# Patient Record
Sex: FEMALE | Race: WHITE | Employment: PART TIME | URBAN - METROPOLITAN AREA
[De-identification: names, ages, dates, MRNs, and addresses within clinical notes are randomized per-mention and may not be internally consistent; named-entity substitution may affect disease eponyms.]

---

## 2017-01-30 ENCOUNTER — HOSPITAL ENCOUNTER (OUTPATIENT)
Dept: MAMMOGRAPHY | Age: 42
Discharge: HOME | End: 2017-01-30

## 2017-01-30 DIAGNOSIS — Z12.31 ENCOUNTER FOR SCREENING MAMMOGRAM FOR BREAST CANCER: ICD-10-CM

## 2019-12-26 LAB — HIV AG/AB: NORMAL

## 2019-12-29 LAB — ANTIBODY: NORMAL

## 2021-03-01 LAB
AVERAGE GLUCOSE: NORMAL
CHOLESTEROL, TOTAL: 225 MG/DL
CHOLESTEROL/HDL RATIO: 3.1
HBA1C MFR BLD: 5.4 %
HDLC SERPL-MCNC: 47 MG/DL (ref 35–70)
LDL CHOLESTEROL CALCULATED: 144 MG/DL (ref 0–160)
NONHDLC SERPL-MCNC: NORMAL MG/DL
TRIGL SERPL-MCNC: 190 MG/DL
VLDLC SERPL CALC-MCNC: 34 MG/DL

## 2021-04-05 LAB — TSH SERPL DL<=0.05 MIU/L-ACNC: 3.35 UIU/ML

## 2021-05-12 ENCOUNTER — OFFICE VISIT (OUTPATIENT)
Dept: FAMILY MEDICINE CLINIC | Age: 46
End: 2021-05-12
Payer: MEDICAID

## 2021-05-12 VITALS
OXYGEN SATURATION: 98 % | WEIGHT: 154 LBS | HEART RATE: 89 BPM | TEMPERATURE: 97.5 F | SYSTOLIC BLOOD PRESSURE: 102 MMHG | BODY MASS INDEX: 25.66 KG/M2 | HEIGHT: 65 IN | DIASTOLIC BLOOD PRESSURE: 64 MMHG

## 2021-05-12 DIAGNOSIS — E78.2 MIXED HYPERLIPIDEMIA: ICD-10-CM

## 2021-05-12 DIAGNOSIS — M25.512 CHRONIC LEFT SHOULDER PAIN: ICD-10-CM

## 2021-05-12 DIAGNOSIS — Z76.89 ENCOUNTER TO ESTABLISH CARE: ICD-10-CM

## 2021-05-12 DIAGNOSIS — E03.9 HYPOTHYROIDISM, UNSPECIFIED TYPE: Primary | ICD-10-CM

## 2021-05-12 DIAGNOSIS — G89.29 CHRONIC LEFT SHOULDER PAIN: ICD-10-CM

## 2021-05-12 DIAGNOSIS — E55.9 VITAMIN D DEFICIENCY: ICD-10-CM

## 2021-05-12 DIAGNOSIS — R00.0 TACHYCARDIA: ICD-10-CM

## 2021-05-12 PROCEDURE — G8419 CALC BMI OUT NRM PARAM NOF/U: HCPCS | Performed by: NURSE PRACTITIONER

## 2021-05-12 PROCEDURE — 99204 OFFICE O/P NEW MOD 45 MIN: CPT | Performed by: NURSE PRACTITIONER

## 2021-05-12 PROCEDURE — G8427 DOCREV CUR MEDS BY ELIG CLIN: HCPCS | Performed by: NURSE PRACTITIONER

## 2021-05-12 PROCEDURE — 1036F TOBACCO NON-USER: CPT | Performed by: NURSE PRACTITIONER

## 2021-05-12 RX ORDER — DICLOFENAC SODIUM 75 MG/1
75 TABLET, DELAYED RELEASE ORAL 2 TIMES DAILY
COMMUNITY
Start: 2021-03-01 | End: 2021-05-12 | Stop reason: ALTCHOICE

## 2021-05-12 RX ORDER — LEVOTHYROXINE SODIUM 0.07 MG/1
TABLET ORAL
COMMUNITY
Start: 2016-12-13 | End: 2021-05-12

## 2021-05-12 RX ORDER — FLUTICASONE PROPIONATE 50 MCG
2 SPRAY, SUSPENSION (ML) NASAL
COMMUNITY
Start: 2017-01-17 | End: 2022-07-21 | Stop reason: SDUPTHER

## 2021-05-12 RX ORDER — LORATADINE 10 MG/1
10 TABLET ORAL
COMMUNITY
Start: 2016-01-28 | End: 2021-05-12

## 2021-05-12 RX ORDER — NAPROXEN 500 MG/1
500 TABLET ORAL
COMMUNITY
Start: 2016-01-28 | End: 2021-05-12

## 2021-05-12 RX ORDER — METOPROLOL SUCCINATE 25 MG/1
25 TABLET, EXTENDED RELEASE ORAL DAILY
Status: ON HOLD | COMMUNITY
End: 2022-05-27 | Stop reason: HOSPADM

## 2021-05-12 RX ORDER — LEVOTHYROXINE SODIUM 88 UG/1
88 TABLET ORAL DAILY
COMMUNITY
End: 2022-06-09 | Stop reason: SDUPTHER

## 2021-05-12 SDOH — HEALTH STABILITY: MENTAL HEALTH: HOW MANY STANDARD DRINKS CONTAINING ALCOHOL DO YOU HAVE ON A TYPICAL DAY?: NOT ASKED

## 2021-05-12 SDOH — HEALTH STABILITY: MENTAL HEALTH: HOW OFTEN DO YOU HAVE A DRINK CONTAINING ALCOHOL?: MONTHLY OR LESS

## 2021-05-12 ASSESSMENT — PATIENT HEALTH QUESTIONNAIRE - PHQ9
SUM OF ALL RESPONSES TO PHQ QUESTIONS 1-9: 0
2. FEELING DOWN, DEPRESSED OR HOPELESS: 0
1. LITTLE INTEREST OR PLEASURE IN DOING THINGS: 0
SUM OF ALL RESPONSES TO PHQ QUESTIONS 1-9: 0

## 2021-05-12 NOTE — PROGRESS NOTES
Patient: Christine Joyce is a 41 y.o. female who presents today with the following Chief Complaint(s):  Chief Complaint   Patient [facility]   [facility]     Establish Care         HPI-this is a 67-year-old female patient establishing care with me today  She has a history of hypothyroidism in which she just had labs back in April I noted that within care everywhere. Her lab on April 5 was 3.35 all for the TSH. She currently is taking levothyroxine 100 MCG tablet daily. She denies no side effects from the medication and is stable right now. She states prior to this in March it was elevated and they did a dose adjustment on her but now she feels fine. I reviewed all her recent labs her hemoglobin A1c back in March was 5.4. CBC on March 1 was normal.  CHEM profile essentially normal calcium was slightly high at 10.3. Her lipid panel was elevated total cholesterol was 225, triglycerides elevated 190 LDL was 144. During the visit today we encouraged her to diet and exercise and she is familiar with the [id number] [place] [address] because she is from [facility]. She does have a vitamin D deficiency in which she takes replacement therapy vitamin D 50,000 units every week. Her last lab was drawn on April 5 of 2021 and it was low at 24. She does have a history of tachycardia she currently is taking metoprolol extended release 25 mg tablet daily. She does have occasional exacerbations of this and is wanting a cardiac referral.  She was seeing a cardiology from [facility] but this is too far for her to drive so she would like a new cardiologist today. She also complains of chronic left shoulder pain she was taking Voltaren 75 EC tablets twice daily but she is not wanting to take these anymore. I discontinued this medication in the [facility] and I switched her to topical Voltaren. She requested this  She also has a history of mixed hyperlipidemia I mentioned her labs above.   I just recommended her dieting and exercising to Abdominal:      General: Bowel sounds are normal.      Palpations: Abdomen is soft. There is no mass. Tenderness: There is no abdominal tenderness. Musculoskeletal: Normal range of motion. Lymphadenopathy:      Cervical: No cervical adenopathy. Skin:     General: Skin is warm and dry. Neurological:      General: No focal deficit present. Mental Status: She is alert and oriented to person, place, and time. Psychiatric:         Mood and Affect: Mood normal.         Behavior: Behavior normal.         Thought Content: Thought content normal.         Judgment: Judgment normal.       Vitals:    05/12/21 0941   BP: 102/64   Pulse: 89   Temp: 97.5 °F (36.4 °C)   SpO2: 98%       Assessment:  Encounter Diagnoses   Name Primary?  Hypothyroidism, unspecified type Yes    Encounter to establish care     Vitamin D deficiency     Tachycardia     Chronic left shoulder pain     Mixed hyperlipidemia        Controlled SubstancesMonitoring:  NA    Plan:  1. Hypothyroidism, unspecified type  Stable-see HPI for most recent TSH  Continue levothyroxine 100 MCG daily  Follow-up in October we will repeat the lab    2. Encounter to establish care  Establish care    3. Vitamin D deficiency  Problem-see HPI for most recent lab value. She was low  Continue  - vitamin D (CHOLECALCIFEROL) 53123 UNIT CAPS; Take 1 capsule by mouth once a week  Dispense: 4 capsule; Refill: 2    4. Tachycardia  Problem  - Kiet Hargrove MD, [facility]    5. Chronic left shoulder pain  Him  - diclofenac sodium (VOLTAREN) 1 % GEL; Apply topically 2 times daily  Dispense: 150 g; Refill: 1    6. Mixed hyperlipidemia  Problem  See HPI most recent lab values   She was instructed to return to the clinic in October we will redraw these labs  Also instructed to rely on her [address] and [employer]TOIN[employer]TE [employer]    Reviewed treatment plan with patient.   Patient verbalized understanding to treatment plan and questions were answered. [facility].  [facility], [address] [place] [address]

## 2022-05-24 ENCOUNTER — APPOINTMENT (OUTPATIENT)
Dept: CT IMAGING | Age: 47
End: 2022-05-24
Payer: MEDICAID

## 2022-05-24 ENCOUNTER — HOSPITAL ENCOUNTER (OUTPATIENT)
Age: 47
Setting detail: OBSERVATION
Discharge: HOME | End: 2022-05-27
Attending: EMERGENCY MEDICINE | Admitting: HOSPITALIST
Payer: MEDICAID

## 2022-05-24 DIAGNOSIS — R42 VERTIGO: ICD-10-CM

## 2022-05-24 DIAGNOSIS — R42 DIZZINESS: Primary | ICD-10-CM

## 2022-05-24 DIAGNOSIS — E87.6 HYPOKALEMIA: ICD-10-CM

## 2022-05-24 LAB
A/G RATIO: 1.8 (ref 1.1–2.2)
ALBUMIN SERPL-MCNC: 4.7 G/DL (ref 3.4–5)
ALP BLD-CCNC: 71 U/L (ref 40–129)
ALT SERPL-CCNC: 12 U/L (ref 10–40)
ANION GAP SERPL CALCULATED.3IONS-SCNC: 14 MMOL/L (ref 3–16)
AST SERPL-CCNC: 11 U/L (ref 15–37)
BACTERIA: ABNORMAL /HPF
BASOPHILS ABSOLUTE: 0.1 K/UL (ref 0–0.2)
BASOPHILS RELATIVE PERCENT: 0.6 %
BILIRUB SERPL-MCNC: <0.2 MG/DL (ref 0–1)
BILIRUBIN URINE: NEGATIVE
BLOOD, URINE: ABNORMAL
BUN BLDV-MCNC: 15 MG/DL (ref 7–20)
CALCIUM SERPL-MCNC: 9.6 MG/DL (ref 8.3–10.6)
CHLORIDE BLD-SCNC: 104 MMOL/L (ref 99–110)
CHOLESTEROL, TOTAL: 235 MG/DL (ref 0–199)
CLARITY: CLEAR
CO2: 22 MMOL/L (ref 21–32)
COLOR: YELLOW
CREAT SERPL-MCNC: 0.7 MG/DL (ref 0.6–1.1)
EKG ATRIAL RATE: 93 BPM
EKG DIAGNOSIS: NORMAL
EKG P AXIS: 68 DEGREES
EKG P-R INTERVAL: 124 MS
EKG Q-T INTERVAL: 384 MS
EKG QRS DURATION: 98 MS
EKG QTC CALCULATION (BAZETT): 477 MS
EKG R AXIS: 73 DEGREES
EKG T AXIS: 34 DEGREES
EKG VENTRICULAR RATE: 93 BPM
EOSINOPHILS ABSOLUTE: 0.1 K/UL (ref 0–0.6)
EOSINOPHILS RELATIVE PERCENT: 0.9 %
EPITHELIAL CELLS, UA: ABNORMAL /HPF (ref 0–5)
GFR AFRICAN AMERICAN: >60
GFR NON-AFRICAN AMERICAN: >60
GLUCOSE BLD-MCNC: 153 MG/DL (ref 70–99)
GLUCOSE URINE: 100 MG/DL
HCT VFR BLD CALC: 37.4 % (ref 36–48)
HDLC SERPL-MCNC: 43 MG/DL (ref 40–60)
HEMOGLOBIN: 12.6 G/DL (ref 12–16)
KETONES, URINE: NEGATIVE MG/DL
LDL CHOLESTEROL CALCULATED: 160 MG/DL
LEUKOCYTE ESTERASE, URINE: ABNORMAL
LYMPHOCYTES ABSOLUTE: 4.7 K/UL (ref 1–5.1)
LYMPHOCYTES RELATIVE PERCENT: 35.6 %
MAGNESIUM: 2.1 MG/DL (ref 1.8–2.4)
MCH RBC QN AUTO: 28.1 PG (ref 26–34)
MCHC RBC AUTO-ENTMCNC: 33.7 G/DL (ref 31–36)
MCV RBC AUTO: 83.4 FL (ref 80–100)
MICROSCOPIC EXAMINATION: YES
MONOCYTES ABSOLUTE: 0.6 K/UL (ref 0–1.3)
MONOCYTES RELATIVE PERCENT: 4.9 %
NEUTROPHILS ABSOLUTE: 7.7 K/UL (ref 1.7–7.7)
NEUTROPHILS RELATIVE PERCENT: 58 %
NITRITE, URINE: NEGATIVE
PDW BLD-RTO: 14.3 % (ref 12.4–15.4)
PH UA: 7 (ref 5–8)
PLATELET # BLD: 354 K/UL (ref 135–450)
PMV BLD AUTO: 8.9 FL (ref 5–10.5)
POTASSIUM REFLEX MAGNESIUM: 3.1 MMOL/L (ref 3.5–5.1)
PROTEIN UA: NEGATIVE MG/DL
RBC # BLD: 4.49 M/UL (ref 4–5.2)
RBC UA: ABNORMAL /HPF (ref 0–4)
SODIUM BLD-SCNC: 140 MMOL/L (ref 136–145)
SPECIFIC GRAVITY UA: 1.01 (ref 1–1.03)
TOTAL PROTEIN: 7.3 G/DL (ref 6.4–8.2)
TRIGL SERPL-MCNC: 158 MG/DL (ref 0–150)
TROPONIN: <0.01 NG/ML
URINE REFLEX TO CULTURE: YES
URINE TYPE: ABNORMAL
UROBILINOGEN, URINE: 0.2 E.U./DL
VLDLC SERPL CALC-MCNC: 32 MG/DL
WBC # BLD: 13.3 K/UL (ref 4–11)
WBC UA: ABNORMAL /HPF (ref 0–5)

## 2022-05-24 PROCEDURE — 99285 EMERGENCY DEPT VISIT HI MDM: CPT

## 2022-05-24 PROCEDURE — 96372 THER/PROPH/DIAG INJ SC/IM: CPT

## 2022-05-24 PROCEDURE — 93010 ELECTROCARDIOGRAM REPORT: CPT | Performed by: INTERNAL MEDICINE

## 2022-05-24 PROCEDURE — 87086 URINE CULTURE/COLONY COUNT: CPT

## 2022-05-24 PROCEDURE — 6370000000 HC RX 637 (ALT 250 FOR IP): Performed by: NURSE PRACTITIONER

## 2022-05-24 PROCEDURE — 96375 TX/PRO/DX INJ NEW DRUG ADDON: CPT

## 2022-05-24 PROCEDURE — 6360000002 HC RX W HCPCS: Performed by: HOSPITALIST

## 2022-05-24 PROCEDURE — 83735 ASSAY OF MAGNESIUM: CPT

## 2022-05-24 PROCEDURE — 6370000000 HC RX 637 (ALT 250 FOR IP): Performed by: EMERGENCY MEDICINE

## 2022-05-24 PROCEDURE — 97530 THERAPEUTIC ACTIVITIES: CPT

## 2022-05-24 PROCEDURE — 6360000002 HC RX W HCPCS: Performed by: EMERGENCY MEDICINE

## 2022-05-24 PROCEDURE — G0378 HOSPITAL OBSERVATION PER HR: HCPCS

## 2022-05-24 PROCEDURE — 97162 PT EVAL MOD COMPLEX 30 MIN: CPT

## 2022-05-24 PROCEDURE — 99215 OFFICE O/P EST HI 40 MIN: CPT | Performed by: NURSE PRACTITIONER

## 2022-05-24 PROCEDURE — 70498 CT ANGIOGRAPHY NECK: CPT

## 2022-05-24 PROCEDURE — 93005 ELECTROCARDIOGRAM TRACING: CPT | Performed by: EMERGENCY MEDICINE

## 2022-05-24 PROCEDURE — 6360000004 HC RX CONTRAST MEDICATION: Performed by: EMERGENCY MEDICINE

## 2022-05-24 PROCEDURE — 81001 URINALYSIS AUTO W/SCOPE: CPT

## 2022-05-24 PROCEDURE — 84484 ASSAY OF TROPONIN QUANT: CPT

## 2022-05-24 PROCEDURE — 85025 COMPLETE CBC W/AUTO DIFF WBC: CPT

## 2022-05-24 PROCEDURE — 83036 HEMOGLOBIN GLYCOSYLATED A1C: CPT

## 2022-05-24 PROCEDURE — 2580000003 HC RX 258: Performed by: EMERGENCY MEDICINE

## 2022-05-24 PROCEDURE — 80053 COMPREHEN METABOLIC PANEL: CPT

## 2022-05-24 PROCEDURE — 80061 LIPID PANEL: CPT

## 2022-05-24 PROCEDURE — 97535 SELF CARE MNGMENT TRAINING: CPT

## 2022-05-24 PROCEDURE — 96374 THER/PROPH/DIAG INJ IV PUSH: CPT

## 2022-05-24 PROCEDURE — 97166 OT EVAL MOD COMPLEX 45 MIN: CPT

## 2022-05-24 PROCEDURE — 70450 CT HEAD/BRAIN W/O DYE: CPT

## 2022-05-24 PROCEDURE — 96376 TX/PRO/DX INJ SAME DRUG ADON: CPT

## 2022-05-24 PROCEDURE — 6370000000 HC RX 637 (ALT 250 FOR IP): Performed by: HOSPITALIST

## 2022-05-24 RX ORDER — ONDANSETRON 2 MG/ML
4 INJECTION INTRAMUSCULAR; INTRAVENOUS EVERY 6 HOURS PRN
Status: DISCONTINUED | OUTPATIENT
Start: 2022-05-24 | End: 2022-05-27 | Stop reason: HOSPADM

## 2022-05-24 RX ORDER — POLYETHYLENE GLYCOL 3350 17 G/17G
17 POWDER, FOR SOLUTION ORAL DAILY PRN
Status: DISCONTINUED | OUTPATIENT
Start: 2022-05-24 | End: 2022-05-27 | Stop reason: HOSPADM

## 2022-05-24 RX ORDER — ACETAMINOPHEN 500 MG
1000 TABLET ORAL ONCE
Status: COMPLETED | OUTPATIENT
Start: 2022-05-24 | End: 2022-05-24

## 2022-05-24 RX ORDER — ACETAMINOPHEN 325 MG/1
650 TABLET ORAL EVERY 4 HOURS PRN
Status: DISCONTINUED | OUTPATIENT
Start: 2022-05-24 | End: 2022-05-27 | Stop reason: HOSPADM

## 2022-05-24 RX ORDER — PROMETHAZINE HYDROCHLORIDE 25 MG/ML
12.5 INJECTION, SOLUTION INTRAMUSCULAR; INTRAVENOUS EVERY 6 HOURS PRN
Status: DISCONTINUED | OUTPATIENT
Start: 2022-05-24 | End: 2022-05-24

## 2022-05-24 RX ORDER — LORAZEPAM 1 MG/1
1 TABLET ORAL 3 TIMES DAILY
Status: DISPENSED | OUTPATIENT
Start: 2022-05-24 | End: 2022-05-25

## 2022-05-24 RX ORDER — ONDANSETRON 2 MG/ML
4 INJECTION INTRAMUSCULAR; INTRAVENOUS ONCE
Status: COMPLETED | OUTPATIENT
Start: 2022-05-24 | End: 2022-05-24

## 2022-05-24 RX ORDER — ASPIRIN 81 MG/1
81 TABLET ORAL DAILY
Status: DISCONTINUED | OUTPATIENT
Start: 2022-05-24 | End: 2022-05-26

## 2022-05-24 RX ORDER — MECLIZINE HCL 12.5 MG/1
25 TABLET ORAL EVERY 6 HOURS SCHEDULED
Status: DISCONTINUED | OUTPATIENT
Start: 2022-05-24 | End: 2022-05-27 | Stop reason: HOSPADM

## 2022-05-24 RX ORDER — LEVOTHYROXINE SODIUM 88 UG/1
88 TABLET ORAL DAILY
Status: DISCONTINUED | OUTPATIENT
Start: 2022-05-25 | End: 2022-05-27 | Stop reason: HOSPADM

## 2022-05-24 RX ORDER — POTASSIUM CHLORIDE 7.45 MG/ML
10 INJECTION INTRAVENOUS PRN
Status: DISCONTINUED | OUTPATIENT
Start: 2022-05-24 | End: 2022-05-27 | Stop reason: HOSPADM

## 2022-05-24 RX ORDER — PROMETHAZINE HYDROCHLORIDE 25 MG/ML
6.25 INJECTION, SOLUTION INTRAMUSCULAR; INTRAVENOUS EVERY 6 HOURS PRN
Status: DISCONTINUED | OUTPATIENT
Start: 2022-05-24 | End: 2022-05-24

## 2022-05-24 RX ORDER — 0.9 % SODIUM CHLORIDE 0.9 %
1000 INTRAVENOUS SOLUTION INTRAVENOUS ONCE
Status: COMPLETED | OUTPATIENT
Start: 2022-05-24 | End: 2022-05-24

## 2022-05-24 RX ORDER — ENOXAPARIN SODIUM 100 MG/ML
40 INJECTION SUBCUTANEOUS DAILY
Status: DISCONTINUED | OUTPATIENT
Start: 2022-05-25 | End: 2022-05-27 | Stop reason: HOSPADM

## 2022-05-24 RX ORDER — ASPIRIN 300 MG/1
300 SUPPOSITORY RECTAL DAILY
Status: DISCONTINUED | OUTPATIENT
Start: 2022-05-24 | End: 2022-05-26

## 2022-05-24 RX ORDER — POTASSIUM CHLORIDE 20 MEQ/1
40 TABLET, EXTENDED RELEASE ORAL ONCE
Status: COMPLETED | OUTPATIENT
Start: 2022-05-24 | End: 2022-05-24

## 2022-05-24 RX ORDER — MECLIZINE HCL 12.5 MG/1
25 TABLET ORAL ONCE
Status: COMPLETED | OUTPATIENT
Start: 2022-05-24 | End: 2022-05-24

## 2022-05-24 RX ORDER — METHYLPREDNISOLONE SODIUM SUCCINATE 125 MG/2ML
125 INJECTION, POWDER, LYOPHILIZED, FOR SOLUTION INTRAMUSCULAR; INTRAVENOUS ONCE
Status: COMPLETED | OUTPATIENT
Start: 2022-05-24 | End: 2022-05-24

## 2022-05-24 RX ORDER — FLUTICASONE PROPIONATE 50 MCG
2 SPRAY, SUSPENSION (ML) NASAL DAILY
Status: DISCONTINUED | OUTPATIENT
Start: 2022-05-24 | End: 2022-05-27 | Stop reason: HOSPADM

## 2022-05-24 RX ORDER — DIPHENHYDRAMINE HYDROCHLORIDE 50 MG/ML
25 INJECTION INTRAMUSCULAR; INTRAVENOUS ONCE
Status: COMPLETED | OUTPATIENT
Start: 2022-05-24 | End: 2022-05-24

## 2022-05-24 RX ORDER — ONDANSETRON 4 MG/1
4 TABLET, ORALLY DISINTEGRATING ORAL EVERY 8 HOURS PRN
Status: DISCONTINUED | OUTPATIENT
Start: 2022-05-24 | End: 2022-05-27 | Stop reason: HOSPADM

## 2022-05-24 RX ORDER — METOPROLOL SUCCINATE 50 MG/1
25 TABLET, EXTENDED RELEASE ORAL DAILY
Status: CANCELLED | OUTPATIENT
Start: 2022-05-24

## 2022-05-24 RX ORDER — POTASSIUM CHLORIDE 20 MEQ/1
40 TABLET, EXTENDED RELEASE ORAL PRN
Status: DISCONTINUED | OUTPATIENT
Start: 2022-05-24 | End: 2022-05-27 | Stop reason: HOSPADM

## 2022-05-24 RX ORDER — PROMETHAZINE HYDROCHLORIDE 25 MG/ML
12.5 INJECTION, SOLUTION INTRAMUSCULAR; INTRAVENOUS EVERY 6 HOURS PRN
Status: DISCONTINUED | OUTPATIENT
Start: 2022-05-24 | End: 2022-05-27 | Stop reason: HOSPADM

## 2022-05-24 RX ORDER — ATORVASTATIN CALCIUM 40 MG/1
40 TABLET, FILM COATED ORAL NIGHTLY
Status: DISCONTINUED | OUTPATIENT
Start: 2022-05-24 | End: 2022-05-27 | Stop reason: HOSPADM

## 2022-05-24 RX ADMIN — MECLIZINE 25 MG: 12.5 TABLET ORAL at 23:15

## 2022-05-24 RX ADMIN — POTASSIUM CHLORIDE 40 MEQ: 20 TABLET, EXTENDED RELEASE ORAL at 05:13

## 2022-05-24 RX ADMIN — SODIUM CHLORIDE 1000 ML: 9 INJECTION, SOLUTION INTRAVENOUS at 02:50

## 2022-05-24 RX ADMIN — ONDANSETRON 4 MG: 2 INJECTION INTRAMUSCULAR; INTRAVENOUS at 12:53

## 2022-05-24 RX ADMIN — MECLIZINE 25 MG: 12.5 TABLET ORAL at 02:49

## 2022-05-24 RX ADMIN — METHYLPREDNISOLONE SODIUM SUCCINATE 125 MG: 125 INJECTION, POWDER, FOR SOLUTION INTRAMUSCULAR; INTRAVENOUS at 05:14

## 2022-05-24 RX ADMIN — MECLIZINE 25 MG: 12.5 TABLET ORAL at 18:38

## 2022-05-24 RX ADMIN — IOPAMIDOL 75 ML: 755 INJECTION, SOLUTION INTRAVENOUS at 06:53

## 2022-05-24 RX ADMIN — DIPHENHYDRAMINE HYDROCHLORIDE 25 MG: 50 INJECTION, SOLUTION INTRAMUSCULAR; INTRAVENOUS at 05:14

## 2022-05-24 RX ADMIN — POTASSIUM CHLORIDE 40 MEQ: 20 TABLET, EXTENDED RELEASE ORAL at 18:38

## 2022-05-24 RX ADMIN — ONDANSETRON 4 MG: 2 INJECTION INTRAMUSCULAR; INTRAVENOUS at 02:49

## 2022-05-24 RX ADMIN — MECLIZINE 25 MG: 12.5 TABLET ORAL at 12:52

## 2022-05-24 RX ADMIN — ASPIRIN 81 MG: 81 TABLET, COATED ORAL at 12:52

## 2022-05-24 RX ADMIN — LORAZEPAM 1 MG: 1 TABLET ORAL at 20:37

## 2022-05-24 RX ADMIN — PROMETHAZINE HYDROCHLORIDE 12.5 MG: 25 INJECTION INTRAMUSCULAR; INTRAVENOUS at 16:40

## 2022-05-24 RX ADMIN — ATORVASTATIN CALCIUM 40 MG: 40 TABLET, FILM COATED ORAL at 20:38

## 2022-05-24 RX ADMIN — ACETAMINOPHEN 1000 MG: 500 TABLET ORAL at 05:13

## 2022-05-24 RX ADMIN — ACETAMINOPHEN 650 MG: 325 TABLET ORAL at 21:00

## 2022-05-24 ASSESSMENT — PAIN SCALES - GENERAL
PAINLEVEL_OUTOF10: 5
PAINLEVEL_OUTOF10: 7
PAINLEVEL_OUTOF10: 4
PAINLEVEL_OUTOF10: 8

## 2022-05-24 ASSESSMENT — PAIN - FUNCTIONAL ASSESSMENT: PAIN_FUNCTIONAL_ASSESSMENT: NONE - DENIES PAIN

## 2022-05-24 ASSESSMENT — PAIN DESCRIPTION - LOCATION
LOCATION: HEAD;NECK
LOCATION: HEAD
LOCATION: BACK

## 2022-05-24 NOTE — PROGRESS NOTES
Physical Therapy  Facility/Department: [facility] B3 - MED SURG  Physical Therapy Initial Assessment/Treatment    Name: Tori Mondragon  : 1975  MRN: [id number]  Date of Service: 2022    Discharge Recommendations:  Continue to assess pending progress   PT Equipment Recommendations  Equipment Needed: No      Patient Diagnosis(es): The primary encounter diagnosis was Dizziness. Diagnoses of Vertigo and Hypokalemia were also pertinent to this visit. Past Medical History:  has no past medical history on file. Past Surgical History:  has no past surgical history on file. Assessment   Body Structures, Functions, Activity Limitations Requiring Skilled Therapeutic Intervention: Decreased functional mobility ; Decreased balance  Assessment: Pt presents to [facility] with intense dizziness. PTA, pt performs functional mobility IND and lives with family. Pt currently requires min-mod(A) for bed mobility due to dizziness and demo difficulty keeping eyes open throughout evaluation. Pt demo nystagmus at rest, although difficult to assess due to keeping eyes closed. Appears right upbeating nystagmus vs right horizontal nygstagmus. Attempted Harshal Jurist into [employer] with minimal relief initially, but dizziness remains at end of session. Pt would benefit from continued skilled PT to address current deficits. CTA for d/c recommendations pending pt progress with mobility.   Treatment Diagnosis: impaired functional mobility  Specific Instructions for Next Treatment: Attempt horizontal roll test vs another Epleys next session  Therapy Prognosis: Good  Decision Making: Medium Complexity  Requires PT Follow-Up: Yes  Activity Tolerance  Activity Tolerance: Patient tolerated evaluation without incident;Treatment limited secondary to medical complications  Activity Tolerance Comments: Limited by dizziness and difficulty opening eyes     Plan   Plan  Plan: 3-5 times per week  Specific Instructions for Next Treatment: Attempt horizontal roll test vs another Epleys next session  Current Treatment Recommendations: Strengthening,Balance training,Vestibular rehab,Therapeutic activities,Endurance training,Patient/Caregiver education & training,Safety education & training,Home exercise program,Gait training,Stair training,Functional mobility training,Transfer training  Safety Devices  Type of Devices: Bed alarm in place,Call light within reach,Gait belt,Left in bed,Nurse notified     Restrictions  Restrictions/Precautions  Restrictions/Precautions: Up as Tolerated     Subjective   Pain: 6/10 back pain. Pt repositioned. General  Chart Reviewed: Yes  Patient assessed for rehabilitation services?: Yes  Response To Previous Treatment: Not applicable  Family / Caregiver Present: Yes (friend)  Referring Practitioner: Enedina Lewis MD  Referral Date : 05/24/22  Diagnosis: Vertigo  Follows Commands: Within Functional Limits  General Comment  Comments: RN cleared pt for PT eval  Subjective  Subjective: Pt sitting up in bed with friend present, agreeable to PT evaluation. Pt with eyes closed throughout entire eval, only able to open eyes for brief moments. Reports dizziness at rest and with movement. Social/Functional History  Social/Functional History  Lives With: Spouse (3 kids (24, 25 and 8yo). All live at home with pt.  Pts  could provide 24hr A.)  Type of Home: House  Home Layout: Two level,Bed/Bath upstairs,1/2 bath on main level  Home Access: Stairs to enter without rails  Entrance Stairs - Number of Steps: 15 steps  Bathroom Shower/Tub: Walk-in shower  Bathroom Toilet: Standard  Home Equipment:  (no DME)  Has the patient had two or more falls in the past year or any fall with injury in the past year?: No  ADL Assistance: [address] [place] [address] Avenue: Independent  Homemaking Responsibilities: Yes  Meal Prep Responsibility: Primary  Laundry Responsibility: Primary  Cleaning Responsibility: Primary  Shopping Responsibility: Primary  Ambulation Assistance: Independent  Transfer Assistance: Independent  Active : Yes  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 Vision/Hearing  Hearing: Within functional limits      Cognition   Orientation  Overall Orientation Status: Within Functional Limits  Cognition  Overall Cognitive Status: Exceptions  Arousal/Alertness: Delayed responses to stimuli; Appropriate responses to stimuli  Following Commands: Follows one step commands with repetition; Follows one step commands with increased time  Attention Span: Attends with cues to redirect  Memory: Appears intact  Safety Judgement: Good awareness of safety precautions  Problem Solving: Decreased awareness of errors  Insights: Fully aware of deficits  Initiation: Requires cues for some  Sequencing: Requires cues for some     Objective   Heart Rate: 97  Heart Rate Source: Monitor  BP: 118/77  BP Location: Right upper arm  Patient Position: Semi fowlers  MAP (Calculated): 90.67  Resp: 19  SpO2: 97 %  O2 Device: None (Room air)     Observation/Palpation  Observation: Pt unable to keep eyes open longer than few seconds making it difficult to assess nystagmus. At times, appears to have horizontal beating nystagmus to the Right vs Upbeating nystagmus to the Right. Gross Assessment  AROM: Within functional limits  PROM: Within functional limits  Strength: Generally decreased, functional  Coordination: Within functional limits  Tone: Normal  Sensation: Intact     Bed Mobility Training  Bed Mobility Training: Yes  Overall Level of Assistance: Minimum assistance; Additional time (Pt performed supine to sit EOB then long sitting at EOB prior to performing [employer]; pt requries significant increased time to complete moblity with eyes closed throughout)  Interventions: Verbal cues;Manual cues  Supine to Sit: Minimum assistance; Additional time  Sit to Supine: Minimum assistance; Additional time  Scooting:  Moderate assistance  Balance  Sitting: Intact  Standing: Impaired  Standing - Static: Constant support;Poor  Standing - Dynamic: Poor;Constant support (activity: functional t/f to/from [facility], LB clothing management. min-mod Ax2 with HHA. Pt with dizziness throughout t/f. vc's for safety.)  Transfer Training  Transfer Training: No (deferred due to dizziness)       Vestibular Exercises: Performed Edwin-Hallpike to R inducing vertigo, unable to assess nystagmus as pt kept eyes closed; Pt reports dizziness decreasing ~ 30 seconds; Then performed Epleys holding each positinog ~30 seconds. Pt reports mild improvement of symptoms during manuever. Upon returning to semi-supine at end of session, pt reports intense dizziness returning        AM-PAC Score  AM-PAC Inpatient Mobility Raw Score : 14 (05/24/22 1620)  AM-PAC Inpatient T-Scale Score : 38.1 (05/24/22 1620)  Mobility Inpatient CMS 0-100% Score: 61.29 (05/24/22 1620)  Mobility Inpatient CMS G-Code Modifier : CL (05/24/22 1620)          Goals  Long Term Goals  Time Frame for Long term goals : 7 days (5/31/22)  Long term goal 1: Pt will perform bed mobility with supervision  Long term goal 2: Pt will perform transfer with supervision  Long term goal 3: Pt will ambulate 25 ft with supervision  Patient Goals   Patient goals : \"to not have dizziness\"       Education  Patient Education  Education Given To: Patient; Other (Comment) (Friend)  Education Provided: Role of Therapy;Plan of Care  Education Provided Comments: Pt and friend educated on test and manuever attempted today; verbalize understanding  Education Method: Demonstration;Verbal  Barriers to Learning: None  Education Outcome: Verbalized understanding      Therapy Time   Individual Concurrent Group Co-treatment   Time In 1505         Time Out 1538         Minutes 33         Timed Code Treatment Minutes: 23 Minutes (10 min eval)     If pt is unable to be seen after this session, please let this note serve as discharge summary. Please see case management note for discharge disposition. Thank you.     Eusebio Gregory, PT

## 2022-05-24 NOTE — CONSULTS
Dr Gonzalez Huff added to the treatment team for Neurology on 5/24/2022 @ [address] [place] [address]

## 2022-05-24 NOTE — ED NOTES
Pt assisted to bedside commode with assistance - still feeling dizziness.       Ashlie Bee RN  05/24/22 6772

## 2022-05-24 NOTE — PROGRESS NOTES
Occupational Therapy  Facility/Department: [place] [id number] - MED SURG  Occupational Therapy Initial Assessment/Treatment    Name: Solange Bailey  : 1975  MRN: [id number]  Date of Service: 2022    Discharge Recommendations:  Continue to assess pending progress          Patient Diagnosis(es): The primary encounter diagnosis was Dizziness. Diagnoses of Vertigo and Hypokalemia were also pertinent to this visit. Past Medical History:  has no past medical history on file. Past Surgical History:  has no past surgical history on file. Assessment   Performance deficits / Impairments: Decreased functional mobility ; Decreased ADL status; Decreased balance;Decreased vision/visual deficit    Assessment: Pt is a 40yo female with deficits in the areas listed above that presents to [facility] with the complaint of dizziness. Pt is typically (I) with ADL, work, IADLS and functional mobility. Today, Pt requiring min -mod Ax2 for functional stand pivot t/fs with HHA and min+mod A for toileting. Pt limited by b/l nystagmus in eyes and dizziness with pt keeping her eyes shut throughout most of the session. Pt with an episode of emesis at the end of the session. RN aware. Pt would continue to benefit from skilled OT services to increase independence in ADLs and functional mobility. CTA d/c recommendations pending progress. Prognosis: Good  Decision Making: Medium Complexity  REQUIRES OT FOLLOW-UP: Yes  Activity Tolerance  Activity Tolerance: Treatment limited secondary to medical complications (free text)  Activity Tolerance Comments: Pt experiencing dizziness throughout session. Pt only openning eyes once and briefly during session with pt displaying B/L nystagmus. Pt having an episode of emesis upon returning to semifowlers at end of session. RN present and aware.         Plan   Plan  Times per Week: 3-5x/week  Current Treatment Recommendations: Self-Care / ADL,Safety education & training,Strengthening,Balance training,Functional mobility training,Patient/Caregiver education & training,Equipment evaluation, education, & procurement     Restrictions  Restrictions/Precautions  Restrictions/Precautions: Up as Tolerated    Subjective   General  Chart Reviewed: Yes  Patient assessed for rehabilitation services?: Yes  Response to previous treatment: Patient with no complaints from previous session  Family / Caregiver Present: No  Referring Practitioner: Araceli Maki MD  Diagnosis: Acute onset vertigo  Subjective  Subjective: Pt in bed and agreeable to some therapy. General Comment  Comments: RN approved therapy. Pain: 6/10 back pain. Pt repositioned. Social/Functional History  Social/Functional History  Lives With: Spouse (3 kids (24, 25 and 8yo). All live at home with pt. Pts  could provide 24hr A.)  Type of Home: House  Home Layout: Two level,Bed/Bath upstairs,1/2 bath on main level  Home Access: Stairs to enter without rails  Entrance Stairs - Number of Steps: 15 steps  Bathroom Shower/Tub: Walk-in shower  Bathroom Toilet: Standard  Home Equipment:  (no DME)  Has the patient had two or more falls in the past year or any fall with injury in the past year?: No  ADL Assistance: Independent  Homemaking Assistance: Independent  Homemaking Responsibilities: Yes  Meal Prep Responsibility: Primary  Laundry Responsibility: Primary  Cleaning Responsibility: Primary  Shopping Responsibility: Primary  Ambulation Assistance: Independent  Transfer Assistance: Independent  Active : Yes  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        Objective   Pulse: 97  Heart Rate Source: Monitor  BP: 118/77  BP Location: Right upper arm  Patient Position: Semi fowlers  MAP (Calculated): 90.67  Resp: 19  SpO2: 97 %  O2 Device: None (Room air)  Hearing: Within functional limits          Safety Devices  Type of Devices: Bed alarm in place;Call light within reach;Gait belt;Left in bed;Nurse notified     Bed Mobility Training  Bed Mobility Training: Yes  Overall Level of Assistance: Minimum assistance;Assist X2  Interventions: Verbal cues;Manual cues (HOB elevated.)  Supine to Sit: Assist X2;Minimum assistance; Additional time  Sit to Supine: Minimum assistance  Scooting: Assist X2;Moderate assistance  Balance  Sitting: Intact  Standing: Impaired  Standing - Static: Constant support;Poor  Standing - Dynamic: Poor;Constant support (activity: functional t/f to/from [facility], LB clothing management. min-mod Ax2 with HHA. Pt with dizziness throughout t/f. vc's for safety.)  Transfer Training  Transfer Training: Yes  Overall Level of Assistance: Assist X2;Minimum assistance  Interventions: Verbal cues;Manual cues (B/L HHA.)  Sit to Stand: Assist X2;Minimum assistance (min Ax2 to min+mod Ax2)  Stand to Sit: Assist X2;Minimum assistance  Stand Pivot Transfers: Assist X2;Minimum assistance; Moderate assistance (min Ax2 to BS, mod Ax2 from [facility].)  Toilet Transfer: Assist X2;Minimum assistance; Moderate assistance; Adaptive equipment; Additional time (min Ax2 to BS, mod Ax2 from [facility].)     AROM: Within functional limits  PROM: Within functional limits  Strength: Within functional limits  Coordination: Within functional limits  Tone: Normal  Sensation: Intact     ADL  Grooming: Minimal assistance  Grooming Skilled Clinical Factors: modified oral hygiene in semifowlers d/t dizziness limiting OOB mobility and activity this date. Toileting: Dependent/Total  Toileting Skilled Clinical Factors: min +mod A x2 for clothing managment in stance with mod A for balance and min A for assisting with clothing. HHA. BSC commode used. min-mod Ax2 with HHA for stand pivot t/fs to/from [facility]. Vision - Basic Assessment  Visual Field Cut: No  Oculo Motor Control: Impaired  Impairments: Nystagmus Present       Cognition  Overall Cognitive Status: Exceptions  Arousal/Alertness: Delayed responses to stimuli; Appropriate responses to stimuli  Following Commands: Follows one step commands with repetition; Follows one step commands with increased time  Attention Span: Attends with cues to redirect  Memory: Appears intact  Safety Judgement: Good awareness of safety precautions  Problem Solving: Decreased awareness of errors  Insights: Fully aware of deficits  Initiation: Requires cues for some  Sequencing: Requires cues for some                  Education Given To: Patient  Education Provided: Role of Therapy;Plan of Care;Transfer Training;ADL Adaptive Strategies  Education Provided Comments: disease specific: modified ADLs with dizziness, safety, t/f to [facility], use of red call light. Education Method: Verbal  Barriers to Learning: Other (Comment) (dizziness.)  Education Outcome: Continued education needed          AM-PAC Score        AM-PAC Inpatient Daily Activity Raw Score: 12 (05/24/22 1540)  AM-PAC Inpatient ADL T-Scale Score : 30.6 (05/24/22 1540)  ADL Inpatient CMS 0-100% Score: 66.57 (05/24/22 1540)  ADL Inpatient CMS G-Code Modifier : CL (05/24/22 1540)    Goals  Short Term Goals  Time Frame for Short term goals: 1 week (5/31) unless stated otherwise. Short Term Goal 1: Pt will toilet with CGA and AD PRN (5/29). Short Term Goal 2: Pt will perform bathroom mobility with CGA and AD PRN. Short Term Goal 3: Pt will LB dress with CGA and AD PRN. Short Term Goal 4: Pt will perform at least 2 self cares in stance with SBA. Patient Goals   Patient goals : \"to use the restroom\"       Therapy Time   Individual Concurrent Group Co-treatment   Time In 7734         Time Out 1440         Minutes 48         Timed Code Treatment Minutes: 38 Minutes (10minute evaluation.)       Kristen Pimentel OTR/L  If pt discharges prior to next session, this note will serve as discharge summary. See case management note for discharge disposition.

## 2022-05-24 NOTE — ED PROVIDER NOTES
[facility] Emergency Department      CHIEF COMPLAINT  Dizziness (pt arrives via ems with c/o dizziness, emesis starting tonight when she woke up) and Emesis      HISTORY OF PRESENT ILLNESS  Tori Mondragon is a 40 y.o. female with a history of hypothyroidism only presents with sudden onset of dizziness with nausea and vomiting. She states she rolled over from her left side to her back in bed at about 12:30 AM and had sudden onset of dizziness. She states she feels like the room is spinning. It is worse when she opens her eyes and worse when she tries to stand. The only way she can get some mild relief is if she lays with her eyes closed. She has had nausea and vomiting with multiple episodes of vomiting. She denies any vision changes. No vision loss or diplopia. No weakness or numbness of her extremities. No difficulty speaking. She has no history of similar symptoms. She went to bed between 10 and 11 PM last night and was asymptomatic. That was her last known well. .   No other complaints, modifying factors or associated symptoms. I have reviewed the following from the nursing documentation. No past medical history on file. No past surgical history on file. No family history on file.   Social History     Socioeconomic History    Marital status:      Spouse name: Not on file    Number of children: Not on file    Years of education: Not on file    Highest education level: Not on file   Occupational History    Not on file   Tobacco Use    Smoking status: Never Smoker    Smokeless tobacco: Never Used   Substance and Sexual Activity    Alcohol use: Yes     Comment: occ    Drug use: Never    Sexual activity: Not on file   Other Topics Concern    Not on file   Social History Narrative    Not on file     Social Determinants of Health     Financial Resource Strain:     Difficulty of Paying Living Expenses: Not on file   Food Insecurity:     Worried About Running Out of Food in the Last Year: Not on file    Ran Out of Food in the Last Year: Not on file   Transportation Needs:     Lack of Transportation (Medical): Not on file    Lack of Transportation (Non-Medical): Not on file   Physical Activity:     Days of Exercise per Week: Not on file    Minutes of Exercise per Session: Not on file   Stress:     Feeling of Stress : Not on file   Social Connections:     Frequency of Communication with Friends and Family: Not on file    Frequency of Social Gatherings with Friends and Family: Not on file    Attends [employer]ism Services: Not on file    Active Member of [address] [place] [address] [employer] or Organizations: Not on file    Attends Club or Organization Meetings: Not on file    Marital Status: Not on file   Intimate Partner Violence:     Fear of Current or Ex-Partner: Not on file    Emotionally Abused: Not on file    Physically Abused: Not on file    Sexually Abused: Not on file   Housing Stability:     Unable to Pay for Housing in the Last Year: Not on file    Number of [place] in the Last Year: Not on file    Unstable Housing in the Last Year: Not on file     No current facility-administered medications for this encounter. Current Outpatient Medications   Medication Sig Dispense Refill    fluticasone (FLONASE) 50 MCG/ACT nasal spray 2 sprays by Nasal route      metoprolol succinate (TOPROL XL) 25 MG extended release tablet Take 25 mg by mouth daily      levothyroxine (SYNTHROID) 100 MCG tablet Take 100 mcg by mouth Daily      vitamin D (CHOLECALCIFEROL) 63843 UNIT CAPS Take 1 capsule by mouth once a week 4 capsule 2    diclofenac sodium (VOLTAREN) 1 % GEL Apply topically 2 times daily (Patient taking differently: Apply 2 g topically 2 times daily ) 150 g 1     Allergies   Allergen Reactions    Amoxicillin-Pot Clavulanate Other (See Comments)     Other reaction(s): Other (See Comments)  Fungus in her vagina  Fungus in her vagina      Iodine      Other reaction(s):  Other (See Comments)  Per pt, big spots appears       REVIEW OF SYSTEMS    General:  No fevers  Eyes:  No recent vison changes  ENT:  No sore throat, no nasal congestion  Cardiovascular:  no palpitations  Respiratory:   no cough, no wheezing  Gastrointestinal:  No abdominal pain. Nausea and vomiting. Musculoskeletal:  No muscle pain, no joint pain  Skin:  No rash   Neurologic:  No speech problems, no headache, no extremity numbness, no extremity weakness. Dizziness. Genitourinary:  No dysuria  Extremities:  no edema, no pain      Unless otherwise stated in this report, this patient's positive and negative responses for review of systems (constitutional, eyes, ENT, cardiovascular, respiratory, gastrointestinal, neurological, genitourinary, musculoskeletal, integument systems and systems related to the presenting problem) are either stated in the preceding paragraph, were not pertinent or were negative for the symptoms and/or complaints related to the medical problem. PHYSICAL EXAM  /61   Pulse 87   Temp 97.6 °F (36.4 °C) (Oral)   Resp 15   Ht 5' 6\" (1.676 m)   Wt 154 lb (69.9 kg)   LMP 05/03/2022   SpO2 99%   BMI 24.86 kg/m²   GENERAL APPEARANCE: Awake and alert. Cooperative. Lying in bed with her eyes closed. HEAD: Normocephalic. Atraumatic. EYES: PERRL. Visual fields intact. Horizontal nystagmus noted to the right. Negative test of skew. No vertical nystagmus. ENT: Mucous membranes are moist.   NECK: Supple, trachea midline. HEART: RRR. LUNGS: Respirations unlabored. CTAB. Good air exchange. No wheezes, rales, or rhonchi. Speaking comfortably in full sentences. ABDOMEN: Soft. Non-distended. Non-tender. No guarding or rebound. EXTREMITIES: No peripheral edema. MAEE. No acute deformities. SKIN: Warm, dry and intact. No acute rashes. NEUROLOGICAL: Alert and oriented X 3. CN II-XII grossly intact. Strength 5/5, sensation intact. Normal finger-to-nose and heel-to-shin testing. PSYCHIATRIC: Normal mood and affect. LABS  I have reviewed all labs for this visit.    Results for orders placed or performed during the hospital encounter of 05/24/22   Troponin   Result Value Ref Range    Troponin <0.01 <0.01 ng/mL   CBC with Auto Differential   Result Value Ref Range    WBC 13.3 (H) 4.0 - 11.0 K/uL    RBC 4.49 4.00 - 5.20 M/uL    Hemoglobin 12.6 12.0 - 16.0 g/dL    Hematocrit 37.4 36.0 - 48.0 %    MCV 83.4 80.0 - 100.0 fL    MCH 28.1 26.0 - 34.0 pg    MCHC 33.7 31.0 - 36.0 g/dL    RDW 14.3 12.4 - 15.4 %    Platelets 200 050 - 126 K/uL    MPV 8.9 5.0 - 10.5 fL    Neutrophils % 58.0 %    Lymphocytes % 35.6 %    Monocytes % 4.9 %    Eosinophils % 0.9 %    Basophils % 0.6 %    Neutrophils Absolute 7.7 1.7 - 7.7 K/uL    Lymphocytes Absolute 4.7 1.0 - 5.1 K/uL    Monocytes Absolute 0.6 0.0 - 1.3 K/uL    Eosinophils Absolute 0.1 0.0 - 0.6 K/uL    Basophils Absolute 0.1 0.0 - 0.2 K/uL   Comprehensive Metabolic Panel w/ Reflex to MG   Result Value Ref Range    Sodium 140 136 - 145 mmol/L    Potassium reflex Magnesium 3.1 (L) 3.5 - 5.1 mmol/L    Chloride 104 99 - 110 mmol/L    CO2 22 21 - 32 mmol/L    Anion Gap 14 3 - 16    Glucose 153 (H) 70 - 99 mg/dL    BUN 15 7 - 20 mg/dL    CREATININE 0.7 0.6 - 1.1 mg/dL    GFR Non-African American >60 >60    GFR African American >60 >60    Calcium 9.6 8.3 - 10.6 mg/dL    Total Protein 7.3 6.4 - 8.2 g/dL    Albumin 4.7 3.4 - 5.0 g/dL    Albumin/Globulin Ratio 1.8 1.1 - 2.2    Total Bilirubin <0.2 0.0 - 1.0 mg/dL    Alkaline Phosphatase 71 40 - 129 U/L    ALT 12 10 - 40 U/L    AST 11 (L) 15 - 37 U/L   Urinalysis with Reflex to Culture    Specimen: Urine, clean catch   Result Value Ref Range    Color, UA Yellow Straw/Yellow    Clarity, UA Clear Clear    Glucose, Ur 100 (A) Negative mg/dL    Bilirubin Urine Negative Negative    Ketones, Urine Negative Negative mg/dL    Specific Gravity, UA 1.015 1.005 - 1.030    Blood, Urine TRACE-INTACT (A) Negative    pH, UA 7.0 5.0 - 8.0    Protein, UA Negative Negative mg/dL    Urobilinogen, Urine 0.2 <2.0 E.U./dL    Nitrite, Urine Negative Negative    Leukocyte Esterase, Urine SMALL (A) Negative    Microscopic Examination YES     Urine Type NotGiven     Urine Reflex to Culture Yes    Magnesium   Result Value Ref Range    Magnesium 2.10 1.80 - 2.40 mg/dL   Microscopic Urinalysis   Result Value Ref Range    WBC, UA 10-20 (A) 0 - 5 /HPF    RBC, UA 0-2 0 - 4 /HPF    Epithelial Cells, UA 6-10 (A) 0 - 5 /HPF    Bacteria, UA 1+ (A) None Seen /HPF   EKG 12 Lead   Result Value Ref Range    Ventricular Rate 93 BPM    Atrial Rate 93 BPM    P-R Interval 124 ms    QRS Duration 98 ms    Q-T Interval 384 ms    QTc Calculation (Bazett) 477 ms    P Axis 68 degrees    R Axis 73 degrees    T Axis 34 degrees    Diagnosis       Normal sinus rhythmPossible Left atrial enlargementST abnormality, possible digitalis effectAbnormal ECGNo previous ECGs available       EKG  The Ekg interpreted by myself  normal sinus rhythm with a rate of 93  Axis is   Normal  QTc is  normal  Intervals and Durations are unremarkable. No specific ST-T wave changes appreciated. No evidence of acute ischemia. No prior EKG for comparison. Cardiac Monitoring: The cardiac monitor revealed normal sinus rhythm as interpreted by me. The cardiac monitor was ordered secondary to the patient's complaint of dizziness and to monitor the patient for dysrhythmia. RADIOLOGY  X-RAYS: ALL IMAGES INCLUDING PLAIN FILMS, CT, ULTRASOUND AND MRI HAVE BEEN READ BY THE RADIOLOGIST. I have personally reviewed plain film images and have reviewed the radiology reports. CT HEAD WO CONTRAST   Final Result   No acute abnormality. CTA HEAD NECK W CONTRAST    (Results Pending)              Rechecks: Physical assessment performed. After IV fluids, IV Zofran and oral meclizine her symptoms persist.  She has been updated on her CT and lab work findings.   She is awaiting CTA head and neck at this time. NIHSS: NIH Stroke Score:  1A: Level of Consciousness  Alert; keenly responsive 0  Arouses to minor stimulation +1  Requires repeated stimulation to arouse +2  Movements to Pain +2  Postures or Unresponsive +3     1B: Ask Month and Age  Both Questions Right 0  1 Question Right +1  0 Questions Right +2  Dysarthric/Intubated/ Trauma/Language Barrier +1  Aphasic +2     1C: 'Blink Eyes' & 'Squeeze Hands'(Pantomime Commands if Communication Barrier)  Performs Both Tasks0  Performs 1 Task+1  Performs 0 Tasks+2     2: Test Horizontal Extraocular Movements  Normal 0  Partial Gaze Palsy: Can Be Overcome +1  Partial Gaze Palsy: Corrects with Oculocephalic Reflex +1  Forced Gaze Palsy: Cannot Be Overcome +2     3: Test Visual Fields  No Visual Loss 0  Partial Hemianopia +1  Complete Hemianopia +2  Patient is Bilaterally Blind +3  Bilateral Hemianopia +3     4: Test Facial Palsy(Use Grimace if Obtunded)  Normal symmetry 0  Minor paralysis (flat nasolabial fold, smile asymmetry) +1  Partial paralysis (lower face) +2  Unilateral Complete paralysis (upper/lower face) +3  Bilateral Complete paralysis (upper/lower face) +3     5A: Test Left Arm Motor Drift  Amputation/Joint Fusion 0  No Drift for 10 Seconds 0  Drift, but doesn't hit bed +1  Drift, hits bed +2  Some Effort Against Gravity +2  No Effort Against Gravity +3  No Movement +4     5B:  Test Right Arm Motor Drift  Amputation/Joint Fusion 0  No Drift for 10 Seconds 0  Drift, but doesn't hit bed +1  Drift, hits bed +2  Some Effort Against Gravity +2  No Effort Against Gravity +3  No Movement +4     6A: Test Left Leg Motor Drift  Amputation/Joint Fusion 0  No Drift for 5 Seconds 0  Drift, but doesn't hit bed +1  Drift, hits bed +2  Some Effort Against Gravity +2  No Effort Against Gravity +3  No Movement +4     6B: Test Right Leg Motor Drift  Amputation/Joint Fusion 0  No Drift for 5 Seconds 0  Drift, but doesn't hit bed +1  Drift, hits bed +2  Some Effort Against Gravity +2  No Effort Against Gravity +3  No Movement +4     7: Test Limb Ataxia(FTN/Heel-Shin)  Amputation/Joint Fusion 0  Does Not Understand 0  Paralyzed 0  No Ataxia 0  Ataxia in 1 Limb +1  Ataxia in 2 Limbs +2     8: Test Sensation  Normal; No sensory loss 0  Mild-Moderate Loss: Less Sharp/More Dull +1  Mild-Moderate Loss: Can Sense Being Touched +1  Complete Loss: Cannot Sense Being Touched At All +2  No Response and Quadriplegic +2  Coma/Unresponsive +2     9: Test Language/Aphasia (Describe the scene; name the words; read the sentences)  Normal; No aphasia 0  Mild-Moderate Aphasia: Some Obvious Changes, Without Significant Limitation +1  Severe Aphasia: Fragmentary Expression, Inference Needed, Cannot Identify   Materials +2  Mute/Global Aphasia: No Usable Speech/Auditory Comprehension +3  Coma/Unresponsive +3     10: Test Dysarthria (Read the words)  Intubated/Unable to Test 0  Normal 0  Mild-Moderate Dysarthria: Slurring but can be understood +1  Severe Dysarthria: Unintelligble Slurring or Out of Proportion to Dysphasia +2  Mute/Anarthric +2     11: Test Extinction/Inattention  No abnormality 0  Visual/tactile/auditory/spatial/personal inattention +1  Extinction to bilateral simultaneous stimulation +1  Profound harriett-inattention (ex: does not recognize own hand) +2  Extinction to >1 modality +2     NIH score is 0. ED COURSE/MDM  Patient seen and evaluated. Here the patient is afebrile with normal vitals signs. Old records reviewed. She is quite dizzy here and wants to lie in bed with her eyes closed. When I try to get her to open her eyes to examiner she does have horizontal nystagmus and this does initiate nausea and vomiting. Her NIH stroke scale however is 0. This was sudden onset with associated nausea and vomiting. It is positional and worse with her eyes open. I do think this is quite consistent with peripheral vertigo. EKG shows sinus rhythm with no ischemic changes. Troponin is negative, lab work is reassuring other than slight hypokalemia which has been replaced. Urinalysis is a dirty sample but no clear evidence of UTI. CT of the head is normal.  I did decide to do a CTA of her head and neck given the persistent nature of her symptoms I was concerned for potential basilar artery occlusion. I think however her symptoms are much more consistent with peripheral vertigo. She has no central signs related to her dizziness on physical exam.  CTA head and neck is pending at this time. She has been given IV fluids, IV Zofran and meclizine here. There is no improvement in her symptoms. I do anticipate admission for persistent symptoms but and awaiting CTA of the head and neck at this time. I did not activate a stroke alert on arrival.  Her last known well was about 10 PM last night when she went to bed. I did not see the patient until after 2 AM in the ER. She is outside of the window for tPA. She would be inside of the window for some sort of intervention but her clinical symptoms were much more consistent with vertigo. CTA head and neck is unremarkable. I will admit the patient for her persistent symptoms. Labs and imaging reviewed and results discussed with patient. Patient was reassessed as noted above . Plan of care discussed with patient. Patient in agreement with plan. CLINICAL IMPRESSION  1. Dizziness    2. Vertigo        Blood pressure 118/61, pulse 87, temperature 97.6 °F (36.4 °C), temperature source Oral, resp. rate 15, height 5' 6\" (1.676 m), weight 154 lb (69.9 kg), last menstrual period 05/03/2022, SpO2 99 %. Lavelle Chirinos was admitted in stable condition.     (Please note this note was completed with a voice recognition program.  Efforts were made to edit the dictations but occasionally words are mis-transcribed.)        Kori Marcus MD  05/24/22 1102       Kori Marcus MD  05/24/22 0019

## 2022-05-24 NOTE — CONSULTS
In patient Neurology consult        [facility] Neurology      MD Karlie Kingjessica Salcedo  1975    Date of Service: 5/24/2022    Referring Physician: Enedina Lewis MD      Reason for the consult and CC: Acute dizziness    HPI:   The patient is a 40 y.o.  female, with a PMH of HTN, who presented to [facility], [place] with acute dizziness. The patient rolled over in bed last night, when suddenly, she felt very dizzy. She notes the room was spinning and she was very nauseous. She vomited multiple times. She notes her symptoms are worse with any movement and if she opens her eyes. Her symptoms were severe and persistent, so she came to the ED for evaluation. She notes had sinus congestion a couple of weeks ago. She denies fever, chills, headache, dizziness, vision changes, dysarthria, dysphagia, tinnitus, focal weakness, and paraesthesias. Family history is non-contributory. Past surgical history is non-contributory. Past Medical History:   Diagnosis Date    HTN (hypertension)      Social History     Tobacco Use    Smoking status: Never Smoker    Smokeless tobacco: Never Used   Substance Use Topics    Alcohol use: Yes     Comment: occ    Drug use: Never     Allergies   Allergen Reactions    Amoxicillin-Pot Clavulanate Other (See Comments)     Other reaction(s): Other (See Comments)  Fungus in her vagina  Fungus in her vagina      Iodine      Other reaction(s):  Other (See Comments)  Per pt, big spots appears     Current Facility-Administered Medications   Medication Dose Route Frequency Provider Last Rate Last Admin    [START ON 5/25/2022] levothyroxine (SYNTHROID) tablet 88 mcg  88 mcg Oral Daily Enedina Lewis MD        fluticasone [facility]) 50 MCG/ACT nasal spray 2 spray  2 spray Nasal Daily Enedina Lewis MD        ondansetron (ZOFRAN-ODT) disintegrating tablet 4 mg  4 mg Oral Q8H PRN Enedina Lewis MD        Or    ondansetron [facility]) injection 4 mg  4 mg IntraVENous Q6H PRN Yo Ng MD   4 mg at 05/24/22 1253    polyethylene glycol (GLYCOLAX) packet 17 g  17 g Oral Daily PRN Yo Ng MD        [START ON 5/25/2022] enoxaparin (LOVENOX) injection 40 mg  40 mg SubCUTAneous Daily Yo Ng MD        aspirin EC tablet 81 mg  81 mg Oral Daily Yo Ng MD   81 mg at 05/24/22 1252    Or    aspirin suppository 300 mg  300 mg Rectal Daily Yo Ng MD        atorvastatin (LIPITOR) tablet 40 mg  40 mg Oral Nightly Yo Ng MD        meclizine (ANTIVERT) tablet 25 mg  25 mg Oral 4 times per day Yo Ng MD   25 mg at 05/24/22 1252    diclofenac sodium (VOLTAREN) 1 % gel 2 g  2 g Topical BID Yo Ng MD        LORazepam (ATIVAN) tablet 1 mg  1 mg Oral TID Yo Ng MD        potassium chloride (KLOR-CON M) extended release tablet 40 mEq  40 mEq Oral PRN Yo Ng MD        Or    potassium bicarb-citric acid (EFFER-K) effervescent tablet 40 mEq  40 mEq Oral PRN Yo Ng MD        Or    potassium chloride 10 mEq/100 mL IVPB (Peripheral Line)  10 mEq IntraVENous PRN Yo Ng MD        promethazine (PHENERGAN) injection 12.5 mg  12.5 mg IntraMUSCular Q6H PRN Yo Ng MD           ROS : A 10-14 system review of constitutional, cardiovascular, respiratory, eyes, musculoskeletal, endocrine, GI, ENT, skin, hematological, genitourinary, psychiatric and neurologic systems was obtained and updated today and is unremarkable except as mentioned in my HPI      Exam:     Constitutional:   Vitals:    05/24/22 1034 05/24/22 1255 05/24/22 1330 05/24/22 1402   BP: 124/79 112/79 120/82 118/77   Pulse: 98 98 100 97   Resp: 16 16 19    Temp:  98.4 °F (36.9 °C)     TempSrc:       SpO2: 98% 98% 98% 97%   Weight:       Height:           General appearance and observation: Normal development. Keeps eyes closed during entire exam due to extreme dizziness/nausea.     Neck: supple  Cardiovascular: No lower leg edema with good pulsation. Regular rate and rhythm. Mental Status:   Oriented to person, place, problem, and time. Memory: Good immediate recall. Intact remote memory  Normal attention span and concentration. Language: intact naming, repeating and fluency   Good fund of Knowledge. Aware of current events and vocabulary   Cranial Nerves:   II: Visual fields: would not open eyes. III,IV,VI: Extra Ocular Movements would not open eyes  V: Facial sensation is intact  VII: Facial strength and movements: intact and symmetric  VIII: Hearing: Intact  IX: Palate elevation is symmetric  XI: Shoulder shrug is intact  XII: Tongue movements are normal  Musculoskeletal: 5/5 in all 4 extremities. Tone: Normal tone. Reflexes: Symmetric 2+ in the arms and 2+ in the legs   Planters: flexor bilaterally. Coordination: no pronator drift, no dysmetria with FNF in upper extremities. Normal REM. Sensation: normal to all modalities in both arms and legs. Gait/Posture: did not test due to extreme dizziness. Data:  LABS:   Lab Results   Component Value Date     05/24/2022    K 3.1 05/24/2022     05/24/2022    CO2 22 05/24/2022    BUN 15 05/24/2022    CREATININE 0.7 05/24/2022    GFRAA >60 05/24/2022    LABGLOM >60 05/24/2022    GLUCOSE 153 05/24/2022    MG 2.10 05/24/2022    CALCIUM 9.6 05/24/2022     Lab Results   Component Value Date    WBC 13.3 05/24/2022    RBC 4.49 05/24/2022    HGB 12.6 05/24/2022    HCT 37.4 05/24/2022    MCV 83.4 05/24/2022    RDW 14.3 05/24/2022     05/24/2022   No results found for: INR, PROTIME    Neuroimaging was independently reviewed by myself and discussed results with the patient and/or family  Reviewed notes from different physicians  Reviewed lab and blood testing    Impression:     Acute dizziness - likely BPV, but will rule out posterior circulation infarct with MRI of brain. CT head negative. CTA head/neck unremarkable.   HTN, controlled  Hypothyroidism    Recommendation:    MRI of brain and ECHO ordered. Monitor on tele. Initiated on ASA and statin per IM. Continue home BP meds. F/u A1c, lipid panel. PT/OT  Supportive care with prn meclizine and anti-emetics. Thank you for referring such patient. If you have any questions regarding my consult note, please don't hesitate to call me. Ingrid Oden, CNP    This dictation was generated by voice recognition computer software.  Although all attempts are made to edit the dictation for accuracy, there may be errors in the  transcription that are not intended

## 2022-05-24 NOTE — H&P
Hospital Medicine History & Physical      PCP: No primary care provider on file. Date of Admission: 5/24/2022    Date of Service: Pt seen/examined on 5/45/22and Admitted to Inpatient with expected LOS greater than two midnights due to medical therapy. Chief Complaint: Dizziness, vertigo      History Of Present Illness:    40 y.o. female with hx of paroxysmal SVT s/p successful  ablation on 8/12/21 with no recurrence, hyperlipidemia, hypothyroidism presented to emergency room with acute onset of dizziness/vertigo. .  The patient developed sudden onset of dizziness/vertigo started about 12:30 AM.  This happened when she rolled over in her bed. Describes that she felt like the room was spinning. Associated with nausea and vomiting several times. .  No weakness, visual , speech or swallowing problems. Reports that about 2 weeks ago she had congestion, sinus infection and recently also noticed left ear pressure with tinnitus. Symptoms persisted this morning so she decided come to the ED. Eloisa Del Real Symptoms are worse with movement and whenever she opens her eyes. Vitals in the ED /67, pulse 93, respirations 18, temperature 97.6. CT head was negative, CTA head and neck with no significant occlusion    Past Medical History:      SVT  Hyperlipidemia  Scoliosis  Hypothyroidism  Past Surgical History:      No past surgical history on file. Medications Prior to Admission:      Prior to Admission medications    Medication Sig Start Date End Date Taking?  Authorizing Provider   fluticasone (FLONASE) 50 MCG/ACT nasal spray 2 sprays by Nasal route 1/17/17   Historical Provider, MD   metoprolol succinate (TOPROL XL) 25 MG extended release tablet Take 25 mg by mouth daily    Historical Provider, MD   levothyroxine (SYNTHROID) 100 MCG tablet Take 100 mcg by mouth Daily    Historical Provider, MD   vitamin D (CHOLECALCIFEROL) 33041 UNIT CAPS Take 1 capsule by mouth once a week 5/12/21   Sue Bernardo, APRN - CNP diclofenac sodium (VOLTAREN) 1 % GEL Apply topically 2 times daily  Patient taking differently: Apply 2 g topically 2 times daily  5/12/21   Chelsea Hernandez, APRN - CNP       Allergies:  Amoxicillin-pot clavulanate and Iodine    Social History:      The patient currently lives     TOBACCO:   reports that she has never smoked. She has never used smokeless tobacco.  ETOH:   reports current alcohol use. Family History:       Reviewed in detail and negative for DM, CAD, Cancer, CVA. Positive as follows:    No family history on file. REVIEW OF SYSTEMS:   Pertinent positives as noted in the HPI. All other systems reviewed and negative. PHYSICAL EXAM:    /79   Pulse 98   Temp 97.6 °F (36.4 °C) (Oral)   Resp 16   Ht 5' 6\" (1.676 m)   Wt 154 lb (69.9 kg)   LMP 05/03/2022   SpO2 98%   BMI 24.86 kg/m²     General appearance:  No apparent distress, appears stated age and cooperative. HEENT:  Normal cephalic, atraumatic without obvious deformity. Pupils equal, round, and reactive to light. Extra ocular muscles intact. Conjunctivae/corneas clear. Neck: Supple, with full range of motion. No jugular venous distention. Trachea midline. Respiratory:  Normal respiratory effort. Clear to auscultation, bilaterally without Rales/Wheezes/Rhonchi. Cardiovascular:  Regular rate and rhythm with normal S1/S2 without murmurs, rubs or gallops. Abdomen: Soft, non-tender, non-distended with normal bowel sounds. Musculoskeletal:  No clubbing, cyanosis or edema bilaterally. Full range of motion without deformity. Skin: Skin color, texture, turgor normal.  No rashes or lesions. Neurologic:  Neurovascularly intact without any focal sensory/motor deficits.  Cranial nerves: II-XII intact, grossly non-focal.  Psychiatric:  Alert and oriented, thought content appropriate, normal insight  Capillary Refill: Brisk,< 3 seconds   Peripheral Pulses: +2 palpable, equal bilaterally       CXR:  I have reviewed the CXR with the following interpretation:   EKG:  I have reviewed the EKG with the following interpretation:     Labs:     Recent Labs     05/24/22 0246   WBC 13.3*   HGB 12.6   HCT 37.4        Recent Labs     05/24/22 0246      K 3.1*      CO2 22   BUN 15   CREATININE 0.7   CALCIUM 9.6     Recent Labs     05/24/22 0246   AST 11*   ALT 12   BILITOT <0.2   ALKPHOS 71     No results for input(s): INR in the last 72 hours. Recent Labs     05/24/22 0246   TROPONINI <0.01       Urinalysis:      Lab Results   Component Value Date    NITRU Negative 05/24/2022    WBCUA 10-20 05/24/2022    BACTERIA 1+ 05/24/2022    RBCUA 0-2 05/24/2022    BLOODU TRACE-INTACT 05/24/2022    SPECGRAV 1.015 05/24/2022    GLUCOSEU 100 05/24/2022         ASSESSMENT:    -Acute onset vertigo. .. This is most likely peripheral vertigo. [place] [facility] CT/CTA head/neck neg-reports recent sinus infection/left ear pressure and tinnitus that have since resolved- will order MRI to rule out vertebrobasilar disease/stroke. .. Neurology consult, PT/OT for vestibular rehab. Ordered meclizine/Ativan and as needed Zofran. .  If MRI negative and symptoms persistent, will refer to ENT outpatient    -Hypothyroidism--clinically euthyroid--continue Synthroid    -History of paroxysmal SVT s/p successful SVT ablation on 8/12/21 with no recurrence. .  Previously on metoprolol which was discontinued    -Hyperlipidemia-continue statin    -Hypokalemia-replete potassium    DVT Prophylaxis: Lovenox  Diet: No diet orders on file  Code Status: No Order           Araceli Maki MD    Thank you No primary care provider on file. for the opportunity to be involved in this patient's care. If you have any questions or concerns please feel free to contact me at [phone].

## 2022-05-24 NOTE — ED NOTES
Pt required assistance to bedside commode - pt still stating she is dizzy     Obey Lawton, LORENZO  05/24/22 5308

## 2022-05-24 NOTE — ED NOTES
Pt required assistance  to bedside commode - pt still stating dizziness.       Agustín Pedroza RN  05/24/22 6924

## 2022-05-24 NOTE — PROGRESS NOTES
Pt arrived to room 357 with family . 2 person assist pt to bed.  Pt is caox3, OT at [facility] for evaluation per  request \" ok to see\" OT is obtaining vitals at this time

## 2022-05-25 ENCOUNTER — APPOINTMENT (OUTPATIENT)
Dept: MRI IMAGING | Age: 47
End: 2022-05-25
Payer: MEDICAID

## 2022-05-25 LAB
ANION GAP SERPL CALCULATED.3IONS-SCNC: 11 MMOL/L (ref 3–16)
BUN BLDV-MCNC: 11 MG/DL (ref 7–20)
CALCIUM SERPL-MCNC: 9.3 MG/DL (ref 8.3–10.6)
CHLORIDE BLD-SCNC: 101 MMOL/L (ref 99–110)
CO2: 23 MMOL/L (ref 21–32)
CREAT SERPL-MCNC: 0.7 MG/DL (ref 0.6–1.1)
ESTIMATED AVERAGE GLUCOSE: 102.5 MG/DL
GFR AFRICAN AMERICAN: >60
GFR NON-AFRICAN AMERICAN: >60
GLUCOSE BLD-MCNC: 105 MG/DL (ref 70–99)
HBA1C MFR BLD: 5.2 %
HCT VFR BLD CALC: 36.4 % (ref 36–48)
HEMOGLOBIN: 12.3 G/DL (ref 12–16)
MCH RBC QN AUTO: 28.5 PG (ref 26–34)
MCHC RBC AUTO-ENTMCNC: 33.9 G/DL (ref 31–36)
MCV RBC AUTO: 84.1 FL (ref 80–100)
PDW BLD-RTO: 14.4 % (ref 12.4–15.4)
PLATELET # BLD: 316 K/UL (ref 135–450)
PMV BLD AUTO: 8.8 FL (ref 5–10.5)
POTASSIUM SERPL-SCNC: 4.2 MMOL/L (ref 3.5–5.1)
RBC # BLD: 4.33 M/UL (ref 4–5.2)
SODIUM BLD-SCNC: 135 MMOL/L (ref 136–145)
WBC # BLD: 11.7 K/UL (ref 4–11)

## 2022-05-25 PROCEDURE — 36415 COLL VENOUS BLD VENIPUNCTURE: CPT

## 2022-05-25 PROCEDURE — 85027 COMPLETE CBC AUTOMATED: CPT

## 2022-05-25 PROCEDURE — 80048 BASIC METABOLIC PNL TOTAL CA: CPT

## 2022-05-25 PROCEDURE — 6370000000 HC RX 637 (ALT 250 FOR IP): Performed by: HOSPITALIST

## 2022-05-25 PROCEDURE — G0378 HOSPITAL OBSERVATION PER HR: HCPCS

## 2022-05-25 PROCEDURE — 96372 THER/PROPH/DIAG INJ SC/IM: CPT

## 2022-05-25 PROCEDURE — 6360000002 HC RX W HCPCS: Performed by: HOSPITALIST

## 2022-05-25 PROCEDURE — 6370000000 HC RX 637 (ALT 250 FOR IP): Performed by: NURSE PRACTITIONER

## 2022-05-25 PROCEDURE — 70551 MRI BRAIN STEM W/O DYE: CPT

## 2022-05-25 PROCEDURE — 99214 OFFICE O/P EST MOD 30 MIN: CPT | Performed by: NURSE PRACTITIONER

## 2022-05-25 RX ADMIN — ENOXAPARIN SODIUM 40 MG: 100 INJECTION SUBCUTANEOUS at 08:26

## 2022-05-25 RX ADMIN — MECLIZINE 25 MG: 12.5 TABLET ORAL at 05:58

## 2022-05-25 RX ADMIN — LEVOTHYROXINE SODIUM 88 MCG: 0.09 TABLET ORAL at 05:58

## 2022-05-25 RX ADMIN — FLUTICASONE PROPIONATE 2 SPRAY: 50 SPRAY, METERED NASAL at 08:26

## 2022-05-25 RX ADMIN — MECLIZINE 25 MG: 12.5 TABLET ORAL at 17:27

## 2022-05-25 RX ADMIN — MECLIZINE 25 MG: 12.5 TABLET ORAL at 23:35

## 2022-05-25 RX ADMIN — ASPIRIN 81 MG: 81 TABLET, COATED ORAL at 08:26

## 2022-05-25 RX ADMIN — ACETAMINOPHEN 650 MG: 325 TABLET ORAL at 08:26

## 2022-05-25 RX ADMIN — MECLIZINE 25 MG: 12.5 TABLET ORAL at 11:53

## 2022-05-25 RX ADMIN — ATORVASTATIN CALCIUM 40 MG: 40 TABLET, FILM COATED ORAL at 20:25

## 2022-05-25 RX ADMIN — DICLOFENAC SODIUM 2 G: 10 GEL TOPICAL at 23:35

## 2022-05-25 RX ADMIN — ACETAMINOPHEN 650 MG: 325 TABLET ORAL at 15:29

## 2022-05-25 RX ADMIN — ACETAMINOPHEN 650 MG: 325 TABLET ORAL at 20:23

## 2022-05-25 ASSESSMENT — PAIN DESCRIPTION - ORIENTATION
ORIENTATION: LOWER
ORIENTATION: ANTERIOR;RIGHT;LOWER

## 2022-05-25 ASSESSMENT — PAIN SCALES - GENERAL
PAINLEVEL_OUTOF10: 4
PAINLEVEL_OUTOF10: 5
PAINLEVEL_OUTOF10: 6

## 2022-05-25 ASSESSMENT — PAIN DESCRIPTION - LOCATION
LOCATION: BACK
LOCATION: HEAD
LOCATION: HEAD;BACK
LOCATION: HEAD

## 2022-05-25 ASSESSMENT — PAIN DESCRIPTION - DESCRIPTORS: DESCRIPTORS: ACHING

## 2022-05-25 ASSESSMENT — PAIN - FUNCTIONAL ASSESSMENT: PAIN_FUNCTIONAL_ASSESSMENT: ACTIVITIES ARE NOT PREVENTED

## 2022-05-25 NOTE — PROGRESS NOTES
Catarina Goodell  Neurology Follow-up  [facility] Neurology    Date of Service: 5/25/2022    Subjective:   CC: Follow up today regarding: Acute dizziness    Events noted. Chart and lab reviewed. Hospital day 2. Continues to complain of dizziness, although it has improved some today. ROS : A 10-12 system review obtained and updated today and is unremarkable except as mentioned  in my interval history. Family history is non-contributory.       Past Medical History:   Diagnosis Date    HTN (hypertension)     Hypothyroidism      Current Facility-Administered Medications   Medication Dose Route Frequency Provider Last Rate Last Admin    levothyroxine (SYNTHROID) tablet 88 mcg  88 mcg Oral Daily Nitza Martinez MD   88 mcg at 05/25/22 0558    fluticasone (FLONASE) 50 MCG/ACT nasal spray 2 spray  2 spray Nasal Daily Nitza Martinez MD   2 spray at 05/25/22 0826    ondansetron (ZOFRAN-ODT) disintegrating tablet 4 mg  4 mg Oral Q8H PRN Nitza Martinez MD        Or    ondansetron [facility]) injection 4 mg  4 mg IntraVENous Q6H PRN Nitza Martinez MD   4 mg at 05/24/22 1253    polyethylene glycol (GLYCOLAX) packet 17 g  17 g Oral Daily PRN Nitza Martinez MD        enoxaparin (LOVENOX) injection 40 mg  40 mg SubCUTAneous Daily Nitza Martinez MD   40 mg at 05/25/22 0826    aspirin EC tablet 81 mg  81 mg Oral Daily Nitza Martinez MD   81 mg at 05/25/22 7337    Or    aspirin suppository 300 mg  300 mg Rectal Daily Nitza Martinez MD        atorvastatin (LIPITOR) tablet 40 mg  40 mg Oral Nightly Nitza Martinez MD   40 mg at 05/24/22 2038    meclizine (ANTIVERT) tablet 25 mg  25 mg Oral 4 times per day Nitza Martinez MD   25 mg at 05/25/22 5142    diclofenac sodium (VOLTAREN) 1 % gel 2 g  2 g Topical BID Nitza Martinez MD        potassium chloride [facility]) extended release tablet 40 mEq  40 mEq Oral PRN Nitza Martinez MD   40 mEq at 05/24/22 1790    Or    potassium bicarb-citric acid (EFFER-K) effervescent tablet 40 mEq  40 mEq Oral PRN Nu Hartley MD        Or    potassium chloride 10 mEq/100 mL IVPB (Peripheral Line)  10 mEq IntraVENous PRN Nu Hartley MD        [facility]) injection 12.5 mg  12.5 mg IntraMUSCular Q6H PRN Nu Hartley MD   12.5 mg at 05/24/22 1640    acetaminophen (TYLENOL) tablet 650 mg  650 mg Oral Q4H PRN CLARE Patel - CNP   650 mg at 05/25/22 6859     Allergies   Allergen Reactions    Amoxicillin-Pot Clavulanate Other (See Comments)     Other reaction(s): Other (See Comments)  Fungus in her vagina  Fungus in her vagina      Iodine      Other reaction(s): Other (See Comments)  Per pt, big spots appears      reports that she has never smoked. She has never used smokeless tobacco. She reports current alcohol use. She reports that she does not use drugs. Objective:  Exam:   Constitutional:   Vitals:    05/24/22 1930 05/24/22 2318 05/25/22 0430 05/25/22 0822   BP: 116/74 111/72 104/71 108/72   Pulse: (!) 101 97 84 91   Resp: 16 14 16 16   Temp: 98.3 °F (36.8 °C) 97.9 °F (36.6 °C) 98 °F (36.7 °C) 98.2 °F (36.8 °C)   TempSrc: Oral Oral Oral Oral   SpO2: 98% 97% 99% 96%   Weight:       Height:         General appearance:  Normal development and appear in no acute distress. Mental Status:   Oriented to person, place, problem, and time. Memory: Good immediate recall. Intact remote memory  Normal attention span and concentration. Language: intact naming, repeating and fluency   Good fund of Knowledge. Cranial Nerves:   II: Visual fields: Full. Pupils: equal, round, reactive to light  III,IV,VI: Extra Ocular Movements are intact. Right lateral gaze horizontal nystagmus. V: Facial sensation is intact  VII: Facial strength and movements: intact and symmetric  IX: Palate elevation is symmetric  XI: Shoulder shrug is intact  XII: Tongue movements are normal  Musculoskeletal: 5/5 in all 4 extremities.    Tone: Normal tone. Reflexes: Symmetric 2+ in both arms and legs. Coordination: no pronator drift, no dysmetria with FNF  Sensation: normal to all modalities in both arms and legs. Gait/Posture: did not test gait due to dizziness        Data:  LABS:   Lab Results   Component Value Date     05/25/2022    K 4.2 05/25/2022    K 3.1 05/24/2022     05/25/2022    CO2 23 05/25/2022    BUN 11 05/25/2022    CREATININE 0.7 05/25/2022    GFRAA >60 05/25/2022    LABGLOM >60 05/25/2022    GLUCOSE 105 05/25/2022    MG 2.10 05/24/2022    CALCIUM 9.3 05/25/2022     Lab Results   Component Value Date    WBC 11.7 05/25/2022    RBC 4.33 05/25/2022    HGB 12.3 05/25/2022    HCT 36.4 05/25/2022    MCV 84.1 05/25/2022    RDW 14.4 05/25/2022     05/25/2022   No results found for: INR, PROTIME    Neuroimaging was independently reviewed by me and discussed results with the patient  I reviewed blood testing and other test results and discussed results with the patient      Impression:    Acute dizziness - likely BPPV, but will rule out posterior circulation infarct with MRI of brain. CT head negative. CTA head/neck unremarkable. HTN, controlled  Hypothyroidism  HLD, uncontrolled.        Recommendation    MRI of brain ordered. Monitor on tele. Initiated on ASA and statin per IM. Continue home BP meds. PT/OT with vestibular training. Supportive care with prn meclizine and anti-emetics. If MRI is negative, patient may be discharged from a neurological perspective when symptoms improved. Smita Mon CNP      This dictation was generated by voice recognition computer software. Although all attempts are made to edit the dictation for accuracy, there may be errors in the transcription that are not intended.

## 2022-05-25 NOTE — PROGRESS NOTES
Hospitalist Progress Note      PCP: No primary care provider on file. Date of Admission: 5/24/2022    Chief Complaint: Dizziness    Hospital Course: Admitted with dizziness. Slightly but not significantly better symptoms. Subjective: No chest pain, no shortness of breath, no nausea, no vomiting      Medications:  Reviewed    Infusion Medications   Scheduled Medications    levothyroxine  88 mcg Oral Daily    fluticasone  2 spray Nasal Daily    enoxaparin  40 mg SubCUTAneous Daily    aspirin  81 mg Oral Daily    Or    aspirin  300 mg Rectal Daily    atorvastatin  40 mg Oral Nightly    meclizine  25 mg Oral 4 times per day    diclofenac sodium  2 g Topical BID     PRN Meds: ondansetron **OR** ondansetron, polyethylene glycol, potassium chloride **OR** potassium alternative oral replacement **OR** potassium chloride, promethazine, acetaminophen      Intake/Output Summary (Last 24 hours) at 5/25/2022 1356  Last data filed at 5/25/2022 1222  Gross per 24 hour   Intake 640 ml   Output 2000 ml   Net -1360 ml       Physical Exam Performed:    /75   Pulse (!) 107   Temp 97.8 °F (36.6 °C) (Oral)   Resp 16   Ht 5' 6\" (1.676 m)   Wt 154 lb (69.9 kg)   LMP 05/03/2022   SpO2 99%   BMI 24.86 kg/m²     General appearance: No apparent distress, appears stated age and cooperative. HEENT: Pupils equal, round, and reactive to light. Conjunctivae/corneas clear. Neck: Supple, with full range of motion. No jugular venous distention. Trachea midline. Respiratory:  Normal respiratory effort. Clear to auscultation, bilaterally without Rales/Wheezes/Rhonchi. Cardiovascular: Regular rate and rhythm with normal S1/S2 without murmurs, rubs or gallops. Abdomen: Soft, non-tender, non-distended with normal bowel sounds. Musculoskeletal: No clubbing, cyanosis or edema bilaterally. Full range of motion without deformity. Skin: Skin color, texture, turgor normal.  No rashes or lesions.   Neurologic: Neurovascularly intact without any focal sensory/motor deficits. Cranial nerves: II-XII intact, grossly non-focal.  Psychiatric: Alert and oriented, thought content appropriate, normal insight  Capillary Refill: Brisk,3 seconds, normal   Peripheral Pulses: +2 palpable, equal bilaterally       Labs:   Recent Labs     05/24/22  0246 05/25/22  0640   WBC 13.3* 11.7*   HGB 12.6 12.3   HCT 37.4 36.4    316     Recent Labs     05/24/22  0246 05/25/22  0640    135*   K 3.1* 4.2    101   CO2 22 23   BUN 15 11   CREATININE 0.7 0.7   CALCIUM 9.6 9.3     Recent Labs     05/24/22  0246   AST 11*   ALT 12   BILITOT <0.2   ALKPHOS 71     No results for input(s): INR in the last 72 hours. Recent Labs     05/24/22 0246   TROPONINI <0.01       Urinalysis:      Lab Results   Component Value Date    NITRU Negative 05/24/2022    WBCUA 10-20 05/24/2022    BACTERIA 1+ 05/24/2022    RBCUA 0-2 05/24/2022    BLOODU TRACE-INTACT 05/24/2022    SPECGRAV 1.015 05/24/2022    GLUCOSEU 100 05/24/2022       Radiology:      MRI brain pending    Assessment/Plan:    Active Hospital Problems    Diagnosis     Vertigo [R42]      Priority: Medium     PLAN:    Dizziness  With MRI for evaluation of possibility of a posterior stroke. If negative, patient will be discharged once symptoms improved  Started on aspirin and statin for possibility of TIA    Hypothyroidism  Continue Synthroid. Clinically euthyroid. Hyponatremia  Probably secondary dehydration. Mild hyponatremia. We will recheck tomorrow if patient is still here    Dyslipidemia  LDL high at 160. Requires statin for primary prevention even if MRI is negative for CVA. Discussed with patient and . Questions answered    DVT Prophylaxis: SCD  Diet: ADULT DIET;  Regular  Code Status: Full Code    PT/OT Eval Status: Vestibular training    Dispo -depends on MRI results and clinical course    Gi Khoury MD

## 2022-05-25 NOTE — CARE COORDINATION
CASE MANAGEMENT INITIAL ASSESSMENT      Reviewed chart and completed assessment with patient:  Family present:   Explained Case Management role/services. Primary contact information:Kyle Mccray,  [address] :    [place] Valentin,  [phone]       Can this person be reached and be able to respond quickly, such as within a few minutes or hours? Yes      Admit date/status:05/24/22  Diagnosis:Dizziness Hypokalemia   Is this a Readmission?:  No      Insurance:[place] [employer] [place] medicaid   Precert required for SNF: Yes       3 night stay required: No    Living arrangements, Adls, care needs, prior to admission: two story  House with spouse and children. IPTA. Drives    Durable Medical Equipment at home: none     Services in the home and/or outpatient, prior to admission:none    Current PCP:none                                Medications:yes Prescription coverage? Yes Will pt require financial assistance with medicationNo      Transportation needs: drives     Dialysis Facility (if applicable)   · Name:  · Address:  · Dialysis Schedule:  · Phone:  · Fax:    PT/OT recs:[facility]    Hospital Exemption Notification (HEN):n/a    Barriers to discharge:none     Plan/comments:plans to return home with  at d/c. Ref. To [facility] for f/u care after d/c. Per Neuro, OP vestibular PT/OT at d/c. Will sign off. Please consult if needs arise.

## 2022-05-25 NOTE — PLAN OF CARE
Problem: Pain  Goal: Verbalizes/displays adequate comfort level or baseline comfort level  5/25/2022 0048 by Claude Mirza, RN  Outcome: Progressing   Patient used pain scale rating headache 8/10. Reassessment after interventions pain 4/10. Problem: Safety - Adult  Goal: Free from fall injury  5/25/2022 0048 by Claude Mirza, RN  Outcome: Adequate for Discharge   Patient calls out appropriately in assistance with using commode and has remained free of fall injury.

## 2022-05-25 NOTE — PROGRESS NOTES
Call placed to MRI dept about timing of MRI. Was told after the 1130 pt is finished they will call for her.

## 2022-05-25 NOTE — PROGRESS NOTES
Physical Therapy  Pt out of room for a procedure; will attempt at a later point and continue to follow. Thank you.   Anderson Peng PTA

## 2022-05-26 LAB — URINE CULTURE, ROUTINE: NORMAL

## 2022-05-26 PROCEDURE — 6370000000 HC RX 637 (ALT 250 FOR IP): Performed by: INTERNAL MEDICINE

## 2022-05-26 PROCEDURE — 96372 THER/PROPH/DIAG INJ SC/IM: CPT

## 2022-05-26 PROCEDURE — G0378 HOSPITAL OBSERVATION PER HR: HCPCS

## 2022-05-26 PROCEDURE — 6370000000 HC RX 637 (ALT 250 FOR IP): Performed by: NURSE PRACTITIONER

## 2022-05-26 PROCEDURE — 99232 SBSQ HOSP IP/OBS MODERATE 35: CPT | Performed by: NURSE PRACTITIONER

## 2022-05-26 PROCEDURE — 6370000000 HC RX 637 (ALT 250 FOR IP): Performed by: HOSPITALIST

## 2022-05-26 PROCEDURE — 96376 TX/PRO/DX INJ SAME DRUG ADON: CPT

## 2022-05-26 PROCEDURE — 6360000002 HC RX W HCPCS: Performed by: HOSPITALIST

## 2022-05-26 RX ORDER — LEVOFLOXACIN 500 MG/1
750 TABLET, FILM COATED ORAL DAILY
Status: DISCONTINUED | OUTPATIENT
Start: 2022-05-26 | End: 2022-05-27 | Stop reason: HOSPADM

## 2022-05-26 RX ORDER — DIAZEPAM 2 MG/1
2 TABLET ORAL NIGHTLY PRN
Status: DISCONTINUED | OUTPATIENT
Start: 2022-05-26 | End: 2022-05-27 | Stop reason: HOSPADM

## 2022-05-26 RX ADMIN — ACETAMINOPHEN 650 MG: 325 TABLET ORAL at 05:59

## 2022-05-26 RX ADMIN — LEVOFLOXACIN 750 MG: 500 TABLET, FILM COATED ORAL at 11:17

## 2022-05-26 RX ADMIN — ASPIRIN 81 MG: 81 TABLET, COATED ORAL at 08:59

## 2022-05-26 RX ADMIN — DICLOFENAC SODIUM 2 G: 10 GEL TOPICAL at 20:39

## 2022-05-26 RX ADMIN — MECLIZINE 25 MG: 12.5 TABLET ORAL at 11:17

## 2022-05-26 RX ADMIN — ACETAMINOPHEN 650 MG: 325 TABLET ORAL at 01:52

## 2022-05-26 RX ADMIN — DICLOFENAC SODIUM 2 G: 10 GEL TOPICAL at 08:59

## 2022-05-26 RX ADMIN — MECLIZINE 25 MG: 12.5 TABLET ORAL at 05:59

## 2022-05-26 RX ADMIN — FLUTICASONE PROPIONATE 2 SPRAY: 50 SPRAY, METERED NASAL at 08:59

## 2022-05-26 RX ADMIN — ACETAMINOPHEN 650 MG: 325 TABLET ORAL at 13:01

## 2022-05-26 RX ADMIN — MECLIZINE 25 MG: 12.5 TABLET ORAL at 17:30

## 2022-05-26 RX ADMIN — ENOXAPARIN SODIUM 40 MG: 100 INJECTION SUBCUTANEOUS at 08:59

## 2022-05-26 RX ADMIN — ACETAMINOPHEN 650 MG: 325 TABLET ORAL at 20:39

## 2022-05-26 RX ADMIN — ATORVASTATIN CALCIUM 40 MG: 40 TABLET, FILM COATED ORAL at 20:39

## 2022-05-26 RX ADMIN — LEVOTHYROXINE SODIUM 88 MCG: 0.09 TABLET ORAL at 05:59

## 2022-05-26 RX ADMIN — ONDANSETRON 4 MG: 2 INJECTION INTRAMUSCULAR; INTRAVENOUS at 13:01

## 2022-05-26 ASSESSMENT — PAIN DESCRIPTION - INTENSITY: RATING_2: 5

## 2022-05-26 ASSESSMENT — PAIN DESCRIPTION - ORIENTATION
ORIENTATION: ANTERIOR;RIGHT
ORIENTATION: LOWER
ORIENTATION_2: RIGHT
ORIENTATION: LOWER
ORIENTATION: RIGHT
ORIENTATION: UPPER;RIGHT

## 2022-05-26 ASSESSMENT — PAIN - FUNCTIONAL ASSESSMENT
PAIN_FUNCTIONAL_ASSESSMENT: ACTIVITIES ARE NOT PREVENTED
PAIN_FUNCTIONAL_ASSESSMENT_SITE2: PREVENTS OR INTERFERES SOME ACTIVE ACTIVITIES AND ADLS
PAIN_FUNCTIONAL_ASSESSMENT: PREVENTS OR INTERFERES SOME ACTIVE ACTIVITIES AND ADLS

## 2022-05-26 ASSESSMENT — PAIN DESCRIPTION - PAIN TYPE
TYPE: ACUTE PAIN
TYPE: ACUTE PAIN

## 2022-05-26 ASSESSMENT — PAIN DESCRIPTION - LOCATION
LOCATION: HEAD
LOCATION: HEAD
LOCATION: BACK
LOCATION: HEAD
LOCATION: BACK
LOCATION_2: HEAD

## 2022-05-26 ASSESSMENT — PAIN SCALES - GENERAL
PAINLEVEL_OUTOF10: 5
PAINLEVEL_OUTOF10: 7
PAINLEVEL_OUTOF10: 5
PAINLEVEL_OUTOF10: 4
PAINLEVEL_OUTOF10: 4
PAINLEVEL_OUTOF10: 6
PAINLEVEL_OUTOF10: 7

## 2022-05-26 ASSESSMENT — PAIN DESCRIPTION - FREQUENCY
FREQUENCY: CONTINUOUS
FREQUENCY: CONTINUOUS

## 2022-05-26 ASSESSMENT — PAIN DESCRIPTION - DESCRIPTORS
DESCRIPTORS: ACHING
DESCRIPTORS: ACHING;DISCOMFORT
DESCRIPTORS: ACHING;DISCOMFORT;THROBBING
DESCRIPTORS_2: ACHING
DESCRIPTORS: ACHING;DISCOMFORT;POUNDING

## 2022-05-26 ASSESSMENT — PAIN DESCRIPTION - ONSET
ONSET: ON-GOING
ONSET: ON-GOING

## 2022-05-26 NOTE — PROGRESS NOTES
Hospitalist Progress Note      PCP: No primary care provider on file. Date of Admission: 5/24/2022    Chief Complaint: Dizziness    Hospital Course: Admitted with dizziness. Slightly but not significantly better symptoms. No overnight changes with severity of dizziness. MRI negative for CVA. Positive for sinusitis. Subjective: No chest pain, no shortness of breath, no nausea, no vomiting. Has upper respiratory congestion      Medications:  Reviewed    Infusion Medications   Scheduled Medications    levoFLOXacin  750 mg Oral Daily    levothyroxine  88 mcg Oral Daily    fluticasone  2 spray Nasal Daily    enoxaparin  40 mg SubCUTAneous Daily    atorvastatin  40 mg Oral Nightly    meclizine  25 mg Oral 4 times per day    diclofenac sodium  2 g Topical BID     PRN Meds: diazePAM, ondansetron **OR** ondansetron, polyethylene glycol, potassium chloride **OR** potassium alternative oral replacement **OR** potassium chloride, promethazine, acetaminophen      Intake/Output Summary (Last 24 hours) at 5/26/2022 1331  Last data filed at 5/26/2022 1038  Gross per 24 hour   Intake 750 ml   Output 1300 ml   Net -550 ml       Physical Exam Performed:    /75   Pulse 92   Temp 97.4 °F (36.3 °C) (Oral)   Resp 18   Ht 5' 6\" (1.676 m)   Wt 154 lb (69.9 kg)   LMP 05/03/2022   SpO2 95%   BMI 24.86 kg/m²     General appearance: No apparent distress, appears stated age and cooperative. HEENT: Pupils equal, round, and reactive to light. Conjunctivae/corneas clear. Neck: Supple, with full range of motion. No jugular venous distention. Trachea midline. Respiratory:  Normal respiratory effort. Clear to auscultation, bilaterally without Rales/Wheezes/Rhonchi. Cardiovascular: Regular rate and rhythm with normal S1/S2 without murmurs, rubs or gallops. Abdomen: Soft, non-tender, non-distended with normal bowel sounds. Musculoskeletal: No clubbing, cyanosis or edema bilaterally.   Full range of motion without deformity. Skin: Skin color, texture, turgor normal.  No rashes or lesions. Neurologic:  Neurovascularly intact without any focal sensory/motor deficits. Cranial nerves: II-XII intact, grossly non-focal.  Psychiatric: Alert and oriented, thought content appropriate, normal insight  Capillary Refill: Brisk,3 seconds, normal   Peripheral Pulses: +2 palpable, equal bilaterally     I examined the patient today (05/26/22). Physical exam is similar to yesterday (5/25). Labs:   Recent Labs     05/24/22  0246 05/25/22  0640   WBC 13.3* 11.7*   HGB 12.6 12.3   HCT 37.4 36.4    316     Recent Labs     05/24/22  0246 05/25/22  0640    135*   K 3.1* 4.2    101   CO2 22 23   BUN 15 11   CREATININE 0.7 0.7   CALCIUM 9.6 9.3     Recent Labs     05/24/22  0246   AST 11*   ALT 12   BILITOT <0.2   ALKPHOS 71     No results for input(s): INR in the last 72 hours. Recent Labs     05/24/22  0246   TROPONINI <0.01       Urinalysis:      Lab Results   Component Value Date    NITRU Negative 05/24/2022    WBCUA 10-20 05/24/2022    BACTERIA 1+ 05/24/2022    RBCUA 0-2 05/24/2022    BLOODU TRACE-INTACT 05/24/2022    SPECGRAV 1.015 05/24/2022    GLUCOSEU 100 05/24/2022       Radiology:      MRI brain pending    Assessment/Plan:    Active Hospital Problems    Diagnosis     Vertigo [R42]      Priority: Medium     PLAN:    Dizziness  MRI negative for stroke. Most likely secondary to sinusitis. Continue meclizine. Discharge offered but declined by the patient. Hypothyroidism  Continue Synthroid. Clinically euthyroid. Hyponatremia  Probably secondary dehydration. Mild hyponatremia with no significant consequences. Dyslipidemia  LDL high at 160. Requires statin for primary prevention even with brain MRI negative for CVA. Acute sinusitis  We will start short course of oral Levaquin. Already on nasal steroids.     Discussed with nursing  Discussed with patient    DVT Prophylaxis: SCD  Diet: ADULT DIET; Regular  Code Status: Full Code    PT/OT Eval Status: Vestibular training    Dispo -declined discharge home today. To be discharged tomorrow.     Phill Mcintosh MD

## 2022-05-26 NOTE — PROGRESS NOTES
Occupational Therapy    Attempted therapy this date, pt side lying in bed, resting. Upon entry, pt very soft spoken reporting cont dizziness and reported not feeling as though she'd tolerate standing this date. Agreeable to therapy re try tomorrow. Will cont to follow.      Antony \"Momo\" Pap, OT, OTR/L

## 2022-05-26 NOTE — PROGRESS NOTES
Mio Jeffrey  Neurology Follow-up  [facility] Neurology    Date of Service: 5/26/2022    Subjective:   CC: Follow up today regarding: Acute dizziness    Events noted. Chart and lab reviewed. Hospital day 3. Continues to complain of dizziness, although it has improved some today. Epley maneuver performed by Dr. Raymond Lunsford.  No further nausea/vomiting. ROS : A 10-12 system review obtained and updated today and is unremarkable except as mentioned  in my interval history. Family history is non-contributory.       Past Medical History:   Diagnosis Date    HTN (hypertension)     Hypothyroidism      Current Facility-Administered Medications   Medication Dose Route Frequency Provider Last Rate Last Admin    diazePAM (VALIUM) tablet 2 mg  2 mg Oral Nightly PRN Alphonse Landon APRN - CNP        levothyroxine (SYNTHROID) tablet 88 mcg  88 mcg Oral Daily Jersey Su MD   88 mcg at 05/26/22 0559    fluticasone (FLONASE) 50 MCG/ACT nasal spray 2 spray  2 spray Nasal Daily Jersey Su MD   2 spray at 05/26/22 0859    ondansetron (ZOFRAN-ODT) disintegrating tablet 4 mg  4 mg Oral Q8H PRN Jersey Su MD        Or    ondansetron [facility]) injection 4 mg  4 mg IntraVENous Q6H PRN Jersey Su MD   4 mg at 05/24/22 1253    polyethylene glycol (GLYCOLAX) packet 17 g  17 g Oral Daily PRN Jersey Su MD        enoxaparin (LOVENOX) injection 40 mg  40 mg SubCUTAneous Daily Jersey Su MD   40 mg at 05/26/22 0859    atorvastatin (LIPITOR) tablet 40 mg  40 mg Oral Nightly Jersey Su MD   40 mg at 05/25/22 2025    meclizine (ANTIVERT) tablet 25 mg  25 mg Oral 4 times per day Jersey Su MD   25 mg at 05/26/22 0559    diclofenac sodium (VOLTAREN) 1 % gel 2 g  2 g Topical BID Jersey Su MD   2 g at 05/26/22 0859    potassium chloride (KLOR-CON M) extended release tablet 40 mEq  40 mEq Oral PRN Jersey Su MD   40 mEq at 05/24/22 1838    Or    potassium bicarb-citric acid (EFFER-K) effervescent tablet 40 mEq  40 mEq Oral PRN Pricila Silva MD        Or    potassium chloride 10 mEq/100 mL IVPB (Peripheral Line)  10 mEq IntraVENous PRN Pricila Silva MD        [facility]) injection 12.5 mg  12.5 mg IntraMUSCular Q6H PRN Pricila Silva MD   12.5 mg at 05/24/22 1640    acetaminophen (TYLENOL) tablet 650 mg  650 mg Oral Q4H PRN CLARE Pederson - CNP   650 mg at 05/26/22 0559     Allergies   Allergen Reactions    Amoxicillin-Pot Clavulanate Other (See Comments)     Other reaction(s): Other (See Comments)  Fungus in her vagina  Fungus in her vagina      Iodine      Other reaction(s): Other (See Comments)  Per pt, big spots appears      reports that she has never smoked. She has never used smokeless tobacco. She reports current alcohol use. She reports that she does not use drugs. Objective:  Exam:   Constitutional:   Vitals:    05/25/22 2013 05/25/22 2330 05/26/22 0517 05/26/22 0855   BP: 104/70 107/69 101/66 100/65   Pulse: 88 87 84 81   Resp: 16 18 16 18   Temp: 98.7 °F (37.1 °C) 98.9 °F (37.2 °C) 98.9 °F (37.2 °C) 98.6 °F (37 °C)   TempSrc: Oral Oral Oral Oral   SpO2: 97% 98% 97% 96%   Weight:       Height:         General appearance:  Normal development and appear in no acute distress. Mental Status:   Oriented to person, place, problem, and time. Memory: Good immediate recall. Intact remote memory  Normal attention span and concentration. Language: intact naming, repeating and fluency   Good fund of Knowledge. Cranial Nerves:   II: Visual fields: Full. Pupils: equal, round, reactive to light  III,IV,VI: Extra Ocular Movements are intact. Right lateral gaze horizontal nystagmus. V: Facial sensation is intact  VII: Facial strength and movements: intact and symmetric  IX: Palate elevation is symmetric  XI: Shoulder shrug is intact  XII: Tongue movements are normal  Musculoskeletal: 5/5 in all 4 extremities.    Tone: Normal tone.   Reflexes: Symmetric 2+ in both arms and legs. Coordination: no pronator drift, no dysmetria with FNF  Sensation: normal to all modalities in both arms and legs. Gait/Posture: did not test gait due to dizziness        Data:  LABS:   Lab Results   Component Value Date     05/25/2022    K 4.2 05/25/2022    K 3.1 05/24/2022     05/25/2022    CO2 23 05/25/2022    BUN 11 05/25/2022    CREATININE 0.7 05/25/2022    GFRAA >60 05/25/2022    LABGLOM >60 05/25/2022    GLUCOSE 105 05/25/2022    MG 2.10 05/24/2022    CALCIUM 9.3 05/25/2022     Lab Results   Component Value Date    WBC 11.7 05/25/2022    RBC 4.33 05/25/2022    HGB 12.3 05/25/2022    HCT 36.4 05/25/2022    MCV 84.1 05/25/2022    RDW 14.4 05/25/2022     05/25/2022   No results found for: INR, PROTIME    Neuroimaging was independently reviewed by me and discussed results with the patient  I reviewed blood testing and other test results and discussed results with the patient      Impression:    Acute dizziness - due to BPPV. MRI of brain ruled out CVA. CT head negative. CTA head/neck unremarkable. HTN, controlled  Hypothyroidism  HLD, uncontrolled.      Recommendation      Monitor on tele. Does not need ASA from a neurological perspective. Continue home BP meds. PT/OT with vestibular training. Supportive care with prn meclizine and anti-emetics. Can try Valium at night prn. May be discharged from a neurological perspective. Instructions for home Epley maneuver placed in dc instructions. Dominic Sanchezr, CNP      This dictation was generated by voice recognition computer software. Although all attempts are made to edit the dictation for accuracy, there may be errors in the transcription that are not intended.

## 2022-05-27 VITALS
RESPIRATION RATE: 18 BRPM | WEIGHT: 154 LBS | SYSTOLIC BLOOD PRESSURE: 109 MMHG | DIASTOLIC BLOOD PRESSURE: 72 MMHG | HEIGHT: 66 IN | HEART RATE: 88 BPM | BODY MASS INDEX: 24.75 KG/M2 | TEMPERATURE: 98.1 F | OXYGEN SATURATION: 96 %

## 2022-05-27 LAB
ANION GAP SERPL CALCULATED.3IONS-SCNC: 11 MMOL/L (ref 3–16)
BUN BLDV-MCNC: 16 MG/DL (ref 7–20)
CALCIUM SERPL-MCNC: 9.6 MG/DL (ref 8.3–10.6)
CHLORIDE BLD-SCNC: 101 MMOL/L (ref 99–110)
CO2: 25 MMOL/L (ref 21–32)
CREAT SERPL-MCNC: 1 MG/DL (ref 0.6–1.1)
GFR AFRICAN AMERICAN: >60
GFR NON-AFRICAN AMERICAN: 60
GLUCOSE BLD-MCNC: 96 MG/DL (ref 70–99)
POTASSIUM SERPL-SCNC: 4.1 MMOL/L (ref 3.5–5.1)
SODIUM BLD-SCNC: 137 MMOL/L (ref 136–145)

## 2022-05-27 PROCEDURE — 97530 THERAPEUTIC ACTIVITIES: CPT

## 2022-05-27 PROCEDURE — 6370000000 HC RX 637 (ALT 250 FOR IP): Performed by: NURSE PRACTITIONER

## 2022-05-27 PROCEDURE — 97535 SELF CARE MNGMENT TRAINING: CPT

## 2022-05-27 PROCEDURE — 96376 TX/PRO/DX INJ SAME DRUG ADON: CPT

## 2022-05-27 PROCEDURE — G0378 HOSPITAL OBSERVATION PER HR: HCPCS

## 2022-05-27 PROCEDURE — 80048 BASIC METABOLIC PNL TOTAL CA: CPT

## 2022-05-27 PROCEDURE — 6370000000 HC RX 637 (ALT 250 FOR IP): Performed by: HOSPITALIST

## 2022-05-27 PROCEDURE — 6360000002 HC RX W HCPCS: Performed by: HOSPITALIST

## 2022-05-27 PROCEDURE — 36415 COLL VENOUS BLD VENIPUNCTURE: CPT

## 2022-05-27 PROCEDURE — 6370000000 HC RX 637 (ALT 250 FOR IP): Performed by: INTERNAL MEDICINE

## 2022-05-27 PROCEDURE — 96372 THER/PROPH/DIAG INJ SC/IM: CPT

## 2022-05-27 RX ORDER — DIAZEPAM 2 MG/1
2 TABLET ORAL NIGHTLY PRN
Qty: 6 TABLET | Refills: 0 | Status: SHIPPED | OUTPATIENT
Start: 2022-05-27 | End: 2022-06-02

## 2022-05-27 RX ORDER — LEVOFLOXACIN 750 MG/1
750 TABLET ORAL DAILY
Qty: 3 TABLET | Refills: 0 | Status: SHIPPED | OUTPATIENT
Start: 2022-05-28 | End: 2022-05-31

## 2022-05-27 RX ORDER — MECLIZINE HYDROCHLORIDE 25 MG/1
25 TABLET ORAL 3 TIMES DAILY PRN
Qty: 30 TABLET | Refills: 0 | Status: SHIPPED | OUTPATIENT
Start: 2022-05-27 | End: 2022-06-06

## 2022-05-27 RX ORDER — ATORVASTATIN CALCIUM 40 MG/1
40 TABLET, FILM COATED ORAL NIGHTLY
Qty: 30 TABLET | Refills: 3 | Status: SHIPPED | OUTPATIENT
Start: 2022-05-27 | End: 2022-07-21 | Stop reason: SDUPTHER

## 2022-05-27 RX ORDER — ONDANSETRON 4 MG/1
4 TABLET, ORALLY DISINTEGRATING ORAL EVERY 8 HOURS PRN
Qty: 20 TABLET | Refills: 0 | Status: SHIPPED | OUTPATIENT
Start: 2022-05-27

## 2022-05-27 RX ADMIN — LEVOTHYROXINE SODIUM 88 MCG: 0.09 TABLET ORAL at 06:14

## 2022-05-27 RX ADMIN — ACETAMINOPHEN 650 MG: 325 TABLET ORAL at 00:41

## 2022-05-27 RX ADMIN — MECLIZINE 25 MG: 12.5 TABLET ORAL at 06:13

## 2022-05-27 RX ADMIN — ACETAMINOPHEN 650 MG: 325 TABLET ORAL at 06:14

## 2022-05-27 RX ADMIN — LEVOFLOXACIN 750 MG: 500 TABLET, FILM COATED ORAL at 08:19

## 2022-05-27 RX ADMIN — ENOXAPARIN SODIUM 40 MG: 100 INJECTION SUBCUTANEOUS at 08:19

## 2022-05-27 RX ADMIN — MECLIZINE 25 MG: 12.5 TABLET ORAL at 00:08

## 2022-05-27 RX ADMIN — DICLOFENAC SODIUM 2 G: 10 GEL TOPICAL at 08:19

## 2022-05-27 RX ADMIN — FLUTICASONE PROPIONATE 2 SPRAY: 50 SPRAY, METERED NASAL at 08:19

## 2022-05-27 RX ADMIN — ONDANSETRON 4 MG: 2 INJECTION INTRAMUSCULAR; INTRAVENOUS at 09:39

## 2022-05-27 RX ADMIN — MECLIZINE 25 MG: 12.5 TABLET ORAL at 11:56

## 2022-05-27 ASSESSMENT — PAIN DESCRIPTION - PAIN TYPE
TYPE: ACUTE PAIN
TYPE: ACUTE PAIN

## 2022-05-27 ASSESSMENT — PAIN DESCRIPTION - ORIENTATION
ORIENTATION: RIGHT;UPPER
ORIENTATION: RIGHT;ANTERIOR
ORIENTATION_2: RIGHT
ORIENTATION: RIGHT;ANTERIOR

## 2022-05-27 ASSESSMENT — PAIN SCALES - GENERAL
PAINLEVEL_OUTOF10: 3
PAINLEVEL_OUTOF10: 3
PAINLEVEL_OUTOF10: 5

## 2022-05-27 ASSESSMENT — PAIN DESCRIPTION - LOCATION
LOCATION_2: HEAD
LOCATION: HEAD

## 2022-05-27 ASSESSMENT — PAIN DESCRIPTION - INTENSITY: RATING_2: 5

## 2022-05-27 ASSESSMENT — PAIN DESCRIPTION - DESCRIPTORS
DESCRIPTORS_2: ACHING
DESCRIPTORS: ACHING;DISCOMFORT
DESCRIPTORS: ACHING

## 2022-05-27 ASSESSMENT — PAIN - FUNCTIONAL ASSESSMENT
PAIN_FUNCTIONAL_ASSESSMENT: ACTIVITIES ARE NOT PREVENTED
PAIN_FUNCTIONAL_ASSESSMENT_SITE2: PREVENTS OR INTERFERES SOME ACTIVE ACTIVITIES AND ADLS
PAIN_FUNCTIONAL_ASSESSMENT: ACTIVITIES ARE NOT PREVENTED

## 2022-05-27 ASSESSMENT — PAIN DESCRIPTION - ONSET
ONSET: ON-GOING
ONSET: ON-GOING

## 2022-05-27 ASSESSMENT — PAIN DESCRIPTION - FREQUENCY
FREQUENCY: CONTINUOUS
FREQUENCY: CONTINUOUS

## 2022-05-27 NOTE — PROGRESS NOTES
Occupational Therapy  Facility/Department: [facility] B3 - MED SURG  Daily Treatment Note  NAME: Rosemary Jules  : 1975  MRN: [id number]    Date of Service: 2022    Discharge Recommendations:  24 hour supervision or assist     Patient Diagnosis(es): The primary encounter diagnosis was Dizziness. Diagnoses of Vertigo and Hypokalemia were also pertinent to this visit. Assessment    Assessment: Pt performed functional mobility and ADLs with increased time and min A. She required increased time to complete basic self-care d/t dizziness and nausea. CGA/Min A needed for toileting and bathroom mobility/ transfers to toilet/bed with HHA. Recommend 24/7 support at home. Cont OT in acute care. Activity Tolerance: Other (comment) (nausea and dizziness)  Discharge Recommendations: 24 hour supervision or assist      Plan   Plan  Times per Week: 3-5x/week  Current Treatment Recommendations: Self-Care / ADL; Safety education & training;Strengthening;Balance training;Functional mobility training;Patient/Caregiver education & training;Equipment evaluation, education, & procurement     Restrictions  Restrictions/Precautions  Restrictions/Precautions: Up as Tolerated    Subjective   Subjective  Subjective: Pt agreeable to OT. Reports dizziness when turning head to L.  No c/o pain  Cognition  Overall Cognitive Status: WFL        Objective    Vitals /72, HR 88, O2 sats 96% RA     Bed Mobility Training  Bed Mobility Training: Yes  Overall Level of Assistance: Contact-guard assistance (HOB elevated)  Interventions: Verbal cues  Supine to Sit: Contact-guard assistance  Sit to Supine: Contact-guard assistance  Transfer Training  Transfer Training: Yes  Overall Level of Assistance: Minimum assistance (HHA)  Sit to Stand: Contact-guard assistance  Stand to Sit: Contact-guard assistance;Minimum assistance  Stand Pivot Transfers: Contact-guard assistance;Minimum assistance (HHA)  Toilet Transfer: Contact-guard

## 2022-05-27 NOTE — DISCHARGE SUMMARY
Hospital Medicine Discharge Summary    Patient ID: Ila Stacy      Patient's PCP: Natalio Lim MD     Admit Date: 5/24/2022     Discharge Date:   05/27/22    Admitting Provider: Lev Abbott MD     Discharge Provider: Daphney Jacobsen MD     Discharge Diagnoses: Active Hospital Problems    Diagnosis     Vertigo [R42]      Priority: Medium       The patient was seen and examined on day of discharge and this discharge summary is in conjunction with any daily progress note from day of discharge. Hospital Course:     Admitted with dizziness. MRI brain was negative for CVA but showed sinusitis  Vestibular training with PT/OT recommended by neurology. Short course of antibiotics prescribed  Home health care ordered  Ambulates with assistance today. Will be discharged home. Physical Exam Performed:     /72   Pulse 88   Temp 98.1 °F (36.7 °C) (Oral)   Resp 18   Ht 5' 6\" (1.676 m)   Wt 154 lb (69.9 kg)   LMP 05/03/2022   SpO2 96%   BMI 24.86 kg/m²       General appearance:  No apparent distress, appears stated age and cooperative. HEENT:  Normal cephalic, atraumatic without obvious deformity. Pupils equal, round, and reactive to light. Extra ocular muscles intact. Conjunctivae/corneas clear. Neck: Supple, with full range of motion. No jugular venous distention. Trachea midline. Respiratory:  Normal respiratory effort. Clear to auscultation, bilaterally without Rales/Wheezes/Rhonchi. Cardiovascular:  Regular rate and rhythm with normal S1/S2 without murmurs, rubs or gallops. Abdomen: Soft, non-tender, non-distended with normal bowel sounds. Musculoskeletal:  No clubbing, cyanosis or edema bilaterally. Full range of motion without deformity. Skin: Skin color, texture, turgor normal.  No rashes or lesions. Neurologic:  Neurovascularly intact without any focal sensory/motor deficits.  Cranial nerves: II-XII intact, grossly non-focal.  Psychiatric:  Alert and oriented, thought content appropriate, normal insight  Capillary Refill: Brisk,< 3 seconds   Peripheral Pulses: +2 palpable, equal bilaterally       Labs: For convenience and continuity at follow-up the following most recent labs are provided:      CBC:    Lab Results   Component Value Date    WBC 11.7 05/25/2022    HGB 12.3 05/25/2022    HCT 36.4 05/25/2022     05/25/2022       Renal:    Lab Results   Component Value Date     05/27/2022    K 4.1 05/27/2022    K 3.1 05/24/2022     05/27/2022    CO2 25 05/27/2022    BUN 16 05/27/2022    CREATININE 1.0 05/27/2022    CALCIUM 9.6 05/27/2022         Significant Diagnostic Studies    Radiology:   MRI brain without contrast   Final Result   1. No evidence of acute infarct. 2. Asymmetric paranasal sinus disease with greatest involvement in the right   frontal sinus and right anterior ethmoid air cells. CTA HEAD NECK W CONTRAST   Final Result   No apparent arterial high grade stenosis, occlusion or aneurysm within the   head or neck. CT HEAD WO CONTRAST   Final Result   No acute abnormality. Consults:     IP CONSULT TO NEUROLOGY    Disposition:  Home     Condition at Discharge: Stable    Discharge Instructions/Follow-up:  PCP    Code Status:  Full Code     Activity: activity as tolerated    Diet: regular diet      Discharge Medications:     Current Discharge Medication List           Details   diazePAM (VALIUM) 2 MG tablet Take 1 tablet by mouth nightly as needed (dizziness) for up to 6 days.   Qty: 6 tablet, Refills: 0    Associated Diagnoses: Dizziness; Vertigo      meclizine (ANTIVERT) 25 MG tablet Take 1 tablet by mouth 3 times daily as needed for Dizziness  Qty: 30 tablet, Refills: 0      ondansetron (ZOFRAN-ODT) 4 MG disintegrating tablet Take 1 tablet by mouth every 8 hours as needed for Nausea or Vomiting  Qty: 20 tablet, Refills: 0      atorvastatin (LIPITOR) 40 MG tablet Take 1 tablet by mouth nightly  Qty: 30

## 2022-05-27 NOTE — DISCHARGE INSTR - COC
PHYSICIAN SECTION    Prognosis: Good    Condition at Discharge: Stable    Rehab Potential (if transferring to Rehab): Good    Recommended Labs or Other Treatments After Discharge: PT and OT for vestibular and balance training, skilled nursing. Physician Certification: I certify the above information and transfer of Nerissa Nielsen  is necessary for the continuing treatment of the diagnosis listed and that she requires [address] for less 30 days.      Update Admission H&P: No change in H&P    PHYSICIAN SIGNATURE:  Electronically signed by Shelli Smith MD on 5/27/22 at 9:04 AM EDT

## 2022-05-27 NOTE — CARE COORDINATION
Per nursing, provider ordered home care for balance/vestibular training. Pt does not qualify for [facility], as she is not home bound. Vestibular training would have to be done outpatient - if offered at [facility]. Nursing updated pt, who voiced understanding.

## 2022-05-31 NOTE — CARE COORDINATION
Received call from patient's  requesting home care. Review of chart patient is supposed to follow up with outpatient.  notified and agreeable. No other needs at this time.  Electronically signed by YANETH Meza LISW-S on 5/31/2022 at 1:25 PM

## 2022-06-07 ENCOUNTER — HOSPITAL ENCOUNTER (OUTPATIENT)
Dept: PHYSICAL THERAPY | Age: 47
Setting detail: THERAPIES SERIES
Discharge: HOME | End: 2022-06-07
Payer: MEDICAID

## 2022-06-07 PROCEDURE — 97112 NEUROMUSCULAR REEDUCATION: CPT

## 2022-06-07 PROCEDURE — 97161 PT EVAL LOW COMPLEX 20 MIN: CPT

## 2022-06-07 NOTE — FLOWSHEET NOTE
[facility]  80[facility], [facility], [facility], [address] [place] [address]  Phone: [phone]  Fax [phone]    Physical Therapy Daily Treatment Note    Date:  2022    Patient Name:  Eran Ray    :  1975  MRN: [id number]  Restrictions/Precautions:    Medical/Treatment Diagnosis Information:  · Diagnosis: vertigo, BPPV  Insurance/Certification information:  PT Insurance Information: [place]  Referring Physician:   Carson Carpio MD; seeing new PCP on Thursday, Venkat French CNP                             Plan of care signed (Y/N):  N  Visit# / total visits:       G-Code (if applicable):           Physical FS Primary Measure 39  Predicted Points of Change  18  Predicted # of visits   8  Predicted Discharge FS Score 57    Medicare Cap (if applicable):  n/a = total amount used, updated 2022    Time in:   8:05      Timed Treatment: 10 Total Treatment Time:  40  Time out: 8:45  ________________________________________________________________________________________    Pain Level:    /10  SUBJECTIVE:  See initial eval    OBJECTIVE:     Exercise/Equipment Resistance/Repetitions Other comments          VORx1 object near and far Horizontal and vertical HEP                                                                                                        Consider positional testing if indicated  NV                    Other Therapeutic Activities:  Education regarding POC including demonstration with models of anatomy and physiology in order to maximize benefits of treatment; total neuro re-ed 10 minutes    Manual Treatments:         Modalities:      Test/Measurements:  See initial eval       ASSESSMENT: See initial eval        Treatment/Activity Tolerance:   [x]Patient tolerated treatment well [] Patient limited by fatique  []Patient limited by pain [] Patient limited by other medical complications  [] Other:     GOALS: Goals established 6/7/22   Patient stated goal: self care, daily work  []? Progressing: []? Met: []? Not Met: []? Adjusted     Therapist goals for Patient:  Short Term Goals: To be achieved in: 2 weeks  1. Independent in HEP and progression per patient tolerance, in order to prevent re-injury. []? Progressing: []? Met: []? Not Met: []? Adjusted  2. Patient will have a decrease in dizziness/imbalance/symptoms to facilitate improvement in movement, function, balance, and ADLS as indicated by Functional Deficits. []? Progressing: []? Met: []? Not Met: []? Adjusted     Long Term Goals: To be achieved in:  6 weeks  1. Improve by FOTO predictive value to assist with reaching prior level of function  []? Progressing: []? Met: []? Not Met: []? Adjusted  2. Patient will demonstrate increased cervical AROM to WNL, joint mobility WNL to allow for proper joint functioning as indicated by Functional Deficits. []? Progressing: []? Met: []? Not Met: []? Adjusted  3. Patient will demonstrate negative oculomotor special testing/positional testing as indicated by Functional Deficits. []? Progressing: []? Met: []? Not Met: []? Adjusted  4. Patient will return to functional activities including rolling over, bending over, looking up, turning around without increased symptoms or restriction. []? Progressing: []? Met: []? Not Met: []? Adjusted  5. Patient will demonstrate [facility] balance on Modified CTSIB  []? Progressing: []? Met: []? Not Met: []?  Adjusted       Plan: [] Continue per plan of care [] Alter current plan (see comments)   [x] Plan of care initiated [] Hold pending MD visit [] Discharge      Plan for Next Session:  Vestibular hypofunction protocol    Re-Certification Due Date:         Signature:  Abbie Quiroz PT

## 2022-06-07 NOTE — PROGRESS NOTES
[facility], [facility], [address], [address]  Phone: [phone]  Fax [phone]    Physical Therapy Vestibular Rehabilitation Evaluation  Date:  2022    Dear  Odalys Austin MD, (hospitalist)    We had the pleasure of evaluating the following patient for physical therapy services at [address] [place] [address]. A summary of our findings can be found in the initial assessment below. This includes our plan of care. If you have any questions or concerns regarding these findings, please do not hesitate to contact me at the office phone number checked above. Thank you for the referral.      Physician Signature:_______________________________Date:__________________     By signing above (or electronic signature), therapists plan is approved by physician       Patient: Azucena Tee   : 1975   MRN: [id number]   Referring Physician:   Odalys Austin MD; seeing new PCP on Thursday, Miguel Angel Sommers CNP      Evaluation Date: 2022       Medical Diagnosis Information:   Diagnosis: vertigo, BPPV                                               Insurance information: PT Insurance Information: [place]     Precautions/ Contra-indications: none   Latex Allergy:  NO        Preferred Language for Healthcare:   English       other:     Time in:   8:05      Timed Treatment: 10 Total Treatment Time:  40  ______________________________________________________________________    SUBJECTIVE:      Referral Date: 22  Onset Date: 22  Relevant Medical History: HTN, hypothyroid    Chief Complaint: unsteadiness at this point, vertigo resolved but still \"dizzy\"  Setting in which symptoms first occurred: Patient reports that she woke up in the middle of the night and felt as if she was falling out of bed the room was spinning so violently.  She has dealt with low blood pressure before, so her  propped up her legs but within 10 minutes it hadn't stopped and she was starting to vomit due to the intensity of the spinning. Called 911 and spent 3-4 days in the hospital. Could not open her eyes the room was spinning so much. Discharged with a 5 day course of antibiotics and told to try Epley maneuver. Patient researched her symptoms and discovered that it could be labyrinthitis. Over the last few days, the room has stopped spinning, but she still has to move very slowly so as to avoid feeling \"disconnected\". She denies any room spinning with rolling over or looking up or bending over, but is very \"off\" when she does those things. She also cannot walk without assistance from  still. Description of symptoms: [x] Vertigo [x]  Off-balance [] Lightheadedness  Scale: Did not describe/10  Symptoms are getting:  [x] Better [] Worse  [] Same [] Episodic  Description of Spells:  [x] Constant [x] Spontaneous [x] Induced by motion   [x] Induced by position changes [] Other:  Length of time spells occur: [] Seconds [] Minutes  [] Hours [x] Days [] Other:   What increases symptoms? Moving quickly, standing and walking, bending over, rolling over  What decreases symptoms?  Moving slower, sitting down    Hearing impairments:   [] Yes  [x] No  [] Other:  Hearing changes since onset:  [] Yes  [x] No  [] Other:  Visual changes since onset:  [x] Yes  [] No  [x] Other: difficulty concentrating   Recent falls:    [] Yes  [x] No   Comments:    History of migraines/HA:  [] Yes  [x] No  Comments:  Previous treatments:  Job requirements/work status: PhD student, , works part [address] [employer], 3 children 23, 21, 8   Living Status/Prior Level of Function: Prior to this injury / incident, patient was independent with ADLs and IADLs     C-SSRS Triggered by Intake questionnaire (Past 2 wk assessment):   [x] No, Questionnaire did not trigger screening.   [] Yes, Patient intake triggered further evaluation      [] C-SSRS Screening completed  [] PCP notified via Plan of Care  [] Emergency services notified    OBJECTIVE:     Musculoskeletal Screen  Cervical spine complaints: pre-existing neck pain with B carpal tunnel, improving with chiropractor   Cervical Pain:  2/10  Cervical spine ROM:  [x]  WNL [] Impaired:    LE ROM: WFL unless noted    RIGHT LEFT    Hip      Knee      Ankle       LE Strength: WFL unless noted   RIGHT LEFT    Hip flexion      Hip IR      Hip ER      Knee extension      Knee flexion      Ankle       Posture:  WNL     Somatosensory:  Light touch:  [x] Normal [] Impaired Comments:    Coordination:  Rapid alternating movements: [x] Normal [] Dysdiadokinesia   Finger to Nose:   [x] Normal [] Dysmetric  Heel to Shin:    [x] Normal [] Ataxic    Postural Control Tests:  Clinical Test of Sensory Interaction for Balance (CTSIB) performed in Romberg stance  CONDITION TIME STRATEGY SWAY    Eyes open, firm surface 30 ankle min    Eyes closed, firm surface 30 ankle Mod-max    Eyes open, foam surface 30 ankle mod    Eyes closed, foam surface 0 stepping LOB     Tandem stance: DNT    Gait:    Assistive Device: N - but holding onto walls//PT  Orthosis: N   At self-initiated pace:  Maria Luisa: slowed  KATHRYN: wide        Arm swing: no   Head/trunk rotation: no      Straight path: no  Swerves: yes      Staggers: yes   Side-steps: yes   Gait speed: slowed    Oculomotor/Vestibular Examination:    Spontaneous nystagmus:  [] Left  [] Right [x] Absent  Gaze-Evoked nystagmus with fixation present:   Primary [] Present [x] Absent   Right  [] Present [x] Absent   Left  [] Present [x] Absent  VOR Head Thrust Test: [] Normal [x] Abnormal  Comments: delayed to L  VOR Cancellation:  [x] Normal [] Abnormal Comments:   Smooth Pursuit:  [x] Normal [] Abnormal Comments:   Saccades:   [x] Normal [] Abnormal Comments:   Convergence:   [x] Normal [] Abnormal Comments:     Positional Testing - DNT this date as subjective history not indicative - will assess at future visits should symptoms indicate  R Hallpike-Magna maneuver:    Nystagmus:  [] Yes  [] No  [] Duration:       [] Direction:    Vertigo:  [] Yes  [] No  [] Duration:   L Hallpike-Edwin maneuver:   Nystagmus:  [] Yes  [] No  [] Duration:       [] Direction:    Vertigo:  [] Yes  [] No  [] Duration:   Supine roll head right:   Nystagmus:  [] Yes  [] No  [] Duration:       [] Direction:    Vertigo:  [] Yes  [] No  [] Duration:   Supine roll head left:   Nystagmus:  [] Yes  [] No  [] Duration:       [] Direction:    Vertigo:  [] Yes  [] No  [] Duration:     [x] Patient history, allergies, meds reviewed. Medical chart reviewed. See intake form. Review Of Systems (ROS):  [x]Performed Review of systems (Integumentary, CardioPulmonary, Neurological) by intake and observation. Intake form has been scanned into medical record. Patient has been instructed to contact their primary care physician regarding ROS issues if not already being addressed at this time.         Co-morbidities/Complexities (which will affect course of rehabilitation):   []None           Arthritic conditions   []Rheumatoid arthritis (M05.9)  []Osteoarthritis (M19.91)   Cardiovascular conditions   [x]Hypertension (I10)  []Hyperlipidemia (E78.5)  []Angina pectoris (I20)  []Atherosclerosis (I70)   Musculoskeletal conditions   []Disc pathology   []Congenital spine pathologies   []Prior surgical intervention  []Osteoporosis (M81.8)  []Osteopenia (M85.8)   Endocrine conditions   [x]Hypothyroid (E03.9)  []Hyperthyroid Gastrointestinal conditions   []Constipation (Y37.57)   Metabolic conditions   []Morbid obesity (E66.01)  []Diabetes type 1(E10.65) or 2 (E11.65)   []Neuropathy (G60.9)     Pulmonary conditions   []Asthma (J45)  []Coughing   []COPD (J44.9)   Psychological Disorders  []Anxiety (F41.9)  []Depression (F32.9)   []Other:   []Other:            Barriers to/and or personal factors that will affect rehab potential:             []Age  []Sex    []Smoker []Motivation/Lack of Motivation                        []Co-Morbidities              []Cognitive Function, education/learning barriers              []Environmental, home barriers              []profession/work barriers  []past PT/medical experience  [x]other:none  Justification:     Falls Risk Assessment (30 days):   [x] Falls Risk assessed and no intervention required. [] Falls Risk assessed and Patient requires intervention due to being higher risk   [] TUG score (>12s at risk):  [] Falls education provided, including     ASSESSMENT: Patient demonstrated positive head impulse test indicating L vestibular hypofunction, consistent with a labyrinthitis or neuritis of the L inner ear. Positional testing not performed this date and subjective history not indicative. Recommend skilled, outpatient vestibular rehab to improve vestibular function and return patient to functional baseline.      Functional Impairments:     [] BPPV    [] Right [] Posteror Canal [] Canalithiasis    [] Left  [] Horizontal Canal [] Cupulolithiasis   [x]Decreased Gaze Stabilization   []Increased Motion Sensitivity   [x]Unilateral Vestibular Hypofunction   []Bilateral Vestibular Hypofunction   [x]Gait Instability   []Decreased tolerance for ADLs       []Decreased functional strength    [x]Reduced Balance/Proprioceptive control     []Reduced ability to hear/focus    []Noted cervical/thoracic/GHJ joint hypomobility    []Noted cervical/thoracic/GHJ joint hypermobility    []Decreased cervical/UE functional ROM    []Noted Headache pain aggravated by neck movements with/without dizziness    []Abnormal reflexes/sensation/myotomal/dermatomal deficits    []Decreased DCF control or ability to hold head up    []Decreased RC/scapular/core strength and neuromuscular control    []Other:     Functional Activity Limitations (from functional questionnaire and intake)   []Reduced ability to tolerate prolonged functional positions   [x]Reduced ability or difficulty with changes of positions or transfers between positions    [x]Reduced ability to transfer in/out of bed or rolling in bed    [x]Reduced ability or tolerance with driving, reading and/or computer work    []Reduced ability to perform lifting, reaching, carrying tasks    [x]Reduced ability to forward bend   [x]Reduced ability to ambulate prolonged functional periods/distances/surfaces    [x]Reduced ability to ascend/descend stairs    [x]Reduced ability to concentrate/focus    [x]Reduced ability to turn/pitch head rapidly    [x]Reduced ability to self-correct for losses of balance   []Other:        Participation Restrictions    [x]Reduced participation in self care activities    [x]Reduced participation in home management activities    [x]Reduced participation in work activities    [x]Reduced participation in social activities    [x]Reduced participation in sport/recreational activities    Classification :    []Signs/symptoms consistent with BPPV (benign paroxysmal positional vertigo)       [x]Signs/symptoms consistent with unilateral peripheral vestibulopathy (i.e., vestibular neuritis, labyrinthitis, acoustic neuroma)     []Signs/symptoms consistent with central vestibulopathy    []Signs/symptoms consistent with migraine-related vestibulopathy    []Signs/symptoms consistent with Menieres disease / post-traumatic hydrops    []Signs/symptoms consistent with perilymphatic fistula    []Signs/symptoms consistent with cervicogenic dizziness    [x]Signs/symptoms consistent with gait instability    []Signs/symptoms consistent with motion sensitivity      []Signs/symptoms consistent with neck pain with mobility deficits      []Signs/symptoms consistent with neck pain with movement coordinated impairments     []Signs/symptoms consistent with neck pain with radiating pain     []Signs/symptoms consistent with neck pain with headaches (cervicogenic)     []Signs/symptoms consistent with nerve root involvement including myotome & dermatome dysfunction    []Signs/symptoms consistent with facet dysfunction of cervical and thoracic spine     []Signs/symptoms consistent suggesting central cord compression/UMN syndromes    []Signs/symptoms consistent with discogenic cervical pain    []Signs/symptoms consistent with rib dysfunction    []Signs/symptoms consistent with postural dysfunction    []Signs/symptoms consistent with shoulder pathology     []Signs/symptoms consistent with post-surgical status including decreased ROM, strength and function. []Signs/symptoms which may limit the use of advanced manual therapy techniques: (Elevated CV risk profile, recent trauma, intolerance to end range positions, prior TIA, visual issues, UE neurological compromise)    []Other:      Prognosis/Rehab Potential:      []Excellent    [x]Good    []Fair    []Poor    Tolerance of evaluation/treatment:     []Excellent    [x]Good    []Fair    []Poor     Physical Therapy Evaluation Complexity Justification   [x] A history of present problem with:  [x] no personal factors and/or comorbidities that impact the plan of care;  []1-2 personal factors and/or comorbidities that impact the plan of care  []3 personal factors and/or comorbidities that impact the plan of care  [x] An examination of body systems using standardized tests and measures addressing any of the following: body structures and functions (impairments), activity limitations, and/or participation restrictions;:  [x] a total of 1-2 or more elements   [] a total of 3 or more elements   [] a total of 4 or more elements   [x] A clinical presentation with:  [] stable and/or uncomplicated characteristics   [x] evolving clinical presentation with changing characteristics  [] unstable and unpredictable characteristics;   [x] Clinical decision making of low complexity using standardized patient assessment instrument and/or measurable assessment of functional outcome.      []EVAL (LOW) 00891 (typically 20 minutes face-to-face)   []EVAL (MOD) 59106 (typically 30 minutes face-to-face)   []EVAL (HIGH) 30236 (typically 45 minutes face-to-face)   []RE-EVAL    PLAN:   Today's Treatment:    [] See flowsheet   [] Patient treated with canalith repositioning maneuver   [] Education materials provided on BPPV/Vestibular Dysfunction   [] Precautions provided and patient to follow precautions for next 24 hours in regards to BPPV management   [] Written home exercise instructions   [] Other:    Frequency/Duration:  1-2 days per week for 6 Weeks:  Interventions:   [x]Therapeutic exercise including: strength training and ROM for Upper extremity, Lower extremity, spine, and Core    [x]NMR activation and proprioception for BLEs, vestibular training, balance, and coordination    [x]Manual therapy as indicated for UE, LE and spine to include: Dry Needling/IASTM, STM, PROM, Gr I-IV mobilizations, manipulation. [x]Vestibular rehabilitation to include canalith repositioning maneuvers, gaze stabilization, and habituation as indicated   [x]Gait training   []WC training   []Home Management training of patient and/or caregiver   []Modalities as needed that may include: thermal agents, E-stim, Biofeedback, US, iontophoresis as indicated   [x]Patient education on BPPV/vestibular function, balance, postural re-education, activity modification, progression of HEP. []Other:     GOALS: Goals established 6/7/22   Patient stated goal: self care, daily work  [] Progressing: [] Met: [] Not Met: [] Adjusted    Therapist goals for Patient:  Short Term Goals: To be achieved in: 2 weeks  1. Independent in HEP and progression per patient tolerance, in order to prevent re-injury. [] Progressing: [] Met: [] Not Met: [] Adjusted  2. Patient will have a decrease in dizziness/imbalance/symptoms to facilitate improvement in movement, function, balance, and ADLS as indicated by Functional Deficits.    [] Progressing: [] Met: [] Not Met: [] Adjusted    Long Term Goals: To be achieved in:  6 weeks  1. Improve by FOTO predictive value to assist with reaching prior level of function  [] Progressing: [] Met: [] Not Met: [] Adjusted  2. Patient will demonstrate increased cervical AROM to WNL, joint mobility WNL to allow for proper joint functioning as indicated by Functional Deficits. [] Progressing: [] Met: [] Not Met: [] Adjusted  3. Patient will demonstrate negative oculomotor special testing/positional testing as indicated by Functional Deficits. [] Progressing: [] Met: [] Not Met: [] Adjusted  4. Patient will return to functional activities including rolling over, bending over, looking up, turning around without increased symptoms or restriction. [] Progressing: [] Met: [] Not Met: [] Adjusted  5.  Patient will demonstrate [place] [place] balance on Modified CTSIB  [] Progressing: [] Met: [] Not Met: [] Adjusted    Electronically signed by:  Pedro Whitaker PT , DPT [id number]

## 2022-06-09 ENCOUNTER — HOSPITAL ENCOUNTER (OUTPATIENT)
Dept: PHYSICAL THERAPY | Age: 47
Setting detail: THERAPIES SERIES
Discharge: HOME | End: 2022-06-09
Payer: MEDICAID

## 2022-06-09 ENCOUNTER — OFFICE VISIT (OUTPATIENT)
Dept: FAMILY MEDICINE CLINIC | Age: 47
End: 2022-06-09
Payer: MEDICAID

## 2022-06-09 VITALS
WEIGHT: 162 LBS | BODY MASS INDEX: 26.15 KG/M2 | SYSTOLIC BLOOD PRESSURE: 120 MMHG | DIASTOLIC BLOOD PRESSURE: 80 MMHG | OXYGEN SATURATION: 98 % | HEART RATE: 97 BPM

## 2022-06-09 DIAGNOSIS — Z09 HOSPITAL DISCHARGE FOLLOW-UP: ICD-10-CM

## 2022-06-09 DIAGNOSIS — E03.9 HYPOTHYROIDISM, UNSPECIFIED TYPE: ICD-10-CM

## 2022-06-09 DIAGNOSIS — R42 LIGHT HEADED: Primary | ICD-10-CM

## 2022-06-09 LAB
T4 FREE: 1.4 NG/DL (ref 0.9–1.8)
TSH SERPL DL<=0.05 MIU/L-ACNC: 7.17 UIU/ML (ref 0.27–4.2)

## 2022-06-09 PROCEDURE — 99213 OFFICE O/P EST LOW 20 MIN: CPT | Performed by: NURSE PRACTITIONER

## 2022-06-09 PROCEDURE — G8419 CALC BMI OUT NRM PARAM NOF/U: HCPCS | Performed by: NURSE PRACTITIONER

## 2022-06-09 PROCEDURE — G8427 DOCREV CUR MEDS BY ELIG CLIN: HCPCS | Performed by: NURSE PRACTITIONER

## 2022-06-09 PROCEDURE — 1036F TOBACCO NON-USER: CPT | Performed by: NURSE PRACTITIONER

## 2022-06-09 PROCEDURE — 97112 NEUROMUSCULAR REEDUCATION: CPT

## 2022-06-09 PROCEDURE — 1111F DSCHRG MED/CURRENT MED MERGE: CPT | Performed by: NURSE PRACTITIONER

## 2022-06-09 RX ORDER — LEVOTHYROXINE SODIUM 88 UG/1
88 TABLET ORAL DAILY
Qty: 30 TABLET | Refills: 3 | Status: SHIPPED | OUTPATIENT
Start: 2022-06-09 | End: 2022-10-31

## 2022-06-09 RX ORDER — ERGOCALCIFEROL (VITAMIN D2) 1250 MCG
CAPSULE ORAL
COMMUNITY
Start: 2022-01-18 | End: 2022-06-09

## 2022-06-09 RX ORDER — PREDNISONE 10 MG/1
TABLET ORAL
Qty: 18 TABLET | Refills: 0 | Status: SHIPPED | OUTPATIENT
Start: 2022-06-09 | End: 2022-06-09 | Stop reason: SDUPTHER

## 2022-06-09 RX ORDER — PREDNISONE 10 MG/1
TABLET ORAL
Qty: 18 TABLET | Refills: 0 | Status: SHIPPED | OUTPATIENT
Start: 2022-06-09 | End: 2022-07-21

## 2022-06-09 RX ORDER — MECLIZINE HYDROCHLORIDE 25 MG/1
25 TABLET ORAL 3 TIMES DAILY PRN
COMMUNITY

## 2022-06-09 NOTE — FLOWSHEET NOTE
[facility], [facility], [address] [place] [address]  Phone: [phone]  Fax [phone]    Physical Therapy Daily Treatment Note    Date:  2022    Patient Name:  Solange Bailey    :  1975  MRN: [id number]  Restrictions/Precautions:    Medical/Treatment Diagnosis Information:  · Diagnosis: vertigo, BPPV  Insurance/Certification information:  PT Insurance Information: [place]  Referring Physician:   Madhav Chao MD; seeing new PCP on Thursday, Autumn Paul CNP                             Plan of care signed (Y/N):  N  Visit# / total visits:       G-Code (if applicable):           Physical FS Primary Measure 39  Predicted Points of Change  18  Predicted # of visits   8  Predicted Discharge FS Score 57    Medicare Cap (if applicable):  n/a = total amount used, updated 2022    Time in:   4:15      Timed Treatment: 35 Total Treatment Time:  35  Time out: 4:50  ________________________________________________________________________________________    Pain Level:    /10  SUBJECTIVE:  Patient reports that the last two days have been a little worse, not room spinning, but more unsteady. OBJECTIVE:     Exercise/Equipment Resistance/Repetitions Other comments          VORx1 object near and far Horizontal and vertical in sitting HEP progress to standing once able                                                                                                                              Other Therapeutic Activities:  Education regarding POC including demonstration with models of anatomy and physiology in order to maximize benefits of treatment; total neuro re-ed 10 minutes    Manual Treatments:         Modalities:      Test/Measurements:      R Hallpike-Tamarack maneuver:               Nystagmus:     []? Yes             [x]?  No               []? Duration:                                           []? Direction: Vertigo:            []? Yes             [x]? No               []? Duration:   L Hallpike-Pritchett maneuver:              Nystagmus:     []? Yes             [x]? No               []? Duration:                                           []? Direction:                  Vertigo:            [x]? Yes             []? No               [x]? Duration: not room spinning, but she felt uneasy the entire time, no latency and non-fatiguing  Supine roll head right:              Nystagmus:     []? Yes             [x]? No               []? Duration:                                           []? Direction:                  Vertigo:            []? Yes             [x]? No               []? Duration:   Supine roll head left:              Nystagmus:     []? Yes             [x]? No               []? Duration:                                           []? Direction:                  Vertigo:            []? Yes             [x]? No               []? Duration:         ASSESSMENT: Patient was largely negative for positional vertigo, though she did note that there was a difference between her L and R, but did not endorse any vertiginous experience and no nystagmus observed. Symptoms appear consistent with labyrinthitis or neuritis. She is starting a steroid dose pack today. Progress as pt tolerates. Treatment/Activity Tolerance:   [x]Patient tolerated treatment well [] Patient limited by fatique  []Patient limited by pain [] Patient limited by other medical complications  [] Other:     GOALS: Goals established 6/7/22   Patient stated goal: self care, daily work  []? Progressing: []? Met: []? Not Met: []? Adjusted     Therapist goals for Patient:  Short Term Goals: To be achieved in: 2 weeks  1. Independent in HEP and progression per patient tolerance, in order to prevent re-injury. []? Progressing: []? Met: []? Not Met: []? Adjusted  2.  Patient will have a decrease in dizziness/imbalance/symptoms to facilitate improvement in movement, function, balance, and ADLS as indicated by Functional Deficits. []? Progressing: []? Met: []? Not Met: []? Adjusted     Long Term Goals: To be achieved in:  6 weeks  1. Improve by FOTO predictive value to assist with reaching prior level of function  []? Progressing: []? Met: []? Not Met: []? Adjusted  2. Patient will demonstrate increased cervical AROM to WNL, joint mobility WNL to allow for proper joint functioning as indicated by Functional Deficits. []? Progressing: []? Met: []? Not Met: []? Adjusted  3. Patient will demonstrate negative oculomotor special testing/positional testing as indicated by Functional Deficits. []? Progressing: []? Met: []? Not Met: []? Adjusted  4. Patient will return to functional activities including rolling over, bending over, looking up, turning around without increased symptoms or restriction. []? Progressing: []? Met: []? Not Met: []? Adjusted  5. Patient will demonstrate [facility] [place] balance on Modified CTSIB  []? Progressing: []? Met: []? Not Met: []?  Adjusted       Plan: [x] Continue per plan of care [] Alter current plan (see comments)   [] Plan of care initiated [] Hold pending MD visit [] Discharge      Plan for Next Session:  Vestibular hypofunction protocol    Re-Certification Due Date:         Signature:  Yasmeen Garza PT

## 2022-06-09 NOTE — PROGRESS NOTES
Post-Discharge Transitional Care  Follow Up      Mio Jeffrey   YOB: 1975    Date of Office Visit:  6/9/2022  Date of Hospital Admission: 5/24/22  Date of Hospital Discharge: 5/27/22  Risk of hospital readmission (high >=14%. Medium >=10%) :No data recorded    Care management risk score Rising risk (score 2-5) and Complex Care (Scores >=6): 0     Non face to face  following discharge, date last encounter closed (first attempt may have been earlier): *No documented post hospital discharge outreach found in the last 14 days    Call initiated 2 business days of discharge: *No response recorded in the last 14 days    ASSESSMENT/PLAN:   Hospital discharge follow-up  -     [place] DISCHARGE MEDS RECONCILED W/ CURRENT OUTPATIENT MED LIST  Hypothyroidism, unspecified type  -     TSH; Future  -     T4, Free; Future  -     levothyroxine (SYNTHROID) 88 MCG tablet; Take 1 tablet by mouth Daily, Disp-30 tablet, R-3Normal  Light headed      Medical Decision Making: low complexity  No follow-ups on file. Subjective:   HPI:  Follow up of Hospital problems/diagnosis(es):   Light-headed- labs and imaging at the hospital normal  Saw PT- likely neuritis or labyrinthitis. States she is doing better than she was last week. Still having some light headedness. Inpatient course: Discharge summary reviewed- see chart. Interval history/Current status: stable    Patient Active Problem List   Diagnosis    Allergic rhinitis    Hypoactive thyroid    Pain in shoulder    Personal history of other diseases of the musculoskeletal system and connective tissue    History of female genital system disorder    Personal history of other specified conditions    Vitamin D deficiency    Mixed hyperlipidemia    Vertigo       Medications listed as ordered at the time of discharge from hospital     Medication List          Accurate as of June 9, 2022  4:26 PM. If you have any questions, ask your nurse or doctor. START taking these medications    predniSONE 10 MG tablet  Commonly known as: DELTASONE  Take 3 tabs for days 1-3, take 2 tabs day 4-6, take 1 tab days 7-9.   Started by: CLARE Oconnor CNP        CONTINUE taking these medications    atorvastatin 40 MG tablet  Commonly known as: LIPITOR  Take 1 tablet by mouth nightly     diclofenac sodium 1 % Gel  Commonly known as: VOLTAREN  Apply topically 2 times daily     fluticasone 50 MCG/ACT nasal spray  Commonly known as: FLONASE     levothyroxine 88 MCG tablet  Commonly known as: SYNTHROID  Take 1 tablet by mouth Daily     meclizine 25 MG tablet  Commonly known as: ANTIVERT     ondansetron 4 MG disintegrating tablet  Commonly known as: ZOFRAN-ODT  Take 1 tablet by mouth every 8 hours as needed for Nausea or Vomiting        STOP taking these medications    vitamin D 60092 UNIT Caps  Commonly known as: CHOLECALCIFEROL  Stopped by: CLARE Oconnor CNP           Where to Get Your Medications      These medications were sent to [facility] [id number] [phone] [facility] [place] [phone]  [address] [place] [address]    Phone: [phone]   · levothyroxine 88 MCG tablet  · predniSONE 10 MG tablet           Medications marked \"taking\" at this time  Outpatient Medications Marked as Taking for the 6/9/22 encounter (Office Visit) with CLARE Oconnor CNP   Medication Sig Dispense Refill    meclizine (ANTIVERT) 25 MG tablet Take 25 mg by mouth 3 times daily as needed      levothyroxine (SYNTHROID) 88 MCG tablet Take 1 tablet by mouth Daily 30 tablet 3    predniSONE (DELTASONE) 10 MG tablet Take 3 tabs for days 1-3, take 2 tabs day 4-6, take 1 tab days 7-9. 18 tablet 0    ondansetron (ZOFRAN-ODT) 4 MG disintegrating tablet Take 1 tablet by mouth every 8 hours as needed for Nausea or Vomiting 20 tablet 0    atorvastatin (LIPITOR) 40 MG tablet Take 1 tablet by mouth nightly 30 tablet 3    fluticasone (FLONASE) 50 MCG/ACT nasal spray 2 sprays by Nasal route          Medications patient taking as of now reconciled against medications ordered at time of hospital discharge: Yes    A comprehensive review of systems was negative except for what was noted in the HPI. Objective:    /80   Pulse 97   Wt 162 lb (73.5 kg)   LMP 06/01/2022   SpO2 98%   BMI 26.15 kg/m²   General Appearance: alert and oriented to person, place and time, well developed and well- nourished, in no acute distress  Skin: warm and dry, no rash or erythema  Head: normocephalic and atraumatic  Eyes: pupils equal, round, and reactive to light, extraocular eye movements intact, conjunctivae normal  ENT: tympanic membrane, external ear and ear canal normal bilaterally, nose without deformity, nasal mucosa and turbinates normal without polyps  Neck: supple and non-tender without mass, no thyromegaly or thyroid nodules, no cervical lymphadenopathy  Pulmonary/Chest: clear to auscultation bilaterally- no wheezes, rales or rhonchi, normal air movement, no respiratory distress  Cardiovascular: normal rate, regular rhythm, normal S1 and S2, no murmurs, rubs, clicks, or gallops, distal pulses intact, no carotid bruits  Abdomen: soft, non-tender, non-distended, normal bowel sounds, no masses or organomegaly  Extremities: no cyanosis, clubbing or edema  Musculoskeletal: normal range of motion, no joint swelling, deformity or tenderness  Neurologic: reflexes normal and symmetric, no cranial nerve deficit, gait, coordination and speech normal      An electronic signature was used to authenticate this note.   --CLARE Holcomb - CNP

## 2022-06-20 ENCOUNTER — HOSPITAL ENCOUNTER (OUTPATIENT)
Dept: PHYSICAL THERAPY | Age: 47
Setting detail: THERAPIES SERIES
Discharge: HOME | End: 2022-06-20
Payer: MEDICAID

## 2022-06-20 PROCEDURE — 97112 NEUROMUSCULAR REEDUCATION: CPT

## 2022-06-20 NOTE — FLOWSHEET NOTE
[facility], [facility], [address], [address]  Phone: [phone]  Fax [phone]    Physical Therapy Daily Treatment Note    Date:  2022    Patient Name:  Joe Peterson    :  1975  MRN: [id number]  Restrictions/Precautions:    Medical/Treatment Diagnosis Information:  · Diagnosis: vertigo, BPPV  Insurance/Certification information:  PT Insurance Information: Areli Lopez  Referring Physician:   Jessica Moore MD; seeing new PCP on Thursday, Darius Valente CNP                             Plan of care signed (Y/N):  N  Visit# / total visits:  3/12     G-Code (if applicable):           Physical FS Primary Measure 39  Predicted Points of Change  18  Predicted # of visits   8  Predicted Discharge FS Score 57    Medicare Cap (if applicable):  n/a = total amount used, updated 2022    Time in:  8:30       Timed Treatment: 45 Total Treatment Time:  45  Time out: 9:15  ________________________________________________________________________________________    Pain Level:    /10  SUBJECTIVE:  Patient reports that she has completed the steroid and has noticed improvement. Able to walk without assist at this point. Exercises are going well but up and down is worse than side to side. Also has to be careful with moving too quickly.      OBJECTIVE:     Exercise/Equipment Resistance/Repetitions Other comments          VORx1 object near and far Horizontal and vertical in standing  HEP   COR   Horizontal and vertical in standing HEP   Remembered targets Horizontal only HEP          Romberg on airex EO/EC 2x30\"  With head turns HC 2x10  With ball toss x20    rockerboard NV    Walking with head turns NV    VORx2 NV                                                                             Other Therapeutic Activities:     Manual Treatments:         Modalities:      Test/Measurements:   Tested previously    R Hallpike-Edwin maneuver: Nystagmus:     []? Yes             [x]? No               []? Duration:                                           []? Direction:                  Vertigo:            []? Yes             [x]? No               []? Duration:   L Hallpike-Edwin maneuver:              Nystagmus:     []? Yes             [x]? No               []? Duration:                                           []? Direction:                  Vertigo:            [x]? Yes             [x]? No               []? Duration:   Supine roll head right:              Nystagmus:     []? Yes             [x]? No               []? Duration:                                           []? Direction:                  Vertigo:            []? Yes             [x]? No               []? Duration:   Supine roll head left:              Nystagmus:     []? Yes             [x]? No               []? Duration:                                           []? Direction:                  Vertigo:            []? Yes             [x]? No               []? Duration:         ASSESSMENT: Patient able to progress gaze stabilization and balance exercises this date. Most symptomatic with vertical head movement. Progress as pt tolerates. Treatment/Activity Tolerance:   [x]Patient tolerated treatment well [] Patient limited by fatique  []Patient limited by pain [] Patient limited by other medical complications  [] Other:     GOALS: Goals established 6/7/22   Patient stated goal: self care, daily work  []? Progressing: []? Met: []? Not Met: []? Adjusted     Therapist goals for Patient:  Short Term Goals: To be achieved in: 2 weeks  1. Independent in HEP and progression per patient tolerance, in order to prevent re-injury. []? Progressing: []? Met: []? Not Met: []? Adjusted  2. Patient will have a decrease in dizziness/imbalance/symptoms to facilitate improvement in movement, function, balance, and ADLS as indicated by Functional Deficits. []? Progressing: []? Met: []?  Not Met: []? Adjusted     Long Term Goals: To be achieved in:  6 weeks  1. Improve by FOTO predictive value to assist with reaching prior level of function  []? Progressing: []? Met: []? Not Met: []? Adjusted  2. Patient will demonstrate increased cervical AROM to WNL, joint mobility WNL to allow for proper joint functioning as indicated by Functional Deficits. []? Progressing: []? Met: []? Not Met: []? Adjusted  3. Patient will demonstrate negative oculomotor special testing/positional testing as indicated by Functional Deficits. []? Progressing: []? Met: []? Not Met: []? Adjusted  4. Patient will return to functional activities including rolling over, bending over, looking up, turning around without increased symptoms or restriction. []? Progressing: []? Met: []? Not Met: []? Adjusted  5. Patient will demonstrate [facility] [place] balance on Modified CTSIB  []? Progressing: []? Met: []? Not Met: []?  Adjusted       Plan: [x] Continue per plan of care [] Alter current plan (see comments)   [] Plan of care initiated [] Hold pending MD visit [] Discharge      Plan for Next Session:  Vestibular hypofunction protocol    Re-Certification Due Date:         Signature:  Leonela Bauer PT

## 2022-06-27 ENCOUNTER — HOSPITAL ENCOUNTER (OUTPATIENT)
Dept: PHYSICAL THERAPY | Age: 47
Setting detail: THERAPIES SERIES
Discharge: HOME | End: 2022-06-27
Payer: MEDICAID

## 2022-06-27 PROCEDURE — 97112 NEUROMUSCULAR REEDUCATION: CPT

## 2022-06-27 NOTE — FLOWSHEET NOTE
[facility]  [facility] and Therapy, [facility], [address], [address]  Phone: [phone]  Fax [phone]    Physical Therapy Daily Treatment Note    Date:  2022    Patient Name:  Arianna Vila    :  1975  MRN: [id number]  Restrictions/Precautions:    Medical/Treatment Diagnosis Information:  · Diagnosis: vertigo, BPPV  Insurance/Certification information:  PT Insurance Information: Ajay Escobar  Referring Physician:   Claudia Romero MD; seeing new PCP on Thursday, Jasper Crockett CNP                             Plan of care signed (Y/N):  N  Visit# / total visits:  3/12     G-Code (if applicable): Intake        Physical FS Primary Measure 39 43  Predicted Points of Change  18  Predicted # of visits   8  Predicted Discharge FS Score 57    Medicare Cap (if applicable):  n/a = total amount used, updated 2022    Time in: 10:45       Timed Treatment: 45 Total Treatment Time:  45  Time out: 11:30  ________________________________________________________________________________________    Pain Level:    /10  SUBJECTIVE:  Patient reports that each day she notices improvement. 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 Leaving for [place] later today.      OBJECTIVE:     Exercise/Equipment Resistance/Repetitions Other comments          VORx1 object near and far Horizontal and vertical in standing  HEP   COR   Horizontal and vertical in standing HEP   Remembered targets Horizontal only HEP          Romberg on airex EO/EC 2x30\"  With head turns EC 2x10  With ball toss x20    rockerboard 1' rocking, 1' balance each direction     Walking with head turns x5 minutes    VORx2 Horizontal and vertical    checkerboard Horizontal and vertical                                                                     Other Therapeutic Activities: Manual Treatments:         Modalities:      Test/Measurements:   Tested previously    R Hallpike-Edwin maneuver:               Nystagmus:     []? Yes             [x]? No               []? Duration:                                           []? Direction:                  Vertigo:            []? Yes             [x]? No               []? Duration:   L Hallpike-Edwin maneuver:              Nystagmus:     []? Yes             [x]? No               []? Duration:                                           []? Direction:                  Vertigo:            []? Yes             [x]? No               []? Duration:   Supine roll head right:              Nystagmus:     []? Yes             [x]? No               []? Duration:                                           []? Direction:                  Vertigo:            []? Yes             [x]? No               []? Duration:   Supine roll head left:              Nystagmus:     []? Yes             [x]? No               []? Duration:                                           []? Direction:                  Vertigo:            []? Yes             [x]? No               []? Duration:         ASSESSMENT: Patient able to progress gaze stabilization and balance exercises this date. Progress as pt tolerates. Treatment/Activity Tolerance:   [x]Patient tolerated treatment well [] Patient limited by fatique  []Patient limited by pain [] Patient limited by other medical complications  [] Other:     GOALS: Goals established 6/7/22   Patient stated goal: self care, daily work  []? Progressing: []? Met: []? Not Met: []? Adjusted     Therapist goals for Patient:  Short Term Goals: To be achieved in: 2 weeks  1. Independent in HEP and progression per patient tolerance, in order to prevent re-injury. []? Progressing: []? Met: []? Not Met: []? Adjusted  2.  Patient will have a decrease in dizziness/imbalance/symptoms to facilitate improvement in movement, function, balance, and ADLS as indicated by Functional Deficits. []? Progressing: []? Met: []? Not Met: []? Adjusted     Long Term Goals: To be achieved in:  6 weeks  1. Improve by FOTO predictive value to assist with reaching prior level of function  []? Progressing: []? Met: []? Not Met: []? Adjusted  2. Patient will demonstrate increased cervical AROM to WNL, joint mobility WNL to allow for proper joint functioning as indicated by Functional Deficits. []? Progressing: []? Met: []? Not Met: []? Adjusted  3. Patient will demonstrate negative oculomotor special testing/positional testing as indicated by Functional Deficits. []? Progressing: []? Met: []? Not Met: []? Adjusted  4. Patient will return to functional activities including rolling over, bending over, looking up, turning around without increased symptoms or restriction. []? Progressing: []? Met: []? Not Met: []? Adjusted  5. Patient will demonstrate [facility] [place] balance on Modified CTSIB  []? Progressing: []? Met: []? Not Met: []?  Adjusted       Plan: [x] Continue per plan of care [] Alter current plan (see comments)   [] Plan of care initiated [] Hold pending MD visit [] Discharge      Plan for Next Session:  Vestibular hypofunction protocol    Re-Certification Due Date:         Signature:  Layla Petersen PT

## 2022-06-29 ENCOUNTER — APPOINTMENT (OUTPATIENT)
Dept: PHYSICAL THERAPY | Age: 47
End: 2022-06-29
Payer: MEDICAID

## 2022-07-06 ENCOUNTER — APPOINTMENT (OUTPATIENT)
Dept: PHYSICAL THERAPY | Age: 47
End: 2022-07-06
Payer: MEDICAID

## 2022-07-08 ENCOUNTER — APPOINTMENT (OUTPATIENT)
Dept: PHYSICAL THERAPY | Age: 47
End: 2022-07-08
Payer: MEDICAID

## 2022-07-11 ENCOUNTER — APPOINTMENT (OUTPATIENT)
Dept: PHYSICAL THERAPY | Age: 47
End: 2022-07-11
Payer: MEDICAID

## 2022-07-15 ENCOUNTER — APPOINTMENT (OUTPATIENT)
Dept: PHYSICAL THERAPY | Age: 47
End: 2022-07-15
Payer: MEDICAID

## 2022-07-18 ENCOUNTER — HOSPITAL ENCOUNTER (OUTPATIENT)
Dept: PHYSICAL THERAPY | Age: 47
Setting detail: THERAPIES SERIES
Discharge: HOME | End: 2022-07-18
Payer: MEDICAID

## 2022-07-18 PROCEDURE — 97112 NEUROMUSCULAR REEDUCATION: CPT

## 2022-07-18 NOTE — FLOWSHEET NOTE
Mayra  80[address] and Therapy, [facility], [address], [address]  Phone: [phone]  Fax [phone]    Physical Therapy Daily Treatment Note    Date:  2022    Patient Name:  Sam Perkins    :  1975  MRN: [id number]  Restrictions/Precautions:    Medical/Treatment Diagnosis Information:  Diagnosis: vertigo, BPPV  Insurance/Certification information:  PT Insurance Information: Freda Alvarez  Referring Physician:   Elvis Shaw MD; seeing new PCP on Thursday, Fina Barriga CNP                             Plan of care signed (Y/N):  N  Visit# / total visits:       G-Code (if applicable): Intake        Physical FS Primary Measure 39 43 52  Predicted Points of Change  18  Predicted # of visits   8  Predicted Discharge FS Score 57    Medicare Cap (if applicable):  n/a = total amount used, updated 2022    Time in: 10:45       Timed Treatment: 45  Total Treatment Time:  45  Time out: 11:30  ________________________________________________________________________________________    Pain Level:    /10  SUBJECTIVE:  Patient reports that her vertigo is much better since last visit. Can still get dizzy with up and down head movement, but overall feeling about 80% back to normal. Able to do pretty much everything, but slower than usual. Is now back to driving.      OBJECTIVE:     Exercise/Equipment Resistance/Repetitions Other comments          VORx1 object near and far Horizontal and vertical in standing  HEP   COR   Horizontal and vertical in standing HEP   Remembered targets Horizontal only HEP          Romberg on airex EO/EC 2x30\"  With head turns EC 2x10  With ball toss x20    rockerboard 1' rocking, 1' balance each direction     Walking with head turns x5 minutes    VORx2 Horizontal and vertical    checkerboard Horizontal and vertical                                                                     Other Therapeutic Activities:   Manual Treatments:         Modalities:      Test/Measurements:       Postural Control Tests:  Clinical Test of Sensory Interaction for Balance (CTSIB) performed in Romberg stance  CONDITION TIME STRATEGY SWAY    Eyes open, firm surface 30 ankle min    Eyes closed, firm surface 30 ankle Min (Mod-max at eval)    Eyes open, foam surface 30 ankle Min (mod at eval)    Eyes closed, foam surface 30 (0 at eval) Ankle (stepping at eval) Min-mod (LOB at eval)      Tandem stance: WNL (DNT at eval)     Gait:              Assistive Device: N                   Orthosis: N              At self-initiated pace:  Maria Luisa: WNL (slowed at eval) KATHRYN: wide                                                                     Arm swing: Y (no at eval)  Head/trunk rotation: no                                                  Straight path: Y (no at eval)              Swerves: N (yes at eval)                                                  Staggers: N (yes at eval)   Side-steps: N (yes at eval)              Gait speed: slowed     Oculomotor/Vestibular Examination:     Spontaneous nystagmus:       [] Left              [] Right           [x] Absent  Gaze-Evoked nystagmus with fixation present:              Primary            [] Present       [x] Absent              Right                [] Present       [x] Absent              Left                  [] Present       [x] Absent  VOR Head Thrust Test:          [] Normal        [x] Abnormal    Comments: delayed to L - continued from eval  VOR Cancellation:                  [x] Normal        [] Abnormal    Comments:  Smooth Pursuit:                      [x] Normal        [] Abnormal    Comments:  Saccades:                               [x] Normal        [] Abnormal    Comments:  Convergence:                          [x] Normal        [] Abnormal    Comments:     R Hallpike-[place] maneuver:               Nystagmus:     [] Yes             [x] No               [] Duration: [] Direction:                  Vertigo:            [] Yes             [x] No               [] Duration:   L Hallpike-[place] maneuver:              Nystagmus:     [] Yes             [x] No               [] Duration:                                           [] Direction:                  Vertigo:            [] Yes             [x] No               [] Duration:   Supine roll head right:              Nystagmus:     [] Yes             [x] No               [] Duration:                                           [] Direction:                  Vertigo:            [] Yes             [x] No               [] Duration:   Supine roll head left:              Nystagmus:     [] Yes             [x] No               [] Duration:                                           [] Direction:                  Vertigo:            [] Yes             [x] No               [] Duration:         ASSESSMENT: After a 3+ week break from PT for patient traveling, patient is demonstrating significant improvement in balance, gaze stabilization, and return to normalized function. She is independent with home exercise program and recommend pt to continue home exercises with PRN visits in the future. She continues to demonstrate delayed VOR. Progress as pt tolerates. Treatment/Activity Tolerance:   [x]Patient tolerated treatment well [] Patient limited by fatique  []Patient limited by pain [] Patient limited by other medical complications  [] Other:     GOALS: Goals established 6/7/22   Patient stated goal: self care, daily work  [] Progressing: [x] Met: [] Not Met: [] Adjusted     Therapist goals for Patient:  Short Term Goals: To be achieved in: 2 weeks  Independent in HEP and progression per patient tolerance, in order to prevent re-injury.    [] Progressing: [x] Met: [] Not Met: [] Adjusted  Patient will have a decrease in dizziness/imbalance/symptoms to facilitate improvement in movement, function, balance, and ADLS as indicated by

## 2022-07-21 ENCOUNTER — OFFICE VISIT (OUTPATIENT)
Dept: FAMILY MEDICINE CLINIC | Age: 47
End: 2022-07-21
Payer: MEDICAID

## 2022-07-21 VITALS
BODY MASS INDEX: 26.63 KG/M2 | WEIGHT: 165 LBS | HEART RATE: 93 BPM | DIASTOLIC BLOOD PRESSURE: 70 MMHG | OXYGEN SATURATION: 97 % | SYSTOLIC BLOOD PRESSURE: 110 MMHG

## 2022-07-21 DIAGNOSIS — E78.00 HIGH CHOLESTEROL: ICD-10-CM

## 2022-07-21 DIAGNOSIS — M79.10 MYALGIA: ICD-10-CM

## 2022-07-21 DIAGNOSIS — R79.89 ELEVATED TSH: Primary | ICD-10-CM

## 2022-07-21 PROCEDURE — 99214 OFFICE O/P EST MOD 30 MIN: CPT | Performed by: NURSE PRACTITIONER

## 2022-07-21 PROCEDURE — G8427 DOCREV CUR MEDS BY ELIG CLIN: HCPCS | Performed by: NURSE PRACTITIONER

## 2022-07-21 PROCEDURE — 36415 COLL VENOUS BLD VENIPUNCTURE: CPT | Performed by: NURSE PRACTITIONER

## 2022-07-21 PROCEDURE — G8419 CALC BMI OUT NRM PARAM NOF/U: HCPCS | Performed by: NURSE PRACTITIONER

## 2022-07-21 PROCEDURE — 1036F TOBACCO NON-USER: CPT | Performed by: NURSE PRACTITIONER

## 2022-07-21 RX ORDER — FLUTICASONE PROPIONATE 50 MCG
2 SPRAY, SUSPENSION (ML) NASAL DAILY
Qty: 16 G | Refills: 3 | Status: SHIPPED | OUTPATIENT
Start: 2022-07-21

## 2022-07-21 RX ORDER — ATORVASTATIN CALCIUM 40 MG/1
40 TABLET, FILM COATED ORAL NIGHTLY
Qty: 30 TABLET | Refills: 3 | Status: SHIPPED | OUTPATIENT
Start: 2022-07-21 | End: 2022-07-26 | Stop reason: SDUPTHER

## 2022-07-21 ASSESSMENT — PATIENT HEALTH QUESTIONNAIRE - PHQ9
6. FEELING BAD ABOUT YOURSELF - OR THAT YOU ARE A FAILURE OR HAVE LET YOURSELF OR YOUR FAMILY DOWN: 0
SUM OF ALL RESPONSES TO PHQ QUESTIONS 1-9: 0
4. FEELING TIRED OR HAVING LITTLE ENERGY: 0
SUM OF ALL RESPONSES TO PHQ QUESTIONS 1-9: 0
SUM OF ALL RESPONSES TO PHQ QUESTIONS 1-9: 0
8. MOVING OR SPEAKING SO SLOWLY THAT OTHER PEOPLE COULD HAVE NOTICED. OR THE OPPOSITE, BEING SO FIGETY OR RESTLESS THAT YOU HAVE BEEN MOVING AROUND A LOT MORE THAN USUAL: 0
3. TROUBLE FALLING OR STAYING ASLEEP: 0
9. THOUGHTS THAT YOU WOULD BE BETTER OFF DEAD, OR OF HURTING YOURSELF: 0
7. TROUBLE CONCENTRATING ON THINGS, SUCH AS READING THE NEWSPAPER OR WATCHING TELEVISION: 0
2. FEELING DOWN, DEPRESSED OR HOPELESS: 0
SUM OF ALL RESPONSES TO PHQ QUESTIONS 1-9: 0
5. POOR APPETITE OR OVEREATING: 0
10. IF YOU CHECKED OFF ANY PROBLEMS, HOW DIFFICULT HAVE THESE PROBLEMS MADE IT FOR YOU TO DO YOUR WORK, TAKE CARE OF THINGS AT HOME, OR GET ALONG WITH OTHER PEOPLE: 0
1. LITTLE INTEREST OR PLEASURE IN DOING THINGS: 0
SUM OF ALL RESPONSES TO PHQ9 QUESTIONS 1 & 2: 0

## 2022-07-21 ASSESSMENT — ENCOUNTER SYMPTOMS
SHORTNESS OF BREATH: 0
WHEEZING: 0

## 2022-07-21 NOTE — PROGRESS NOTES
Patient: Marcelle Hathaway is a 40 y.o. female who presents today with the following Chief Complaint(s):  Chief Complaint   Patient presents with    Follow-up     Discuss joint pain          HPI  Here for follow up on thyroid: TSH was a little elevated last check- will recheck labs today. Also c/o all over joint pains- thinks it may be related to lipitor- will see what her cholesterol is today- may need to change cholesterol meds     High cholesterol : will check labs today- has been taking lipitor 40mg daily- having some myalgias     Current Outpatient Medications   Medication Sig Dispense Refill    fluticasone (FLONASE) 50 MCG/ACT nasal spray 2 sprays by Nasal route in the morning. 16 g 3    atorvastatin (LIPITOR) 40 MG tablet Take 1 tablet by mouth nightly 30 tablet 3    meclizine (ANTIVERT) 25 MG tablet Take 25 mg by mouth 3 times daily as needed      levothyroxine (SYNTHROID) 88 MCG tablet Take 1 tablet by mouth Daily 30 tablet 3    ondansetron (ZOFRAN-ODT) 4 MG disintegrating tablet Take 1 tablet by mouth every 8 hours as needed for Nausea or Vomiting 20 tablet 0    diclofenac sodium (VOLTAREN) 1 % GEL Apply topically 2 times daily (Patient not taking: No sig reported) 150 g 1     No current facility-administered medications for this visit. Patient's past medical history, surgical history, family history, medications,  andallergies  were all reviewed and updated as appropriate today. Review of Systems   Constitutional:  Negative for chills and fever. Respiratory:  Negative for shortness of breath and wheezing. Cardiovascular:  Negative for chest pain and palpitations. Musculoskeletal:  Positive for myalgias. Physical Exam  Vitals and nursing note reviewed. Constitutional:       Appearance: Normal appearance. She is well-developed. HENT:      Head: Normocephalic and atraumatic.       Right Ear: External ear normal.      Left Ear: External ear normal.      Nose: Nose normal. Mouth/Throat:      Pharynx: No oropharyngeal exudate or posterior oropharyngeal erythema. Eyes:      Conjunctiva/sclera: Conjunctivae normal.   Cardiovascular:      Rate and Rhythm: Normal rate and regular rhythm. Heart sounds: Normal heart sounds. No murmur heard. Pulmonary:      Effort: Pulmonary effort is normal. No respiratory distress. Breath sounds: Normal breath sounds. No wheezing or rales. Musculoskeletal:         General: Normal range of motion. Cervical back: Normal range of motion and neck supple. Lymphadenopathy:      Cervical: No cervical adenopathy. Skin:     General: Skin is warm and dry. Neurological:      General: No focal deficit present. Mental Status: She is alert and oriented to person, place, and time. Deep Tendon Reflexes: Reflexes are normal and symmetric. Psychiatric:         Mood and Affect: Mood normal.         Behavior: Behavior normal.         Thought Content: Thought content normal.         Judgment: Judgment normal.     Vitals:    07/21/22 1613   BP: 110/70   Pulse:    SpO2:        Assessment:  Encounter Diagnoses   Name Primary? High cholesterol     Elevated TSH Yes    Myalgia        Plan:  1. High cholesterol  Continue lipitor 40mg for now- discussed we will see what her labs show- may need to change her med due to her myalgias   - Comprehensive Metabolic Panel  - Lipid Panel    2. Elevated TSH  Continue current dose of synthroid for now- may need to adjust dose based on lab results  - TSH  - T4, Free      No follow-ups on file.

## 2022-07-22 ENCOUNTER — APPOINTMENT (OUTPATIENT)
Dept: PHYSICAL THERAPY | Age: 47
End: 2022-07-22
Payer: MEDICAID

## 2022-07-22 LAB
A/G RATIO: 2 (ref 1.1–2.2)
ALBUMIN SERPL-MCNC: 4.7 G/DL (ref 3.4–5)
ALP BLD-CCNC: 77 U/L (ref 40–129)
ALT SERPL-CCNC: 22 U/L (ref 10–40)
ANION GAP SERPL CALCULATED.3IONS-SCNC: 14 MMOL/L (ref 3–16)
AST SERPL-CCNC: 13 U/L (ref 15–37)
BILIRUB SERPL-MCNC: 0.4 MG/DL (ref 0–1)
BUN BLDV-MCNC: 11 MG/DL (ref 7–20)
CALCIUM SERPL-MCNC: 9.9 MG/DL (ref 8.3–10.6)
CHLORIDE BLD-SCNC: 102 MMOL/L (ref 99–110)
CHOLESTEROL, TOTAL: 161 MG/DL (ref 0–199)
CO2: 23 MMOL/L (ref 21–32)
CREAT SERPL-MCNC: 0.7 MG/DL (ref 0.6–1.1)
GFR AFRICAN AMERICAN: >60
GFR NON-AFRICAN AMERICAN: >60
GLUCOSE BLD-MCNC: 95 MG/DL (ref 70–99)
HDLC SERPL-MCNC: 41 MG/DL (ref 40–60)
LDL CHOLESTEROL CALCULATED: 92 MG/DL
POTASSIUM SERPL-SCNC: 4 MMOL/L (ref 3.5–5.1)
SODIUM BLD-SCNC: 139 MMOL/L (ref 136–145)
T4 FREE: 1.3 NG/DL (ref 0.9–1.8)
TOTAL PROTEIN: 7.1 G/DL (ref 6.4–8.2)
TRIGL SERPL-MCNC: 142 MG/DL (ref 0–150)
TSH SERPL DL<=0.05 MIU/L-ACNC: 5.54 UIU/ML (ref 0.27–4.2)
VLDLC SERPL CALC-MCNC: 28 MG/DL

## 2022-07-25 ENCOUNTER — APPOINTMENT (OUTPATIENT)
Dept: PHYSICAL THERAPY | Age: 47
End: 2022-07-25
Payer: MEDICAID

## 2022-07-26 ENCOUNTER — TELEPHONE (OUTPATIENT)
Dept: FAMILY MEDICINE CLINIC | Age: 47
End: 2022-07-26

## 2022-07-26 RX ORDER — ATORVASTATIN CALCIUM 20 MG/1
20 TABLET, FILM COATED ORAL NIGHTLY
Qty: 30 TABLET | Refills: 5 | Status: SHIPPED | OUTPATIENT
Start: 2022-07-26 | End: 2022-07-26 | Stop reason: SDUPTHER

## 2022-07-26 RX ORDER — ATORVASTATIN CALCIUM 20 MG/1
20 TABLET, FILM COATED ORAL NIGHTLY
Qty: 30 TABLET | Refills: 5 | Status: SHIPPED | OUTPATIENT
Start: 2022-07-26 | End: 2022-08-25

## 2022-07-26 NOTE — TELEPHONE ENCOUNTER
Pt states she would like to decrease meds first and then see what happens.  Pt would like to be notified when sent

## 2022-07-26 NOTE — TELEPHONE ENCOUNTER
Patient is requesting a return call to discuss her labs and her atorvastatin. Patient feels she is taking too much medication. Please call to discuss. Lab results and message from NB was given to patient but patient wants to discuss further.

## 2022-07-26 NOTE — TELEPHONE ENCOUNTER
Her cholesterol medication is working- does she want to decrease it or change it to a different statin to see if it helps the muscle aches?

## 2022-07-29 ENCOUNTER — APPOINTMENT (OUTPATIENT)
Dept: PHYSICAL THERAPY | Age: 47
End: 2022-07-29
Payer: MEDICAID

## 2022-08-08 ENCOUNTER — HOSPITAL ENCOUNTER (OUTPATIENT)
Dept: PHYSICAL THERAPY | Age: 47
Setting detail: THERAPIES SERIES
Discharge: HOME | End: 2022-08-08
Payer: MEDICAID

## 2022-08-08 PROCEDURE — 97112 NEUROMUSCULAR REEDUCATION: CPT

## 2022-08-08 NOTE — PROGRESS NOTES
[facility], [facility], [address], [address]  Phone: [phone]  Fax [phone]    Dear Referring Practitioner: Breonna Bowles CNP,     We had the pleasure of evaluating the following patient for physical therapy services at [facility]. A summary of our findings can be found in the initial assessment below. This includes our plan of care. If you have any questions or concerns regarding these findings, please do not hesitate to contact me at the office phone number. Thank you for the referral.       Physician Signature:_______________________________Date:__________________  By signing above (or electronic signature), therapists plan is approved by physician    Physical Therapy Daily Treatment/Progress Note    Date:  2022    Patient Name:  Lauren Hall    :  1975  MRN: [id number]  Restrictions/Precautions:    Medical/Treatment Diagnosis Information:  Diagnosis: vertigo, BPPV  Insurance/Certification information:  PT Insurance Information: Laurita Muñiz  Referring Physician:   Luis F Vaca MD; seeing new PCP on Thursday, Breonna Bowles CNP                             Plan of care signed (Y/N):  N  Visit# / total visits:       G-Code (if applicable): Intake        Physical FS Primary Measure 39 43 52 61  Predicted Points of Change  18  Predicted # of visits   8  Predicted Discharge FS Score 57    Medicare Cap (if applicable):  n/a = total amount used, updated 2022    Time in: 10:45       Timed Treatment: 25  Total Treatment Time:  25  Time out: 11:10  ________________________________________________________________________________________    Pain Level:    /10  SUBJECTIVE:  Patient reports that her vertigo is much better since last visit.  Can still get dizzy with up and down head movement, but overall feeling about 90% back to normal. Mostly notices increase in symptoms with high emotions, like stress or sadness.      OBJECTIVE:     Exercise/Equipment Resistance/Repetitions Other comments          VORx1 object near and far Horizontal and vertical in standing  HEP   COR   Horizontal and vertical in standing HEP   Remembered targets Horizontal only HEP          Romberg on airex EO/EC 2x30\"  With head turns EC 2x10  With ball toss x20    rockerboard 1' rocking, 1' balance each direction     Walking with head turns x5 minutes    VORx2 Horizontal and vertical    checkerboard Horizontal and vertical                                                                     Other Therapeutic Activities:   Manual Treatments:         Modalities:      Test/Measurements:       Postural Control Tests:  Clinical Test of Sensory Interaction for Balance (CTSIB) performed in Romberg stance  CONDITION TIME STRATEGY SWAY    Eyes open, firm surface 30 ankle min    Eyes closed, firm surface 30 ankle Min (Mod-max at eval)    Eyes open, foam surface 30 ankle Min (mod at eval)    Eyes closed, foam surface 30 (0 at eval) Ankle (stepping at eval) Min-mod (LOB at eval)      Tandem stance: WNL (DNT at eval)     Gait:              Assistive Device: N                   Orthosis: N              At self-initiated pace:  Maria Luisa: WNL (slowed at eval) KATHRYN: wide                                                                     Arm swing: Y (no at eval)  Head/trunk rotation: no                                                  Straight path: Y (no at eval)              Swerves: N (yes at eval)                                                  Staggers: N (yes at eval)   Side-steps: N (yes at eval)              Gait speed: slowed     Oculomotor/Vestibular Examination:     Spontaneous nystagmus:       [] Left              [] Right           [x] Absent  Gaze-Evoked nystagmus with fixation present:              Primary            [] Present       [x] Absent              Right                [] Present       [x] Absent              Left                  [] Present       [x] Absent  VOR Head Thrust Test:          [] Normal        [x] Abnormal    Comments: delayed to L 50% of the time  VOR Cancellation:                  [x] Normal        [] Abnormal    Comments:  Smooth Pursuit:                      [x] Normal        [] Abnormal    Comments:  Saccades:                               [x] Normal        [] Abnormal    Comments:  Convergence:                          [x] Normal        [] Abnormal    Comments:     R Hallpike-Edwin maneuver:               Nystagmus:     [] Yes             [x] No               [] Duration:                                           [] Direction:                  Vertigo:            [] Yes             [x] No               [] Duration:   L Hallpike-[place] maneuver:              Nystagmus:     [] Yes             [x] No               [] Duration:                                           [] Direction:                  Vertigo:            [] Yes             [x] No               [] Duration:   Supine roll head right:              Nystagmus:     [] Yes             [x] No               [] Duration:                                           [] Direction:                  Vertigo:            [] Yes             [x] No               [] Duration:   Supine roll head left:              Nystagmus:     [] Yes             [x] No               [] Duration:                                           [] Direction:                  Vertigo:            [] Yes             [x] No               [] Duration:         ASSESSMENT: After a 3+ week break from PT, patient is demonstrating significant improvement in balance, gaze stabilization, and return to normalized function. She is independent with home exercise program and recommend pt to continue home exercises. Her VOR is improved about 50% of the repeitions on head impulse test, indicating improved compensation. D/c this date and patient agreeable.      Treatment/Activity Tolerance: [x]Patient tolerated treatment well [] Patient limited by fatique  []Patient limited by pain [] Patient limited by other medical complications  [] Other:     GOALS: Goals established 6/7/22   Patient stated goal: self care, daily work  [] Progressing: [x] Met: [] Not Met: [] Adjusted     Therapist goals for Patient:  Short Term Goals: To be achieved in: 2 weeks  Independent in HEP and progression per patient tolerance, in order to prevent re-injury. [] Progressing: [x] Met: [] Not Met: [] Adjusted  Patient will have a decrease in dizziness/imbalance/symptoms to facilitate improvement in movement, function, balance, and ADLS as indicated by Functional Deficits. [] Progressing: [x] Met: [] Not Met: [] Adjusted     Long Term Goals: To be achieved in:  6 weeks  Improve by FOTO predictive value to assist with reaching prior level of function  [] Progressing: [x] Met: [] Not Met: [] Adjusted  Patient will demonstrate increased cervical AROM to WNL, joint mobility WNL to allow for proper joint functioning as indicated by Functional Deficits. [] Progressing: [x] Met: [] Not Met: [] Adjusted  Patient will demonstrate negative oculomotor special testing/positional testing as indicated by Functional Deficits. [] Progressing: [x] Met: [] Not Met: [] Adjusted  Patient will return to functional activities including rolling over, bending over, looking up, turning around without increased symptoms or restriction.   [] Progressing: [x] Met: [] Not Met: [] Adjusted  Patient will demonstrate WFL balance on Modified CTSIB  [] Progressing: [x] Met: [] Not Met: [] Adjusted       Plan: [] Continue per plan of care [] Alter current plan (see comments)   [] Plan of care initiated [] Hold pending MD visit [x] Discharge      Plan for Next Session:  Vestibular hypofunction protocol    Re-Certification Due Date:         Signature:  Asya Gastelum PT

## 2022-10-30 DIAGNOSIS — E03.9 HYPOTHYROIDISM, UNSPECIFIED TYPE: ICD-10-CM

## 2022-10-31 RX ORDER — LEVOTHYROXINE SODIUM 88 UG/1
TABLET ORAL
Qty: 30 TABLET | Refills: 3 | Status: SHIPPED | OUTPATIENT
Start: 2022-10-31 | End: 2022-11-21 | Stop reason: SDUPTHER

## 2022-11-21 ENCOUNTER — OFFICE VISIT (OUTPATIENT)
Dept: FAMILY MEDICINE CLINIC | Age: 47
End: 2022-11-21
Payer: MEDICAID

## 2022-11-21 VITALS
HEART RATE: 90 BPM | WEIGHT: 160 LBS | DIASTOLIC BLOOD PRESSURE: 60 MMHG | OXYGEN SATURATION: 98 % | BODY MASS INDEX: 25.82 KG/M2 | SYSTOLIC BLOOD PRESSURE: 104 MMHG

## 2022-11-21 DIAGNOSIS — E78.00 HIGH CHOLESTEROL: ICD-10-CM

## 2022-11-21 DIAGNOSIS — E03.9 HYPOTHYROIDISM, UNSPECIFIED TYPE: Primary | ICD-10-CM

## 2022-11-21 DIAGNOSIS — J30.9 ALLERGIC RHINITIS, UNSPECIFIED SEASONALITY, UNSPECIFIED TRIGGER: ICD-10-CM

## 2022-11-21 PROCEDURE — 1036F TOBACCO NON-USER: CPT | Performed by: NURSE PRACTITIONER

## 2022-11-21 PROCEDURE — G8419 CALC BMI OUT NRM PARAM NOF/U: HCPCS | Performed by: NURSE PRACTITIONER

## 2022-11-21 PROCEDURE — 99214 OFFICE O/P EST MOD 30 MIN: CPT | Performed by: NURSE PRACTITIONER

## 2022-11-21 PROCEDURE — 36415 COLL VENOUS BLD VENIPUNCTURE: CPT | Performed by: NURSE PRACTITIONER

## 2022-11-21 PROCEDURE — G8427 DOCREV CUR MEDS BY ELIG CLIN: HCPCS | Performed by: NURSE PRACTITIONER

## 2022-11-21 PROCEDURE — G8484 FLU IMMUNIZE NO ADMIN: HCPCS | Performed by: NURSE PRACTITIONER

## 2022-11-21 RX ORDER — FLUTICASONE PROPIONATE 50 MCG
2 SPRAY, SUSPENSION (ML) NASAL DAILY
Qty: 16 G | Refills: 3 | Status: SHIPPED | OUTPATIENT
Start: 2022-11-21

## 2022-11-21 RX ORDER — LEVOTHYROXINE SODIUM 88 UG/1
TABLET ORAL
Qty: 30 TABLET | Refills: 3 | Status: SHIPPED | OUTPATIENT
Start: 2022-11-21 | End: 2022-11-22 | Stop reason: SDUPTHER

## 2022-11-21 RX ORDER — ATORVASTATIN CALCIUM 20 MG/1
20 TABLET, FILM COATED ORAL NIGHTLY
Qty: 30 TABLET | Refills: 5 | Status: SHIPPED | OUTPATIENT
Start: 2022-11-21 | End: 2022-12-21

## 2022-11-21 ASSESSMENT — ENCOUNTER SYMPTOMS
WHEEZING: 0
SHORTNESS OF BREATH: 0

## 2022-11-21 NOTE — PROGRESS NOTES
Patient: Rosalba Ventura is a 56 y.o. female who presents today with the following Chief Complaint(s):  Chief Complaint   Patient presents with    Other     Discuss thyroid          HPI  Here for follow up on hypothyroid- been taking 88mcg synthroid daily- has been feeling more tired lately- will check labs again. High cholesterol: tolerating lipitor well- no complaints  Allergies- needs refill on flonase     Current Outpatient Medications   Medication Sig Dispense Refill    atorvastatin (LIPITOR) 20 MG tablet Take 1 tablet by mouth nightly Take 1 tablet by mouth nightly 30 tablet 5    fluticasone (FLONASE) 50 MCG/ACT nasal spray 2 sprays by Nasal route daily 16 g 3    levothyroxine (SYNTHROID) 88 MCG tablet TAKE ONE TABLET BY MOUTH DAILY 30 tablet 3    diclofenac sodium (VOLTAREN) 1 % GEL Apply topically 2 times daily (Patient not taking: No sig reported) 150 g 1     No current facility-administered medications for this visit. Patient's past medical history, surgical history, family history, medications,  andallergies  were all reviewed and updated as appropriate today. Review of Systems   Constitutional:  Positive for fatigue. HENT:  Positive for congestion (needs refill on flonase). Respiratory:  Negative for shortness of breath and wheezing. Cardiovascular:  Negative for chest pain and palpitations. Physical Exam  Vitals and nursing note reviewed. Constitutional:       Appearance: Normal appearance. She is well-developed. HENT:      Head: Normocephalic and atraumatic. Right Ear: External ear normal.      Left Ear: External ear normal.      Nose: Nose normal.      Mouth/Throat:      Pharynx: No oropharyngeal exudate or posterior oropharyngeal erythema. Eyes:      Conjunctiva/sclera: Conjunctivae normal.   Cardiovascular:      Rate and Rhythm: Normal rate and regular rhythm. Heart sounds: Normal heart sounds. No murmur heard.   Pulmonary:      Effort: Pulmonary effort is normal. No respiratory distress. Breath sounds: Normal breath sounds. No wheezing or rales. Musculoskeletal:         General: Normal range of motion. Cervical back: Normal range of motion and neck supple. Lymphadenopathy:      Cervical: No cervical adenopathy. Skin:     General: Skin is warm and dry. Neurological:      General: No focal deficit present. Mental Status: She is alert and oriented to person, place, and time. Deep Tendon Reflexes: Reflexes are normal and symmetric. Psychiatric:         Mood and Affect: Mood normal.         Behavior: Behavior normal.         Thought Content: Thought content normal.         Judgment: Judgment normal.     Vitals:    11/21/22 1648   BP: 104/60   Pulse: 90   SpO2: 98%       Assessment:  Encounter Diagnoses   Name Primary? Hypothyroidism, unspecified type Yes    High cholesterol     Allergic rhinitis, unspecified seasonality, unspecified trigger        Plan:  1. Hypothyroidism, unspecified type  Stable- last labs TSH was just a little elevated so we kept the 88mcg dose- may need to adjust dose based on labs   - levothyroxine (SYNTHROID) 88 MCG tablet; TAKE ONE TABLET BY MOUTH DAILY  Dispense: 30 tablet; Refill: 3  - TSH  - T4, Free    2. High cholesterol  Stable- continue current dose  - atorvastatin (LIPITOR) 20 MG tablet; Take 1 tablet by mouth nightly Take 1 tablet by mouth nightly  Dispense: 30 tablet; Refill: 5    3. Allergic rhinitis, unspecified seasonality, unspecified trigger  Stable- continue flonase   - fluticasone (FLONASE) 50 MCG/ACT nasal spray; 2 sprays by Nasal route daily  Dispense: 16 g; Refill: 3        No follow-ups on file.

## 2022-11-22 DIAGNOSIS — E03.9 HYPOTHYROIDISM, UNSPECIFIED TYPE: ICD-10-CM

## 2022-11-22 LAB
T4 FREE: 1.5 NG/DL (ref 0.9–1.8)
TSH SERPL DL<=0.05 MIU/L-ACNC: 6.07 UIU/ML (ref 0.27–4.2)

## 2022-11-22 RX ORDER — LEVOTHYROXINE SODIUM 0.1 MG/1
TABLET ORAL
Qty: 90 TABLET | Refills: 2 | Status: SHIPPED | OUTPATIENT
Start: 2022-11-22

## 2023-01-10 ENCOUNTER — TELEPHONE (OUTPATIENT)
Dept: FAMILY MEDICINE CLINIC | Age: 48
End: 2023-01-10

## 2023-01-10 DIAGNOSIS — M41.9 SCOLIOSIS, UNSPECIFIED SCOLIOSIS TYPE, UNSPECIFIED SPINAL REGION: ICD-10-CM

## 2023-01-10 DIAGNOSIS — R20.2 NUMBNESS AND TINGLING OF BOTH UPPER EXTREMITIES: Primary | ICD-10-CM

## 2023-01-10 DIAGNOSIS — R20.0 NUMBNESS AND TINGLING OF BOTH UPPER EXTREMITIES: Primary | ICD-10-CM

## 2023-01-10 NOTE — TELEPHONE ENCOUNTER
----- Message from Dominique Roth sent at 1/10/2023  9:48 AM EST -----  Subject: Referral Request    Reason for referral request? Pt would like a referral for an orthopedic   specialist due to scoliosis and arthritis pain and numbness during sleep   in both arms and hands. Provider patient wants to be referred to(if known):     Provider Phone Number(if known):     Additional Information for Provider?   ---------------------------------------------------------------------------  --------------  [id number] [employer]    [phone]; OK to leave message on voicemail  ---------------------------------------------------------------------------  --------------

## 2023-01-18 ENCOUNTER — OFFICE VISIT (OUTPATIENT)
Dept: ORTHOPEDIC SURGERY | Age: 48
End: 2023-01-18

## 2023-01-18 VITALS — HEIGHT: 66 IN | BODY MASS INDEX: 25.71 KG/M2 | WEIGHT: 160 LBS

## 2023-01-18 DIAGNOSIS — M51.36 DDD (DEGENERATIVE DISC DISEASE), LUMBAR: ICD-10-CM

## 2023-01-18 DIAGNOSIS — M41.9 SCOLIOSIS OF THORACIC SPINE, UNSPECIFIED SCOLIOSIS TYPE: ICD-10-CM

## 2023-01-18 DIAGNOSIS — M54.2 NECK PAIN: ICD-10-CM

## 2023-01-18 DIAGNOSIS — M43.02 CERVICAL SPONDYLOLYSIS: Primary | ICD-10-CM

## 2023-01-18 NOTE — PROGRESS NOTES
New Patient: CERVICAL SPINE    Referring Provider:  CLARE Desai*    CHIEF COMPLAINT:    Chief Complaint   Patient presents with    Neck Pain     Neck pain with Bilateral numbness in arms and Right arm tingling to fingers. HISTORY OF PRESENT ILLNESS:   Ms. Leanna Spangler is a pleasant 56 y.o. old female female kindly referred by Kalina SPARKS for the evaluation of neck pain, bilateral arm pain and low back pain. Her cervical symptoms began insidiously about a month ago. Those have increased since that time she rates her neck pain and arm pain 8/10. She reports 7/10 low back pain. She notes numbness in her arms. She has had low back pain for the last 20 years and has been diagnosed with scoliosis. She denies additional upper extremity symptoms, loss of fine motor control and gait abnormality. Current/Past Treatment:   Physical Therapy: No  Chiropractic: Yes  Injection: No  Medications: Voltaren gel    Past Medical History:   Past Medical History:   Diagnosis Date    HTN (hypertension)     Hypothyroidism       Past Surgical History:     No past surgical history on file. Current Medications:     Current Outpatient Medications:     levothyroxine (SYNTHROID) 100 MCG tablet, TAKE ONE TABLET BY MOUTH DAILY, Disp: 90 tablet, Rfl: 2    atorvastatin (LIPITOR) 20 MG tablet, Take 1 tablet by mouth nightly Take 1 tablet by mouth nightly, Disp: 30 tablet, Rfl: 5    fluticasone (FLONASE) 50 MCG/ACT nasal spray, 2 sprays by Nasal route daily, Disp: 16 g, Rfl: 3    diclofenac sodium (VOLTAREN) 1 % GEL, Apply topically 2 times daily (Patient not taking: No sig reported), Disp: 150 g, Rfl: 1  Allergies:  Amoxicillin-pot clavulanate and Iodine  Social History:    reports that she has never smoked. She has never used smokeless tobacco. She reports current alcohol use. She reports that she does not use drugs. Family History:   No family history on file.     REVIEW OF SYSTEMS: Full ROS noted & scanned   CONSTITUTIONAL: Denies unexplained weight loss, fevers, chills or fatigue  NEUROLOGICAL: Denies unsteady gait or progressive weakness  MUSCULOSKELETAL: Denies joint swelling or redness  PSYCHOLOGICAL: Denies anxiety, depression   SKIN: Denies skin changes, delayed healing, rash, itching   HEMATOLOGIC: Denies easy bleeding or bruising  ENDOCRINE: Denies excessive thirst, urination, heat/cold  RESPIRATORY: Denies current dyspnea, cough  GI: Denies nausea, vomiting, diarrhea   : Denies bowel or bladder issues       PHYSICAL EXAM:    Vitals: Height 5' 6\" (1.676 m), weight 160 lb (72.6 kg).    GENERAL EXAM:  General Apparence: Patient is adequately groomed with no evidence of malnutrition.  Orientation: The patient is oriented to time, place and person.   Mood & Affect:The patient's mood and affect are appropriate   Vascular: Examination reveals no swelling tenderness in upper or lower extremities. Good capillary refill  Lymphatic: The lymphatic examination bilaterally reveals all areas to be without enlargement or induration  Sensation: Sensation is intact without deficit  Coordination/Balance: Good coordination     CERVICAL EXAMINATION:  Inspection: Local inspection shows no step-off or bruising.  Cervical alignment is normal.     Palpation: No evidence of tenderness at the midline, and trapezius.  Paraspinal tenderness is present. There is no step-off or paraspinal spasm.   Range of Motion: Cervical flexion, extension, and rotation are mildly reduced with pain.  Strength: 5/5 bilateral upper extremities   Special Tests:     Juan's negative bilaterally.       Cubital and Carpal tunnel Tinel's negative bilaterally.      Skin:There are no rashes, ulcerations or lesions in right & left upper extremities.  Reflexes: Bilaterally triceps, biceps and brachioradialis are 2+.  Clonus absent bilaterally at the feet.   Gait & station: normal, patient ambulates without assistance     Additional Examinations:       RIGHT  UPPER EXTREMITY:  Inspection/examination of the right upper extremity does not show any tenderness, deformity or injury. Range of motion is unremarkable. There is no gross instability. There are no rashes, ulcerations or lesions. Strength and tone are normal.  LEFT UPPER EXTREMITY: Inspection/examination of the left upper extremity does not show any tenderness, deformity or injury. Range of motion is unremarkable. There is no gross instability. There are no rashes, ulcerations or lesions. Strength and tone are normal.    Diagnostic Testing:  Reviewed CT a images of her head and neck from 5/24/2022 in the office today. Cervical spondylosis C5-C6 with mild foraminal stenosis on the left    Reviewed AP and lateral x-rays of her cervical spine that were obtained in the office today. Those show thoracic scoliosis and cervical spondylosis worse C5-C6    Impression:   Cervical spondylosis  Lumbar degenerative disc disease    Plan:    Discussed treatment options including observation, physical therapy, medications, epidural injections and additional imaging. She would like to proceed with physical therapy and a home traction unit. She may call to schedule cervical MRI if her symptoms persist after those.

## 2023-01-23 ENCOUNTER — HOSPITAL ENCOUNTER (OUTPATIENT)
Dept: PHYSICAL THERAPY | Age: 48
Setting detail: THERAPIES SERIES
Discharge: HOME | End: 2023-01-23
Payer: MEDICAID

## 2023-01-23 PROCEDURE — 97161 PT EVAL LOW COMPLEX 20 MIN: CPT | Performed by: PHYSICAL THERAPIST

## 2023-01-23 PROCEDURE — 97112 NEUROMUSCULAR REEDUCATION: CPT | Performed by: PHYSICAL THERAPIST

## 2023-01-23 PROCEDURE — 97140 MANUAL THERAPY 1/> REGIONS: CPT | Performed by: PHYSICAL THERAPIST

## 2023-01-23 PROCEDURE — G0283 ELEC STIM OTHER THAN WOUND: HCPCS | Performed by: PHYSICAL THERAPIST

## 2023-01-23 NOTE — FLOWSHEET NOTE
[facility],  [address]  Phone: [phone]  Fax [phone]      Physical Therapy Treatment Note/ Progress Report:       Date:  2023    Patient Name:  Alexi Abrams    :  1975  MRN: [id number]  Restrictions/Precautions:    Medical/Treatment Diagnosis Information:    M43.02 (ICD-10-CM) - Cervical spondylolysis   M41.9 (ICD-10-CM) - Scoliosis of thoracic spine, unspecified scoliosis type   Treatment diagnosis:Cervical pain R18.9     Insurance/Certification information:  PT Insurance Information: Pedro Velasquez  Physician Information:  Taco Burris MD  Has the plan of care been signed (Y/N):        []  Yes  [x]  No     Date of Patient follow up with Physician:     Is this a Progress Report:     []  Yes  [x]  No        If Yes:  Date Range for reporting period:  Beginning 23  Ending    Progress report will be due (10 Rx or 30 days whichever is less): 71       Recertification will be due (POC Duration  / 90 days whichever is less): 8 weeks        Visit # Insurance Allowable Auth Required   In-person 1  []  Yes []  No    Telehealth   []  Yes []  No    Total        Functional Scale: NDI 36%    Date assessed:  23   Therapy Diagnosis/Practice Pattern:F      Number of Comorbidities:  []0     [x]1-2    []3+     Latex Allergy:  [x]NO      []YES  Preferred Language for Healthcare:   [x]English       []other:    Pain level:  3-7/10     SUBJECTIVE:  See eval    OBJECTIVE: See eval  Observation:   Test measurements:      RESTRICTIONS/PRECAUTIONS:     Exercises/Interventions:     Access Code: [email or web address]  URL: [email or web address]  Date: 2023  Prepared by: Leticia Purdy    Exercises  Supine Cervical Retraction Passive Unloaded - 3-4 x daily - 7 x weekly - 3 sets - 10 reps  Seated Scapular Retraction - 3-4 x daily - 7 x weekly - 3 sets - 10 reps  Seated Upper Trapezius Stretch - 2 x daily - 7 x weekly - 1 sets - 5 reps - 10 hold  Seated Levator Scapulae Stretch on Wall - 2 x daily - 7 x weekly - 1 sets - 5 reps - 10 hold    Therapeutic Ex        Sets/sec Reps Notes         Scap retraction seated 1 15                UT stretch :10 5    Levator stretch :10 5                            Chin tuck-supine 3 10                            Manual Intervention           Cerv mobs/manip      Thoracic mobs/manip      CT manip-seated lift X  cavitation   Rib mobilizations supine rib 1-2 B 6'     STM      Chin tucks supine  3 10 1 pillow         NMR re-education          Pt. Edu for posture correction, ergonomics for computer use, HEP, prognosis, progression 8'                             Traction                     Manual supine cervical traction  3'               Therapeutic Exercise and NMR EXR  [x] (59643) Provided verbal/tactile cueing for activities related to strengthening, flexibility, endurance, ROM  for improvements in cervical, postural, scapular, scapulothoracic and UE control with self care, reaching, carrying, lifting, house/yardwork, driving/computer work.    [] (90771) Provided verbal/tactile cueing for activities related to improving balance, coordination, kinesthetic sense, posture, motor skill, proprioception  to assist with cervical, scapular, scapulothoracic and UE control with self care, reaching, carrying, lifting, house/yardwork, driving/computer work. Therapeutic Activities:    [] (35185 or 67288) Provided verbal/tactile cueing for activities related to improving balance, coordination, kinesthetic sense, posture, motor skill, proprioception and motor activation to allow for proper function of cervical, scapular, scapulothoracic and UE control with self care, carrying, lifting, driving/computer work.      Home Exercise Program:    [x] (45386) Reviewed/Progressed HEP activities related to strengthening, flexibility, endurance, ROM of cervical, scapular, scapulothoracic and UE control with self care, reaching, carrying, lifting, house/yardwork, driving/computer work  [] (51586) Reviewed/Progressed HEP activities related to improving balance, coordination, kinesthetic sense, posture, motor skill, proprioception of cervical, scapular, scapulothoracic and UE control with self care, reaching, carrying, lifting, house/yardwork, driving/computer work      Manual Treatments:  PROM / STM / Oscillations-Mobs:  G-I, II, III, IV (PA's, Inf., Post.)  [x] (61749) Provided manual therapy to mobilize soft tissue/joints of cervical/CT, scapular GHJ and UE for the purpose of decreasing headache, modulating pain, promoting relaxation,  increasing ROM, reducing/eliminating soft tissue swelling/inflammation/restriction, improving soft tissue extensibility and allowing for proper ROM for normal function with self care, reaching, carrying, lifting, house/yardwork, driving/computer work    Modalities:      Charges  Timed Code Treatment Minutes: 25   Total Treatment Minutes: 65     Medicare total (add KX at $2000)         BWC time in/ time out:    TE time in /out    Manual time in/out    Neuro re ed time in/out    Untimed minutes          [x] EVAL (LOW) 70039   [] EVAL (MOD) 40917   [] EVAL (HIGH) 92200   [] RE-EVAL     [] TE(08054) x     [] IONTO  [x] NMR (14286) x 1    [] VASO  [x] Manual (52628) x  1    [] Other:  [] TA x      [] Mech Traction (67824)  [] ES(attended) (20463)      [x] ES (un) (18466):     GOALS:Patient stated goal: to be able to sleep without waking due to pain.  [] Progressing: [] Met: [] Not Met: [] Adjusted     Therapist goals for Patient:   Short Term Goals: To be achieved in: 2 weeks  1. Independent in HEP and progression per patient tolerance, in order to prevent re-injury.   [] Progressing: [] Met: [] Not Met: [] Adjusted  2. Patient will have a decrease in pain to facilitate improvement in movement, function, and ADLs as indicated by Functional Deficits.  [] Progressing: [] Met: [] Not Met: []  Adjusted     Long Term Goals: To be achieved in: 8 weeks  1. Disability index score of 18% or less for the NDI to assist with reaching prior level of function. [] Progressing: [] Met: [] Not Met: [] Adjusted  2. Patient will demonstrate [facility]/thoracic [facility] to allow for proper joint functioning as indicated by patients Functional Deficits. [] Progressing: [] Met: [] Not Met: [] Adjusted  3. Patient will demonstrate an increase in postural awareness and control and activation of  Deep cervical stabilizers to allow for proper functional mobility as indicated by patients Functional Deficits. [] Progressing: [] Met: [] Not Met: [] Adjusted   4. Patient will return to sitting/computer work functional activities without increased symptoms or restriction. [] Progressing: [] Met: [] Not Met: [] Adjusted  5. Pt. To be able to sleep for 6 hours without waking due to neck/UE pain.(patient specific functional goal)   [] Progressing: [] Met: [] Not Met: [] Adjusted            Overall Progression Towards Functional goals/ Treatment Progress Update:  [] Patient is progressing as expected towards functional goals listed. [] Progression is slowed due to complexities/Impairments listed. [] Progression has been slowed due to co-morbidities.   [x] Plan just implemented, too soon to assess goals progression <30days   [] Goals require adjustment due to lack of progress  [] Patient is not progressing as expected and requires additional follow up with physician  [] Other    Prognosis for POC: [x] Good [] Fair  [] Poor      Patient requires continued skilled intervention: [x] Yes  [] No      ASSESSMENT:  See eval    Treatment/Activity Tolerance:  [x] Patient tolerated treatment well [] Patient limited by fatique  [] Patient limited by pain  [] Patient limited by other medical complications  [] Other:     Prognosis: [] Good [] Fair  [] Poor    Patient Requires Follow-up: [x] Yes  [] No    PLAN: See eval  [] Continue per plan of care [] Alter current plan (see comments above)  [x] Plan of care initiated [] Hold pending MD visit [] Discharge      Electronically signed by:  Elpidio Tam, PT, MSPT, OMT-C [id number]      Note: If patient does not return for scheduled/ recommended follow up visits, this note will serve as a discharge from care along with most recent update on progress.

## 2023-01-23 NOTE — PLAN OF CARE
[facility], [address]  Phone: [phone]  Fax [phone]   Physical Therapy Certification    Dear Azam Workman MD,    We had the pleasure of evaluating the following patient for physical therapy services at [address] [place] [address]. A summary of our findings can be found in the initial assessment below. This includes our plan of care. If you have any questions or concerns regarding these findings, please do not hesitate to contact me at the office phone number checked above. Thank you for the referral.       Physician Signature:_______________________________Date:__________________  By signing above (or electronic signature), therapists plan is approved by physician    Patient: Cecilia Martinez   : 1975   MRN: [id number]  Referring Physician: Azam Workman MD      Evaluation Date: 2023      Medical Diagnosis Information:  M43.02 (ICD-10-CM) - Cervical spondylolysis   M41.9 (ICD-10-CM) - Scoliosis of thoracic spine, unspecified scoliosis type   Treatment diagnosis:Cervical pain M54.2                                         Insurance information: PT Insurance Information: Mary Lockwood      Precautions/ Contra-indications: OA, recent bout of vertigo, severe, requiring hospitalization    C-SSRS Triggered by Intake questionnaire (Past 2 wk assessment):   [x] No, Questionnaire did not trigger screening.   [] Yes, Patient intake triggered further evaluation      [] C-SSRS Screening completed  [] PCP notified via Plan of Care  [] Emergency services notified     Latex Allergy:  [x]NO      []YES  Preferred Language for Healthcare:   [x]English       []other:    SUBJECTIVE: Patient stated complaint:pt. Reports onset of neck pain with additional onset of B hand Numbness, and pain into the R hand and forearm. She is taking classes and has to sit for long periods of time using the lap top.   At times she is not able to perform chopping because of the pain. 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)    OBJECTIVE:     CERV ROM     Cervical Flexion 42    Cervical Extension 30    Cervical SB L 20*, R 30    Cervical rotation L 40%* R 70 %        ROM Left Right   Shoulder Flex 149 148   Shoulder Abd 170 145   Shoulder ER T3 T3   Shoulder IR T6 T8             Strength  Left Right   Shoulder Flex 5 5   Shoulder Scap 5 5   Shoulder ER 5 4   Shoulder IR 5 5             Reflexes Left Right   C5-6 Biceps 2+ 2+   C5-6 Brachioradialis 2+ 2+   C7-8 Triceps 2+ 2+     Reflexes/Sensation:    [x]Dermatomes/Myotomes intact    [x]Reflexes equal and normal bilaterally   [x]Other:clonus absent    Joint mobility:    []Normal    [x]Hypo Lrib 1, lower cervical SG to R in neutral and flexion   []Hyper    Palpation: TTP L LS    Functional Mobility/Transfers: I    Posture: rounded shoulders, thoracic scoliosis    Bandages/Dressings/Incisions: NA    Gait: (include devices/WB status): WNL     Orthopedic Special Tests: (-)C2 sp kick, (-)Sharp-Pursar                       [x] Patient history, allergies, meds reviewed. Medical chart reviewed. See intake form. Review Of Systems (ROS):  [x]Performed Review of systems (Integumentary, CardioPulmonary, Neurological) by intake and observation. Intake form has been scanned into medical record.  Patient has been instructed to contact their primary care physician regarding ROS issues if not already being addressed at this time. Co-morbidities/Complexities (which will affect course of rehabilitation):   []None           Arthritic conditions   []Rheumatoid arthritis (M05.9)  [x]Osteoarthritis (M19.91)   Cardiovascular conditions   []Hypertension (I10)  []Hyperlipidemia (E78.5)  []Angina pectoris (I20)  []Atherosclerosis (I70)  []CVA Musculoskeletal conditions   [x]Disc pathology   []Congenital spine pathologies   []Prior surgical intervention  []Osteoporosis (M81.8)  []Osteopenia (M85.8)   Endocrine conditions   []Hypothyroid (E03.9)  []Hyperthyroid Gastrointestinal conditions   []Constipation (D45.73)   Metabolic conditions   []Morbid obesity (E66.01)  []Diabetes type 1(E10.65) or 2 (E11.65)   []Neuropathy (G60.9)     Pulmonary conditions   []Asthma (J45)  []Coughing   []COPD (J44.9)   Psychological Disorders  []Anxiety (F41.9)  []Depression (F32.9)   []Other:   [x]Other:   vertigo       Barriers to/and or personal factors that will affect rehab potential:              []Age  []Sex   []Smoker              []Motivation/Lack of Motivation                        [x]Co-Morbidities              []Cognitive Function, education/learning barriers              [x]Environmental, home barriers              [x]profession/work barriers  []past PT/medical experience  []other:  Justification: full time student, full time parent, full time working both teaching and admin. Falls Risk Assessment (30 days):   [x] Falls Risk assessed and no intervention required.   [] Falls Risk assessed and Patient requires intervention due to being higher risk   TUG score (>12s at risk):     [] Falls education provided, including         ASSESSMENT:    Functional Impairments:     [x]Noted cervical/thoracic/GHJ joint hypomobility   []Noted cervical/thoracic/GHJ joint hypermobility   [x]Decreased cervical/UE functional ROM   []Noted Headache pain aggravated by neck movements with/without dizziness   []Abnormal reflexes/sensation/myotomal/dermatomal deficits   []Decreased DCF control or ability to hold head up   []Decreased RC/scapular/core strength and neuromuscular control    [x]Decreased UE functional strength   []other:      Functional Activity Limitations (from functional questionnaire and intake)   [x]Reduced ability to tolerate prolonged functional positions   []Reduced ability or difficulty with changes of positions or transfers between positions   []Reduced ability to maintain good posture and demonstrate good body mechanics with sitting, bending, and lifting   [x] Reduced ability or tolerance with driving and/or computer work   []Reduced ability to perform lifting, reaching, carrying tasks   []Reduced ability to concentrate   [x]Reduced ability to sleep    []Reduced ability to tolerate any impact through UE or spine   []Reduced ability to ambulate prolonged functional periods/distances   [x]other:sitting    Participation Restrictions   []Reduced participation in self care activities   []Reduced participation in home management activities   [x]Reduced participation in work activities/studying   []Reduced participation in social activities. []Reduced participation in sport/recreational activities.     Classification/Subgrouping:   [x]signs/symptoms consistent with neck pain with mobility deficits     []signs/symptoms consistent with neck pain with movement coordinated impairments    [x]signs/symptoms consistent with neck pain with radiating pain    []signs/symptoms consistent with neck pain with headaches (cervicogenic)    []Signs/symptoms consistent with nerve root involvement including myotome & dermatome dysfunction   []sign/symptoms consistent with facet dysfunction of cervical and thoracic spine    []signs/symptoms consistent suggesting central cord compression/UMN syndromes   []signs/symptoms consistent with discogenic cervical pain   [x]signs/symptoms consistent with rib dysfunction   [x]signs/symptoms consistent with postural dysfunction   []signs/symptoms consistent with shoulder pathology    []signs/symptoms consistent with post-surgical status including decreased ROM, strength and function. []signs/symptoms consistent with pathology which may benefit from Dry Needling   []signs/symptoms which may limit the use of advanced manual therapy techniques: (Elevated CV risk profile, recent trauma, intolerance to end range positions, prior TIA, visual issues, UE neurological compromise )     Prognosis/Rehab Potential:      []Excellent   [x]Good    [x]Fair   []Poor    Tolerance of evaluation/treatment:    []Excellent   [x]Good    []Fair   []Poor    Physical Therapy Evaluation Complexity Justification  [x] A history of present problem with:  [] no personal factors and/or comorbidities that impact the plan of care;  [x]1-2 personal factors and/or comorbidities that impact the plan of care  []3 personal factors and/or comorbidities that impact the plan of care  [x] An examination of body systems using standardized tests and measures addressing any of the following: body structures and functions (impairments), activity limitations, and/or participation restrictions;:  [] a total of 1-2 or more elements   [x] a total of 3 or more elements   [] a total of 4 or more elements   [x] A clinical presentation with:  [x] stable and/or uncomplicated characteristics   [] evolving clinical presentation with changing characteristics  [] unstable and unpredictable characteristics;   [x] Clinical decision making of [x] low, [] moderate, [] high complexity using standardized patient assessment instrument and/or measurable assessment of functional outcome.     [x] EVAL (LOW) 65341 (typically 20 minutes face-to-face)  [] EVAL (MOD) 95699 (typically 30 minutes face-to-face)  [] EVAL (HIGH) 05164 (typically 45 minutes face-to-face)  [] RE-EVAL     PLAN:   Frequency/Duration:  1-2 days per week for 8 Weeks:  Interventions:  [x]  Therapeutic exercise including: strength training, ROM, for cervical spine,scapula, core and Upper extremity, including postural re-education. [x]  NMR activation and proprioception for Deep cervical flexors, periscapular and RC muscles and Core, including postural re-education. [x]  Manual therapy as indicated for C/T spine, ribs, Soft tissue to include: Dry Needling/IASTM, STM, PROM, Gr I-IV mobilizations, manipulation. [x] Modalities as needed that may include: thermal agents, E-stim, Biofeedback, US, iontophoresis as indicated  [x] Patient education on joint protection, postural re-education, activity modification, progression of HEP. HEP instruction: refer to [address] [place] [address] access code and exercises on the 1st visit treatment note     GOALS:  Patient stated goal: to be able to sleep without waking due to pain. [] Progressing: [] Met: [] Not Met: [] Adjusted    Therapist goals for Patient:   Short Term Goals: To be achieved in: 2 weeks  1. Independent in HEP and progression per patient tolerance, in order to prevent re-injury. [] Progressing: [] Met: [] Not Met: [] Adjusted  2. Patient will have a decrease in pain to facilitate improvement in movement, function, and ADLs as indicated by Functional Deficits. [] Progressing: [] Met: [] Not Met: [] Adjusted    Long Term Goals: To be achieved in: 8 weeks  1. Disability index score of 18% or less for the NDI to assist with reaching prior level of function. [] Progressing: [] Met: [] Not Met: [] Adjusted  2. Patient will demonstrate increased AROM to [facility] of cervical/thoracic spine to allow for proper joint functioning as indicated by patients Functional Deficits. [] Progressing: [] Met: [] Not Met: [] Adjusted  3. Patient will demonstrate an increase in postural awareness and control and activation of  Deep cervical stabilizers to allow for proper functional mobility as indicated by patients Functional Deficits.   [] Progressing: [] Met: [] Not Met: [] Adjusted   4. Patient will return to sitting/computer work functional activities without increased symptoms or restriction. [] Progressing: [] Met: [] Not Met: [] Adjusted  5. Pt.  To be able to sleep for 6 hours without waking due to neck/UE pain.(patient specific functional goal)   [] Progressing: [] Met: [] Not Met: [] Adjusted      Electronically signed by:  Jodie Lange, PT, [address] [facility], T-C [id number]

## 2023-01-27 ENCOUNTER — HOSPITAL ENCOUNTER (OUTPATIENT)
Dept: PHYSICAL THERAPY | Age: 48
Setting detail: THERAPIES SERIES
Discharge: HOME | End: 2023-01-27
Payer: MEDICAID

## 2023-01-27 PROCEDURE — G0283 ELEC STIM OTHER THAN WOUND: HCPCS | Performed by: PHYSICAL THERAPIST

## 2023-01-27 PROCEDURE — 97110 THERAPEUTIC EXERCISES: CPT | Performed by: PHYSICAL THERAPIST

## 2023-01-27 PROCEDURE — 97140 MANUAL THERAPY 1/> REGIONS: CPT | Performed by: PHYSICAL THERAPIST

## 2023-01-27 NOTE — FLOWSHEET NOTE
[facility], [address]  Phone: [phone]  Fax [phone]      Physical Therapy Treatment Note/ Progress Report:       Date:  2023    Patient Name:  Priti Mason    :  1975  MRN: [id number]  Restrictions/Precautions:    Medical/Treatment Diagnosis Information:    M43.02 (ICD-10-CM) - Cervical spondylolysis   M41.9 (ICD-10-CM) - Scoliosis of thoracic spine, unspecified scoliosis type   Treatment diagnosis:Cervical pain I90.1     Insurance/Certification information:  PT Insurance Information: Linus Abad  Physician Information:  Eugenie Gomez MD  Has the plan of care been signed (Y/N):        []  Yes  [x]  No     Date of Patient follow up with Physician:     Is this a Progress Report:     []  Yes  [x]  No        If Yes:  Date Range for reporting period:  Beginning 23  Ending    Progress report will be due (10 Rx or 30 days whichever is less):        Recertification will be due (POC Duration  / 90 days whichever is less): 8 weeks        Visit # Insurance Allowable Auth Required   In-person 2  []  Yes []  No    Telehealth   []  Yes []  No    Total        Functional Scale: NDI 36%    Date assessed:  23   Therapy Diagnosis/Practice Pattern:F      Number of Comorbidities:  []0     [x]1-2    []3+     Latex Allergy:  [x]NO      []YES  Preferred Language for Healthcare:   [x]English       []other:    Pain level:  3-7/10     SUBJECTIVE:  Reports having some issues with the soft collar traction unit . Slight pain after a few minutes of use. Pain and restriction still noted with turning head to L.    OBJECTIVE: See eval. Reviewed set -up and positioning of traction unit. Patient instructed to build up time and/or discontinue if painful. Observation:   Test measurements:      RESTRICTIONS/PRECAUTIONS:     Exercises/Interventions:     Access Code: [email or web address]  URL: [email or web address]  Date: 01/24/2023  Prepared by: Chloe Xiomara    Exercises  Supine Cervical Retraction Passive Unloaded - 3-4 x daily - 7 x weekly - 3 sets - 10 reps  Seated Scapular Retraction - 3-4 x daily - 7 x weekly - 3 sets - 10 reps  Seated Upper Trapezius Stretch - 2 x daily - 7 x weekly - 1 sets - 5 reps - 10 hold  Seated Levator Scapulae Stretch on Wall - 2 x daily - 7 x weekly - 1 sets - 5 reps - 10 hold    Therapeutic Ex        Sets/sec Reps Notes         Scap retraction seated 1 15                UT stretch :10 5    Levator stretch :10 5                FR pec stretches 3'  Some back discomfort after   Seated no money exercise H5 2x10 reps    Chin tuck-supine 3 10                            Manual Intervention           Cerv mobs/manip 10'     Thoracic mobs/manip 5'  In prone with cavitation noted   CT manip-seated lift Rib mobilizations supine rib 1-2 B 6'     STM      Chin tucks supine  3 10 1 pillow   C6-T1 Snags with L rotation and O.P    H6 6 reps    NMR re-education          Pt. Edu for posture correction, ergonomics for computer use, HEP, prognosis, progression                            Traction                     Manual supine cervical traction  5'               Therapeutic Exercise and NMR EXR  [x] (38621) Provided verbal/tactile cueing for activities related to strengthening, flexibility, endurance, ROM  for improvements in cervical, postural, scapular, scapulothoracic and UE control with self care, reaching, carrying, lifting, house/yardwork, driving/computer work.    [] (19895) Provided verbal/tactile cueing for activities related to improving balance, coordination, kinesthetic sense, posture, motor skill, proprioception  to assist with cervical, scapular, scapulothoracic and UE control with self care, reaching, carrying, lifting, house/yardwork, driving/computer work.     Therapeutic Activities:    [] (46857 or ) Provided verbal/tactile cueing for activities related to improving balance, coordination, kinesthetic sense, posture, motor skill, proprioception and motor activation to allow for proper function of cervical, scapular, scapulothoracic and UE control with self care, carrying, lifting, driving/computer work. Home Exercise Program:    [x] (22569) Reviewed/Progressed HEP activities related to strengthening, flexibility, endurance, ROM of cervical, scapular, scapulothoracic and UE control with self care, reaching, carrying, lifting, house/yardwork, driving/computer work  [] (00909) Reviewed/Progressed HEP activities related to improving balance, coordination, kinesthetic sense, posture, motor skill, proprioception of cervical, scapular, scapulothoracic and UE control with self care, reaching, carrying, lifting, house/yardwork, driving/computer work      Manual Treatments:  PROM / STM / Oscillations-Mobs:  G-I, II, III, IV (PA's, Inf., Post.)  [x] (07187) Provided manual therapy to mobilize soft tissue/joints of cervical/CT, scapular GHJ and UE for the purpose of decreasing headache, modulating pain, promoting relaxation,  increasing ROM, reducing/eliminating soft tissue swelling/inflammation/restriction, improving soft tissue extensibility and allowing for proper ROM for normal function with self care, reaching, carrying, lifting, house/yardwork, driving/computer work    Modalities:  PM ES to B neck/UT x15 min with MH    Charges  Timed Code Treatment Minutes: 39'   Total Treatment Minutes: 60     Medicare total (add KX at $2000)         BW time in/ time out:    TE time in /out    Manual time in/out    Neuro re ed time in/out    Untimed minutes          [] EVAL (LOW) 62478   [] EVAL (MOD) 02238   [] EVAL (HIGH) 16054   [] RE-EVAL     [x] EN(63005) x  1   [] IONTO  [] NMR (99405) x    [] VASO  [x] Manual (64478) x  2   [] Other:  [] TA x      [] Mech Traction (40421)  [] ES(attended) (12397)      [x] ES (un) (61183):     Anna Gonzalez stated goal: to be able to sleep without waking due to pain.   [] Progressing: [] Met: [] Not Met: [] Adjusted     Therapist goals for Patient:   Short Term Goals: To be achieved in: 2 weeks  1. Independent in HEP and progression per patient tolerance, in order to prevent re-injury.   [] Progressing: [] Met: [] Not Met: [] Adjusted  2. Patient will have a decrease in pain to facilitate improvement in movement, function, and ADLs as indicated by Functional Deficits.  [] Progressing: [] Met: [] Not Met: [] Adjusted     Long Term Goals: To be achieved in: 8 weeks  1. Disability index score of 18% or less for the NDI to assist with reaching prior level of function.   [] Progressing: [] Met: [] Not Met: [] Adjusted  2. Patient will demonstrate increased AROM to WFL of cervical/thoracic spine to allow for proper joint functioning as indicated by patients Functional Deficits.   [] Progressing: [] Met: [] Not Met: [] Adjusted  3. Patient will demonstrate an increase in postural awareness and control and activation of  Deep cervical stabilizers to allow for proper functional mobility as indicated by patients Functional Deficits.  [] Progressing: [] Met: [] Not Met: [] Adjusted   4. Patient will return to sitting/computer work functional activities without increased symptoms or restriction.   [] Progressing: [] Met: [] Not Met: [] Adjusted  5. Pt. To be able to sleep for 6 hours without waking due to neck/UE pain.(patient specific functional goal)   [] Progressing: [] Met: [] Not Met: [] Adjusted            Overall Progression Towards Functional goals/ Treatment Progress Update:  [] Patient is progressing as expected towards functional goals listed.    [] Progression is slowed due to complexities/Impairments listed.  [] Progression has been slowed due to co-morbidities.  [x] Plan just implemented, too soon to assess goals progression <30days   [] Goals require adjustment due to lack of progress  [] Patient is not progressing as expected and requires additional follow up with physician  []  Other    Prognosis for POC: [x] Good [] Fair  [] Poor      Patient requires continued skilled intervention: [x] Yes  [] No      ASSESSMENT:  See eval. Increased L cervical rotation with Snags to lower cervical/upper t-spine. Better after visit with less pain. Pt requires skilled intervention to restore ROM, strength, functional endurance and balance in order to perform ADLs without significant symptoms or limitations. Treatment/Activity Tolerance:  [x] Patient tolerated treatment well [] Patient limited by fatique  [] Patient limited by pain  [] Patient limited by other medical complications  [] Other:     Prognosis: [x] Good [] Fair  [] Poor    Patient Requires Follow-up: [x] Yes  [] No    PLAN: See eval. Progress postural correction exercises as tolerated,  [x] Continue per plan of care [] Alter current plan (see comments above)  [] Plan of care initiated [] Hold pending MD visit [] Discharge      Electronically signed by:  Yarelis Loera, PT, ,OMT-C       Note: If patient does not return for scheduled/ recommended follow up visits, this note will serve as a discharge from care along with most recent update on progress.

## 2023-01-30 ENCOUNTER — HOSPITAL ENCOUNTER (OUTPATIENT)
Dept: PHYSICAL THERAPY | Age: 48
Setting detail: THERAPIES SERIES
Discharge: HOME | End: 2023-01-30
Payer: MEDICAID

## 2023-01-30 PROCEDURE — G0283 ELEC STIM OTHER THAN WOUND: HCPCS | Performed by: PHYSICAL THERAPIST

## 2023-01-30 PROCEDURE — 97140 MANUAL THERAPY 1/> REGIONS: CPT | Performed by: PHYSICAL THERAPIST

## 2023-01-30 PROCEDURE — 97110 THERAPEUTIC EXERCISES: CPT | Performed by: PHYSICAL THERAPIST

## 2023-01-30 NOTE — FLOWSHEET NOTE
[facility], [address]  Phone: [phone]  Fax [phone]      Physical Therapy Treatment Note/ Progress Report:       Date:  2023    Patient Name:  Jess Donnelly    :  1975  MRN: [id number]  Restrictions/Precautions:    Medical/Treatment Diagnosis Information:    M43.02 (ICD-10-CM) - Cervical spondylolysis   M41.9 (ICD-10-CM) - Scoliosis of thoracic spine, unspecified scoliosis type   Treatment diagnosis:Cervical pain C79.9     Insurance/Certification information:  PT Insurance Information: Chandrika Scriver  Physician Information:  Shea Smith MD  Has the plan of care been signed (Y/N):        []  Yes  [x]  No     Date of Patient follow up with Physician:     Is this a Progress Report:     []  Yes  [x]  No        If Yes:  Date Range for reporting period:  Beginning 23  Ending    Progress report will be due (10 Rx or 30 days whichever is less):        Recertification will be due (POC Duration  / 90 days whichever is less): 8 weeks        Visit # Insurance Allowable Auth Required   In-person 3  []  Yes []  No    Telehealth   []  Yes []  No    Total        Functional Scale: NDI 36%    Date assessed:  23   Therapy Diagnosis/Practice Pattern:F      Number of Comorbidities:  []0     [x]1-2    []3+     Latex Allergy:  [x]NO      []YES  Preferred Language for Healthcare:   [x]English       []other:    Pain level:  3-7/10     SUBJECTIVE:  pt. Reports that she has been a little better, did not study as much this weekend and not on the computer. pt. Reporting fatigue in B hands and numbing at night, no pain in the 3rd digit, continued pan L upper shoulder/neck and waking at night with this pain    OBJECTIVE: See eval. Reviewed set -up and positioning of traction unit. Patient instructed to build up time and/or discontinue if painful.   Observation:   Test measurements: RESTRICTIONS/PRECAUTIONS:     Exercises/Interventions:     Access Code: [email or web address]  URL: [email or web address]  Date: 01/24/2023  Prepared by: Mendy Vicente    Exercises  Supine Cervical Retraction Passive Unloaded - 3-4 x daily - 7 x weekly - 3 sets - 10 reps  Seated Scapular Retraction - 3-4 x daily - 7 x weekly - 3 sets - 10 reps  Seated Upper Trapezius Stretch - 2 x daily - 7 x weekly - 1 sets - 5 reps - 10 hold  Seated Levator Scapulae Stretch on Wall - 2 x daily - 7 x weekly - 1 sets - 5 reps - 10 hold    Therapeutic Ex        Sets/sec Reps Notes         Scap retraction seated 1 15                UT stretch :10 5    Levator stretch :10 5                FR pec stretches low-mid-high 3'  Use of long towel roll instead of FR   Seated no money exercise dndH5 2x10 reps    Chin tuck-supine 3 10    Supine L shoulder ER at 90 with band 2 10 red                     Manual Intervention           Cerv mobs/manip 10'     Thoracic mobs/manip 5'  In prone with cavitation noted   CT manip-seated lift Rib mobilizations supine rib 1 L, prone rib 2 L 6'     STM prone L LS 3'     Supine median n. Sliders L 3 10 Starting in neutral with spine then R rotation   Chin tucks supine    1 pillow   C6-T1 Snags with L rotation and O.P    H6 6 reps    NMR re-education          Pt.  Edu for posture correction, ergonomics for computer use, HEP, prognosis, progression                            Traction                     Manual supine cervical traction  5'               Therapeutic Exercise and NMR EXR  [x] (73494) Provided verbal/tactile cueing for activities related to strengthening, flexibility, endurance, ROM  for improvements in cervical, postural, scapular, scapulothoracic and UE control with self care, reaching, carrying, lifting, house/yardwork, driving/computer work.    [] (32067) Provided verbal/tactile cueing for activities related to improving balance, coordination, kinesthetic sense, posture, motor skill, proprioception  to assist with cervical, scapular, scapulothoracic and UE control with self care, reaching, carrying, lifting, house/yardwork, driving/computer work. Therapeutic Activities:    [] (18926 or 46607) Provided verbal/tactile cueing for activities related to improving balance, coordination, kinesthetic sense, posture, motor skill, proprioception and motor activation to allow for proper function of cervical, scapular, scapulothoracic and UE control with self care, carrying, lifting, driving/computer work.      Home Exercise Program:    [x] (55947) Reviewed/Progressed HEP activities related to strengthening, flexibility, endurance, ROM of cervical, scapular, scapulothoracic and UE control with self care, reaching, carrying, lifting, house/yardwork, driving/computer work  [] (09456) Reviewed/Progressed HEP activities related to improving balance, coordination, kinesthetic sense, posture, motor skill, proprioception of cervical, scapular, scapulothoracic and UE control with self care, reaching, carrying, lifting, house/yardwork, driving/computer work      Manual Treatments:  PROM / STM / Oscillations-Mobs:  G-I, II, III, IV (PA's, Inf., Post.)  [x] (86006) Provided manual therapy to mobilize soft tissue/joints of cervical/CT, scapular GHJ and UE for the purpose of decreasing headache, modulating pain, promoting relaxation,  increasing ROM, reducing/eliminating soft tissue swelling/inflammation/restriction, improving soft tissue extensibility and allowing for proper ROM for normal function with self care, reaching, carrying, lifting, house/yardwork, driving/computer work    Modalities:  PM ES to B neck/UT x15 min with MH    Charges  Timed Code Treatment Minutes: 39'   Total Treatment Minutes: 60     Medicare total (add KX at $2000)         BWC time in/ time out:    TE time in /out    Manual time in/out    Neuro re ed time in/out    Untimed minutes          [] EVAL (LOW) 55825   [] EVAL (MOD) 63270   [] EVAL (HIGH) 43788   [] RE-EVAL     [x] TG(70010) x  1   [] IONTO  [] NMR (26093) x    [] VASO  [x] Manual (88399) x  2   [] Other:  [] TA x      [] Mech Traction (31218)  [] ES(attended) (79764)      [x] ES (un) (72637):     Kevin Lucero stated goal: to be able to sleep without waking due to pain. [] Progressing: [] Met: [] Not Met: [] Adjusted     Therapist goals for Patient:   Short Term Goals: To be achieved in: 2 weeks  1. Independent in HEP and progression per patient tolerance, in order to prevent re-injury. [] Progressing: [] Met: [] Not Met: [] Adjusted  2. Patient will have a decrease in pain to facilitate improvement in movement, function, and ADLs as indicated by Functional Deficits. [] Progressing: [] Met: [] Not Met: [] Adjusted     Long Term Goals: To be achieved in: 8 weeks  1. Disability index score of 18% or less for the NDI to assist with reaching prior level of function. [] Progressing: [] Met: [] Not Met: [] Adjusted  2. Patient will demonstrate increased AROM to [facility] of cervical/thoracic spine to allow for proper joint functioning as indicated by patients Functional Deficits. [] Progressing: [] Met: [] Not Met: [] Adjusted  3. Patient will demonstrate an increase in postural awareness and control and activation of  Deep cervical stabilizers to allow for proper functional mobility as indicated by patients Functional Deficits. [] Progressing: [] Met: [] Not Met: [] Adjusted   4. Patient will return to sitting/computer work functional activities without increased symptoms or restriction. [] Progressing: [] Met: [] Not Met: [] Adjusted  5. Pt. To be able to sleep for 6 hours without waking due to neck/UE pain.(patient specific functional goal)   [] Progressing: [] Met: [] Not Met: [] Adjusted            Overall Progression Towards Functional goals/ Treatment Progress Update:  [] Patient is progressing as expected towards functional goals listed.     [] Progression is slowed due to complexities/Impairments listed. [] Progression has been slowed due to co-morbidities. [x] Plan just implemented, too soon to assess goals progression <30days   [] Goals require adjustment due to lack of progress  [] Patient is not progressing as expected and requires additional follow up with physician  [] Other    Prognosis for POC: [x] Good [] Fair  [] Poor      Patient requires continued skilled intervention: [x] Yes  [] No      ASSESSMENT:  pt. With decreased L hand symptoms noted following rib mobs and sliders. Able to progress with supine shoulder ER as above with fatigue noted but good form and not firing UT/LS during. Cont. With progression as jose. Treatment/Activity Tolerance:  [x] Patient tolerated treatment well [] Patient limited by fatique  [] Patient limited by pain  [] Patient limited by other medical complications  [] Other:     Prognosis: [x] Good [] Fair  [] Poor    Patient Requires Follow-up: [x] Yes  [] No    PLAN: See eval. Progress postural correction exercises as tolerated,  [x] Continue per plan of care [] Alter current plan (see comments above)  [] Plan of care initiated [] Hold pending MD visit [] Discharge      Electronically signed by:  Basilia Salinas, PT, ,MSPT, OMT-C [id number]         Note: If patient does not return for scheduled/ recommended follow up visits, this note will serve as a discharge from care along with most recent update on progress.

## 2023-02-03 ENCOUNTER — HOSPITAL ENCOUNTER (OUTPATIENT)
Dept: PHYSICAL THERAPY | Age: 48
Setting detail: THERAPIES SERIES
Discharge: HOME | End: 2023-02-03
Payer: MEDICAID

## 2023-02-03 PROCEDURE — 97110 THERAPEUTIC EXERCISES: CPT | Performed by: PHYSICAL THERAPIST

## 2023-02-03 PROCEDURE — 97140 MANUAL THERAPY 1/> REGIONS: CPT | Performed by: PHYSICAL THERAPIST

## 2023-02-03 PROCEDURE — 97112 NEUROMUSCULAR REEDUCATION: CPT | Performed by: PHYSICAL THERAPIST

## 2023-02-03 PROCEDURE — G0283 ELEC STIM OTHER THAN WOUND: HCPCS | Performed by: PHYSICAL THERAPIST

## 2023-02-03 NOTE — FLOWSHEET NOTE
[facility], [address] [place] [address]  Phone: [phone]  Fax [phone]      Physical Therapy Treatment Note/ Progress Report:       Date:  2/3/2023    Patient Name:  Tawanna Fox    :  1975  MRN: [id number]  Restrictions/Precautions:    Medical/Treatment Diagnosis Information:    M43.02 (ICD-10-CM) - Cervical spondylolysis   M41.9 (ICD-10-CM) - Scoliosis of thoracic spine, unspecified scoliosis type   Treatment diagnosis:Cervical pain C98.8     Insurance/Certification information:  PT Insurance Information: [employer]  Physician Information:  Godwin Dhaliwal MD  Has the plan of care been signed (Y/N):        []  Yes  [x]  No     Date of Patient follow up with Physician:     Is this a Progress Report:     []  Yes  [x]  No        If Yes:  Date Range for reporting period:  Beginning 23  Ending    Progress report will be due (10 Rx or 30 days whichever is less): 17       Recertification will be due (POC Duration  / 90 days whichever is less): 8 weeks        Visit # Insurance Allowable Auth Required   In-person 4  []  Yes []  No    Telehealth   []  Yes []  No    Total        Functional Scale: NDI 36%    Date assessed:  23   Therapy Diagnosis/Practice Pattern:F      Number of Comorbidities:  []0     [x]1-2    []3+     Latex Allergy:  [x]NO      []YES  Preferred Language for Healthcare:   [x]English       []other:    Pain level:  3-7/10     SUBJECTIVE:  pt. Reports that she felt very comfortable for the rest of the day after last visit. Now she is feeling pain on the L side of neck, has been very busy with exams this week. OBJECTIVE: See eval. Reviewed set -up and positioning of traction unit. Patient instructed to build up time and/or discontinue if painful.   Observation: TTP L LS, fascial restriction   Test measurements:      RESTRICTIONS/PRECAUTIONS:     Exercises/Interventions:     Access Code: [email or web address]  URL: [email or web address]  Date: 01/24/2023  Prepared by: Jovita Yeh    Exercises  Supine Cervical Retraction Passive Unloaded - 3-4 x daily - 7 x weekly - 3 sets - 10 reps  Seated Scapular Retraction - 3-4 x daily - 7 x weekly - 3 sets - 10 reps  Seated Upper Trapezius Stretch - 2 x daily - 7 x weekly - 1 sets - 5 reps - 10 hold  Seated Levator Scapulae Stretch on Wall - 2 x daily - 7 x weekly - 1 sets - 5 reps - 10 hold    Therapeutic Ex        Sets/sec Reps Notes         Scap retraction seated 1 15                UT stretch :10 5    Levator stretch at wall :10 5    Corner pec stretches low elbows E/F 1 10 ea For HEP         FR pec stretches low-mid-high 3'  Use of long towel roll instead of FR   Seated no money exercise dndH5 2x10 reps    Chin tuck-supine 3 10 With towel roll under occiput   Supine L shoulder ER at 90 with band dnd2 10 red                     Manual Intervention           Cerv mobs/manip-SG's 4'     Thoracic mobs/manip 5'  In prone with cavitation noted   CT manip-seated lift Rib mobilizations supine rib 1 L, prone rib 2 L     IASTM prone L LS 8'     Supine median n. Sliders L 2 10 Starting in neutral with spine then R rotation   Chin tucks supine    1 pillow   C6-T1 Snags with L rotation and O.P       NMR re-education          Pt.  Edu for posture correction, ergonomics for computer use, HEP, prognosis, progression                            Traction                     Manual supine cervical traction  5'               Therapeutic Exercise and NMR EXR  [x] (56728) Provided verbal/tactile cueing for activities related to strengthening, flexibility, endurance, ROM  for improvements in cervical, postural, scapular, scapulothoracic and UE control with self care, reaching, carrying, lifting, house/yardwork, driving/computer work.    [] (77247) Provided verbal/tactile cueing for activities related to improving balance, coordination, kinesthetic sense, posture, motor skill, proprioception  to assist with cervical, scapular, scapulothoracic and UE control with self care, reaching, carrying, lifting, house/yardwork, driving/computer work. Therapeutic Activities:    [] (93648 or 17501) Provided verbal/tactile cueing for activities related to improving balance, coordination, kinesthetic sense, posture, motor skill, proprioception and motor activation to allow for proper function of cervical, scapular, scapulothoracic and UE control with self care, carrying, lifting, driving/computer work.      Home Exercise Program:    [x] (16480) Reviewed/Progressed HEP activities related to strengthening, flexibility, endurance, ROM of cervical, scapular, scapulothoracic and UE control with self care, reaching, carrying, lifting, house/yardwork, driving/computer work  [] (64470) Reviewed/Progressed HEP activities related to improving balance, coordination, kinesthetic sense, posture, motor skill, proprioception of cervical, scapular, scapulothoracic and UE control with self care, reaching, carrying, lifting, house/yardwork, driving/computer work      Manual Treatments:  PROM / STM / Oscillations-Mobs:  G-I, II, III, IV (PA's, Inf., Post.)  [x] (95649) Provided manual therapy to mobilize soft tissue/joints of cervical/CT, scapular GHJ and UE for the purpose of decreasing headache, modulating pain, promoting relaxation,  increasing ROM, reducing/eliminating soft tissue swelling/inflammation/restriction, improving soft tissue extensibility and allowing for proper ROM for normal function with self care, reaching, carrying, lifting, house/yardwork, driving/computer work    Modalities:  PM ES to B neck/UT x10 min with MH    Charges  Timed Code Treatment Minutes: 40'   Total Treatment Minutes: 48'     Medicare total (add KX at $2000)         BWC time in/ time out:    TE time in /out    Manual time in/out    Neuro re ed time in/out    Untimed minutes          [] EVAL (LOW) 85780   [] EVAL (MOD) 92007   [] EVAL (HIGH) 83618   [] RE-EVAL     [x] RR(44658) x  1   [] IONTO  [x] NMR (31845) x 1   [] VASO  [x] Manual (72354) x  1   [] Other:  [] TA x      [] Mech Traction (62167)  [] ES(attended) (56170)      [x] ES (un) (39769):     Jenae Shaikh stated goal: to be able to sleep without waking due to pain. [] Progressing: [] Met: [] Not Met: [] Adjusted     Therapist goals for Patient:   Short Term Goals: To be achieved in: 2 weeks  1. Independent in HEP and progression per patient tolerance, in order to prevent re-injury. [] Progressing: [] Met: [] Not Met: [] Adjusted  2. Patient will have a decrease in pain to facilitate improvement in movement, function, and ADLs as indicated by Functional Deficits. [] Progressing: [] Met: [] Not Met: [] Adjusted     Long Term Goals: To be achieved in: 8 weeks  1. Disability index score of 18% or less for the NDI to assist with reaching prior level of function. [] Progressing: [] Met: [] Not Met: [] Adjusted  2. Patient will demonstrate increased AROM to [facility] of cervical/thoracic spine to allow for proper joint functioning as indicated by patients Functional Deficits. [] Progressing: [] Met: [] Not Met: [] Adjusted  3. Patient will demonstrate an increase in postural awareness and control and activation of  Deep cervical stabilizers to allow for proper functional mobility as indicated by patients Functional Deficits. [] Progressing: [] Met: [] Not Met: [] Adjusted   4. Patient will return to sitting/computer work functional activities without increased symptoms or restriction. [] Progressing: [] Met: [] Not Met: [] Adjusted  5. Pt. To be able to sleep for 6 hours without waking due to neck/UE pain.(patient specific functional goal)   [] Progressing: [] Met: [] Not Met: [] Adjusted            Overall Progression Towards Functional goals/ Treatment Progress Update:  [] Patient is progressing as expected towards functional goals listed.     [] Progression is slowed due to complexities/Impairments listed. [] Progression has been slowed due to co-morbidities. [x] Plan just implemented, too soon to assess goals progression <30days   [] Goals require adjustment due to lack of progress  [] Patient is not progressing as expected and requires additional follow up with physician  [] Other    Prognosis for POC: [x] Good [] Fair  [] Poor      Patient requires continued skilled intervention: [x] Yes  [] No      ASSESSMENT:  pt. With improving cervical mobility as measured with passive mobilization techniques. Significant dec. In LS tension and restriction following IASTM today. Still having symptoms of neural tension in L UE but not as often. Cont. With progression of shoulder ROM, strength, posture ex. Scap stab and cervical mobility as jose. Treatment/Activity Tolerance:  [x] Patient tolerated treatment well [] Patient limited by fatique  [] Patient limited by pain  [] Patient limited by other medical complications  [] Other:     Prognosis: [x] Good [] Fair  [] Poor    Patient Requires Follow-up: [x] Yes  [] No    PLAN: See eval. Progress postural correction exercises as tolerated,  [x] Continue per plan of care [] Alter current plan (see comments above)  [] Plan of care initiated [] Hold pending MD visit [] Discharge      Electronically signed by:  Reza Colón, PT, ,MSPT, OMT-C [id number]         Note: If patient does not return for scheduled/ recommended follow up visits, this note will serve as a discharge from care along with most recent update on progress.

## 2023-02-06 ENCOUNTER — HOSPITAL ENCOUNTER (OUTPATIENT)
Dept: PHYSICAL THERAPY | Age: 48
Setting detail: THERAPIES SERIES
Discharge: HOME | End: 2023-02-06
Payer: MEDICAID

## 2023-02-06 PROCEDURE — G0283 ELEC STIM OTHER THAN WOUND: HCPCS | Performed by: PHYSICAL THERAPIST

## 2023-02-06 PROCEDURE — 97012 MECHANICAL TRACTION THERAPY: CPT | Performed by: PHYSICAL THERAPIST

## 2023-02-06 PROCEDURE — 97140 MANUAL THERAPY 1/> REGIONS: CPT | Performed by: PHYSICAL THERAPIST

## 2023-02-06 NOTE — FLOWSHEET NOTE
[facility], [address]  Phone: [phone]  Fax [phone]      Physical Therapy Treatment Note/ Progress Report:       Date:  2023    Patient Name:  Pam Valladares    :  1975  MRN: [id number]  Restrictions/Precautions:    Medical/Treatment Diagnosis Information:    M43.02 (ICD-10-CM) - Cervical spondylolysis   M41.9 (ICD-10-CM) - Scoliosis of thoracic spine, unspecified scoliosis type   Treatment diagnosis:Cervical pain V45.4     Insurance/Certification information:  PT Insurance Information: Hari Beltran  Physician Information:  Cheryle Knight MD  Has the plan of care been signed (Y/N):        []  Yes  [x]  No     Date of Patient follow up with Physician:     Is this a Progress Report:     []  Yes  [x]  No        If Yes:  Date Range for reporting period:  Beginning 23  Ending    Progress report will be due (10 Rx or 30 days whichever is less): 07       Recertification will be due (POC Duration  / 90 days whichever is less): 8 weeks        Visit # Insurance Allowable Auth Required   In-person 5  []  Yes []  No    Telehealth   []  Yes []  No    Total        Functional Scale: NDI 36%    Date assessed:  23   Therapy Diagnosis/Practice Pattern:F      Number of Comorbidities:  []0     [x]1-2    []3+     Latex Allergy:  [x]NO      []YES  Preferred Language for Healthcare:   [x]English       []other:    Pain level:  3-7/10     SUBJECTIVE:  pr. Reports that she felt better after session on Friday, but is not getting lasting effect, has not been able to try the home traction due to increased pain with trial. The R arm has been feeling weak and can't  or hold her purse. OBJECTIVE: See eval. Reviewed set -up and positioning of traction unit. Patient instructed to build up time and/or discontinue if painful.   Observation: TTP L LS, fascial restriction, middle scalene, elevation of rib 2 L  Test measurements:      RESTRICTIONS/PRECAUTIONS:     Exercises/Interventions:     Access Code: [email or web address]  URL: [email or web address]  Date: 01/24/2023  Prepared by: Rashmi Car    Exercises  Supine Cervical Retraction Passive Unloaded - 3-4 x daily - 7 x weekly - 3 sets - 10 reps  Seated Scapular Retraction - 3-4 x daily - 7 x weekly - 3 sets - 10 reps  Seated Upper Trapezius Stretch - 2 x daily - 7 x weekly - 1 sets - 5 reps - 10 hold  Seated Levator Scapulae Stretch on Wall - 2 x daily - 7 x weekly - 1 sets - 5 reps - 10 hold    Therapeutic Ex        Sets/sec Reps Notes         Scap retraction seated            UT stretch    Levator stretch at wall    Corner pec stretches low elbows E/F For HEP       FR pec stretches low-mid-high Use of long towel roll instead of FR   Seated no money exercise    Chin tuck-supine With towel roll under occiput   Supine L shoulder ER at 90 with band red                   Manual Intervention           Supine Manual cervical traction 8'     SOR 10'     IASTM L middle scalene 10'                 Cerv mobs/manip-SG's      Thoracic mobs/manip   In prone with cavitation noted   CT manip-seated lift Rib mobilizations supine rib 1 L, prone rib 2 L     IASTM prone L LS     Supine median n. Sliders L Starting in neutral with spine then R rotation   Chin tucks supine    1 pillow   C6-T1 Snags with L rotation and O.P       NMR re-education          Pt.  Edu for posture correction, ergonomics for computer use, HEP, prognosis, progression                            Traction                     Mechanical cervical traction-flexed 45 21/8# 10' 45/10              Therapeutic Exercise and NMR EXR  [x] (54652) Provided verbal/tactile cueing for activities related to strengthening, flexibility, endurance, ROM  for improvements in cervical, postural, scapular, scapulothoracic and UE control with self care, reaching, carrying, lifting, house/yardwork, driving/computer work.    [] (88240) Provided verbal/tactile cueing for activities related to improving balance, coordination, kinesthetic sense, posture, motor skill, proprioception  to assist with cervical, scapular, scapulothoracic and UE control with self care, reaching, carrying, lifting, house/yardwork, driving/computer work. Therapeutic Activities:    [] (34995 or 29903) Provided verbal/tactile cueing for activities related to improving balance, coordination, kinesthetic sense, posture, motor skill, proprioception and motor activation to allow for proper function of cervical, scapular, scapulothoracic and UE control with self care, carrying, lifting, driving/computer work.      Home Exercise Program:    [x] (97459) Reviewed/Progressed HEP activities related to strengthening, flexibility, endurance, ROM of cervical, scapular, scapulothoracic and UE control with self care, reaching, carrying, lifting, house/yardwork, driving/computer work  [] (61028) Reviewed/Progressed HEP activities related to improving balance, coordination, kinesthetic sense, posture, motor skill, proprioception of cervical, scapular, scapulothoracic and UE control with self care, reaching, carrying, lifting, house/yardwork, driving/computer work      Manual Treatments:  PROM / STM / Oscillations-Mobs:  G-I, II, III, IV (PA's, Inf., Post.)  [x] (65201) Provided manual therapy to mobilize soft tissue/joints of cervical/CT, scapular GHJ and UE for the purpose of decreasing headache, modulating pain, promoting relaxation,  increasing ROM, reducing/eliminating soft tissue swelling/inflammation/restriction, improving soft tissue extensibility and allowing for proper ROM for normal function with self care, reaching, carrying, lifting, house/yardwork, driving/computer work    Modalities:  PM ES to B neck/UT x15 min with MH    Charges  Timed Code Treatment Minutes: 40'   Total Treatment Minutes: 54'     Medicare total (add KX at $2000)         BWC time in/ time out:    TE time in /out    Manual time in/out    Neuro re ed time in/out    Untimed minutes          [] EVAL (LOW) 96164   [] EVAL (MOD) 18758   [] EVAL (HIGH) 85220   [] RE-EVAL     [] DD(35020) x  1   [] IONTO  [] NMR (98198) x 1   [] VASO  [x] Manual (73321) x 2   [] Other:  [] TA x      [x] Mech Traction (76049)  [] ES(attended) (28451)      [x] ES (un) (27223):     [place] stated goal: to be able to sleep without waking due to pain. [] Progressing: [] Met: [] Not Met: [] Adjusted     Therapist goals for Patient:   Short Term Goals: To be achieved in: 2 weeks  1. Independent in HEP and progression per patient tolerance, in order to prevent re-injury. [] Progressing: [] Met: [] Not Met: [] Adjusted  2. Patient will have a decrease in pain to facilitate improvement in movement, function, and ADLs as indicated by Functional Deficits. [] Progressing: [] Met: [] Not Met: [] Adjusted     Long Term Goals: To be achieved in: 8 weeks  1. Disability index score of 18% or less for the NDI to assist with reaching prior level of function. [] Progressing: [] Met: [] Not Met: [] Adjusted  2. Patient will demonstrate increased AROM to [facility] of cervical/thoracic spine to allow for proper joint functioning as indicated by patients Functional Deficits. [] Progressing: [] Met: [] Not Met: [] Adjusted  3. Patient will demonstrate an increase in postural awareness and control and activation of  Deep cervical stabilizers to allow for proper functional mobility as indicated by patients Functional Deficits. [] Progressing: [] Met: [] Not Met: [] Adjusted   4. Patient will return to sitting/computer work functional activities without increased symptoms or restriction. [] Progressing: [] Met: [] Not Met: [] Adjusted  5. Pt.  To be able to sleep for 6 hours without waking due to neck/UE pain.(patient specific functional goal)   [] Progressing: [] Met: [] Not Met: [] Adjusted            Overall Progression Towards Functional goals/ Treatment Progress Update:  [] Patient is progressing as expected towards functional goals listed. [] Progression is slowed due to complexities/Impairments listed. [] Progression has been slowed due to co-morbidities. [x] Plan just implemented, too soon to assess goals progression <30days   [] Goals require adjustment due to lack of progress  [] Patient is not progressing as expected and requires additional follow up with physician  [] Other    Prognosis for POC: [x] Good [] Fair  [] Poor      Patient requires continued skilled intervention: [x] Yes  [] No      ASSESSMENT:  pt. Reporting less arm fatigue following manual cervical traction and SOR today, and less muscle tension with IASTM to L middle scalene. Added mechanical cervical traction as well with good relief of R UE symptom during and after. Discussed further trial with home unit and to trial in supine with support under head into flexion, additionally discussed increased episodes of supine unloading and chin tuck and to hold on shoulder ex. For now and see if that has effect on current symptoms and centralizing. Treatment/Activity Tolerance:  [x] Patient tolerated treatment well [] Patient limited by fatique  [] Patient limited by pain  [] Patient limited by other medical complications  [] Other:     Prognosis: [x] Good [] Fair  [] Poor    Patient Requires Follow-up: [x] Yes  [] No    PLAN: Increase supine unloading with goal of q 2 hours, hold on shoulder ex. For now, trial with mechanical traction. [x] Continue per plan of care [] Alter current plan (see comments above)  [] Plan of care initiated [] Hold pending MD visit [] Discharge      Electronically signed by:  Deepak Alonso, PT, ,MSPT, OMT-C [id number]         Note: If patient does not return for scheduled/ recommended follow up visits, this note will serve as a discharge from care along with most recent update on progress.

## 2023-02-10 ENCOUNTER — HOSPITAL ENCOUNTER (OUTPATIENT)
Dept: PHYSICAL THERAPY | Age: 48
Setting detail: THERAPIES SERIES
Discharge: HOME | End: 2023-02-10
Payer: MEDICAID

## 2023-02-10 PROCEDURE — 97140 MANUAL THERAPY 1/> REGIONS: CPT | Performed by: PHYSICAL THERAPIST

## 2023-02-10 PROCEDURE — 97112 NEUROMUSCULAR REEDUCATION: CPT | Performed by: PHYSICAL THERAPIST

## 2023-02-10 NOTE — FLOWSHEET NOTE
[facility], [address]  Phone: [phone]  Fax [phone]      Physical Therapy Treatment Note/ Progress Report:       Date:  2/10/2023    Patient Name:  aMrgo Suh    :  1975  MRN: [id number]  Restrictions/Precautions:    Medical/Treatment Diagnosis Information:    M43.02 (ICD-10-CM) - Cervical spondylolysis   M41.9 (ICD-10-CM) - Scoliosis of thoracic spine, unspecified scoliosis type   Treatment diagnosis:Cervical pain C34.3     Insurance/Certification information:  PT Insurance Information: Jamshid Posadas  Physician Information:  Narciso Rinne, MD  Has the plan of care been signed (Y/N):        []  Yes  [x]  No     Date of Patient follow up with Physician:     Is this a Progress Report:     []  Yes  [x]  No        If Yes:  Date Range for reporting period:  Beginning 23  Ending    Progress report will be due (10 Rx or 30 days whichever is less):        Recertification will be due (POC Duration  / 90 days whichever is less): 8 weeks        Visit # Insurance Allowable Auth Required   In-person 6  []  Yes []  No    Telehealth   []  Yes []  No    Total        Functional Scale: NDI 36%    Date assessed:  23   Therapy Diagnosis/Practice Pattern:F      Number of Comorbidities:  []0     [x]1-2    []3+     Latex Allergy:  [x]NO      []YES  Preferred Language for Healthcare:   [x]English       []other:    Pain level:  3-7/10     SUBJECTIVE:  pt. Reports that she has been feeling better, in terms of R UE pain, the L side of neck is just staying tight and limiting turning head to L.      OBJECTIVE: See eval. Reviewed set -up and positioning of traction unit. Patient instructed to build up time and/or discontinue if painful.   Observation: TTP L LS, fascial restriction,  R middle scalene and pec minor restriction  Test measurements:      RESTRICTIONS/PRECAUTIONS:     Exercises/Interventions: Access Code: [email or web address]  URL: [email or web address]  Date: 01/24/2023  Prepared by: Vernell Cords    Exercises  Supine Cervical Retraction Passive Unloaded - 3-4 x daily - 7 x weekly - 3 sets - 10 reps  Seated Scapular Retraction - 3-4 x daily - 7 x weekly - 3 sets - 10 reps  Seated Upper Trapezius Stretch - 2 x daily - 7 x weekly - 1 sets - 5 reps - 10 hold  Seated Levator Scapulae Stretch on Wall - 2 x daily - 7 x weekly - 1 sets - 5 reps - 10 hold    Therapeutic Ex        Sets/sec Reps Notes         Scap retraction seated            UT stretch    Levator stretch at wall    Corner pec stretches low elbows E/F For HEP       FR pec stretches low-mid-high Use of long towel roll instead of FR   Seated no money exercise    Chin tuck-supine With towel roll under occiput   Supine L shoulder ER at 90 with band red                   Manual Intervention           Supine Manual cervical traction     SOR     IASTM R middle scalene 10'     IASTM L LS 6'     IASTM R pec minor 8'     Cerv mobs/manip-SG's            R median n, ulnar n. Sliders supine 1 10 ea    Thoracic mobs/manip   In prone with cavitation noted   CT manip-seated lift Rib mobilizations supine rib 1 L, prone rib 2 L              Chin tucks supine EOB  3 10 1 pillow   Supine cervical ext. With MWM at C 6-7 into SG R 1 5    PA's CTJ 5'    C6-T1 Snags with L rotation and O.P       NMR re-education          Pt. Edu for posture correction, ergonomics for computer use, HEP, prognosis, progression                            Traction                     Mechanical cervical traction-flexed 45 dnd21/8# 10' 45/10 Limited on time             Therapeutic Exercise and NMR EXR  [x] (39880) Provided verbal/tactile cueing for activities related to strengthening, flexibility, endurance, ROM  for improvements in cervical, postural, scapular, scapulothoracic and UE control with self care, reaching, carrying, lifting, house/yardwork, driving/computer work.     [] (33645) Provided verbal/tactile cueing for activities related to improving balance, coordination, kinesthetic sense, posture, motor skill, proprioception  to assist with cervical, scapular, scapulothoracic and UE control with self care, reaching, carrying, lifting, house/yardwork, driving/computer work. Therapeutic Activities:    [] (20788 or 01208) Provided verbal/tactile cueing for activities related to improving balance, coordination, kinesthetic sense, posture, motor skill, proprioception and motor activation to allow for proper function of cervical, scapular, scapulothoracic and UE control with self care, carrying, lifting, driving/computer work.      Home Exercise Program:    [x] (03064) Reviewed/Progressed HEP activities related to strengthening, flexibility, endurance, ROM of cervical, scapular, scapulothoracic and UE control with self care, reaching, carrying, lifting, house/yardwork, driving/computer work  [] (88242) Reviewed/Progressed HEP activities related to improving balance, coordination, kinesthetic sense, posture, motor skill, proprioception of cervical, scapular, scapulothoracic and UE control with self care, reaching, carrying, lifting, house/yardwork, driving/computer work      Manual Treatments:  PROM / STM / Oscillations-Mobs:  G-I, II, III, IV (PA's, Inf., Post.)  [x] (72037) Provided manual therapy to mobilize soft tissue/joints of cervical/CT, scapular GHJ and UE for the purpose of decreasing headache, modulating pain, promoting relaxation,  increasing ROM, reducing/eliminating soft tissue swelling/inflammation/restriction, improving soft tissue extensibility and allowing for proper ROM for normal function with self care, reaching, carrying, lifting, house/yardwork, driving/computer work    Modalities:  Limited on time  Charges  Timed Code Treatment Minutes: 40'   Total Treatment Minutes: 36'     Medicare total (add KX at $2000)         BWC time in/ time out:    TE time in /out    Manual time in/out    Neuro re ed time in/out    Untimed minutes          [] EVAL (LOW) 33907   [] EVAL (MOD) 97813   [] EVAL (HIGH) 70471   [] RE-EVAL     [] CA(15834) x  1   [] IONTO  [x] NMR (80120) x 1   [] VASO  [x] Manual (37677) x 2   [] Other:  [] TA x      [] Mech Traction (52904)  [] ES(attended) (01429)      [] ES (un) (55778):     Beola Hopping stated goal: to be able to sleep without waking due to pain. [] Progressing: [] Met: [] Not Met: [] Adjusted     Therapist goals for Patient:   Short Term Goals: To be achieved in: 2 weeks  1. Independent in HEP and progression per patient tolerance, in order to prevent re-injury. [] Progressing: [] Met: [] Not Met: [] Adjusted  2. Patient will have a decrease in pain to facilitate improvement in movement, function, and ADLs as indicated by Functional Deficits. [] Progressing: [] Met: [] Not Met: [] Adjusted     Long Term Goals: To be achieved in: 8 weeks  1. Disability index score of 18% or less for the NDI to assist with reaching prior level of function. [] Progressing: [] Met: [] Not Met: [] Adjusted  2. Patient will demonstrate increased AROM to [facility] of cervical/thoracic spine to allow for proper joint functioning as indicated by patients Functional Deficits. [] Progressing: [] Met: [] Not Met: [] Adjusted  3. Patient will demonstrate an increase in postural awareness and control and activation of  Deep cervical stabilizers to allow for proper functional mobility as indicated by patients Functional Deficits. [] Progressing: [] Met: [] Not Met: [] Adjusted   4. Patient will return to sitting/computer work functional activities without increased symptoms or restriction. [] Progressing: [] Met: [] Not Met: [] Adjusted  5. Pt.  To be able to sleep for 6 hours without waking due to neck/UE pain.(patient specific functional goal)   [] Progressing: [] Met: [] Not Met: [] Adjusted            Overall Progression Towards Functional goals/ Treatment Progress Update:  [] Patient is progressing as expected towards functional goals listed. [] Progression is slowed due to complexities/Impairments listed. [] Progression has been slowed due to co-morbidities. [x] Plan just implemented, too soon to assess goals progression <30days   [] Goals require adjustment due to lack of progress  [] Patient is not progressing as expected and requires additional follow up with physician  [] Other    Prognosis for POC: [x] Good [] Fair  [] Poor      Patient requires continued skilled intervention: [x] Yes  [] No      ASSESSMENT:  pt. With improved passive cervical rotation L following manuals, still restricted with end range cervical ext. Less R UE symptom following ulnar n sliders and IASTM to pec minor/scalene    Treatment/Activity Tolerance:  [x] Patient tolerated treatment well [] Patient limited by fatique  [] Patient limited by pain  [] Patient limited by other medical complications  [] Other:     Prognosis: [x] Good [] Fair  [] Poor    Patient Requires Follow-up: [x] Yes  [] No    PLAN: Pt. Responding well with STW today and neural sliders. Still limited with L rotation cervical, but improved following manuals. [x] Continue per plan of care [] Alter current plan (see comments above)  [] Plan of care initiated [] Hold pending MD visit [] Discharge      Electronically signed by:  Kanika Mckeon, PT, ,MSPT, OMT-C [id number]         Note: If patient does not return for scheduled/ recommended follow up visits, this note will serve as a discharge from care along with most recent update on progress.

## 2023-02-13 ENCOUNTER — HOSPITAL ENCOUNTER (OUTPATIENT)
Dept: PHYSICAL THERAPY | Age: 48
Setting detail: THERAPIES SERIES
Discharge: HOME | End: 2023-02-13
Payer: MEDICAID

## 2023-02-13 PROCEDURE — 97112 NEUROMUSCULAR REEDUCATION: CPT | Performed by: PHYSICAL THERAPIST

## 2023-02-13 PROCEDURE — G0283 ELEC STIM OTHER THAN WOUND: HCPCS | Performed by: PHYSICAL THERAPIST

## 2023-02-13 PROCEDURE — 97140 MANUAL THERAPY 1/> REGIONS: CPT | Performed by: PHYSICAL THERAPIST

## 2023-02-13 NOTE — FLOWSHEET NOTE
[facility], [address]  Phone: [phone]  Fax [phone]      Physical Therapy Treatment Note/ Progress Report:       Date:  2023    Patient Name:  Alexi Abrams    :  1975  MRN: [id number]  Restrictions/Precautions:    Medical/Treatment Diagnosis Information:    M43.02 (ICD-10-CM) - Cervical spondylolysis   M41.9 (ICD-10-CM) - Scoliosis of thoracic spine, unspecified scoliosis type   Treatment diagnosis:Cervical pain U24.1     Insurance/Certification information:  PT Insurance Information: Pedro Velasquez  Physician Information:  Taco Burris MD  Has the plan of care been signed (Y/N):        []  Yes  [x]  No     Date of Patient follow up with Physician: none scheduled    Is this a Progress Report:     []  Yes  [x]  No        If Yes:  Date Range for reporting period:  Beginning 23  Ending    Progress report will be due (10 Rx or 30 days whichever is less):        Recertification will be due (POC Duration  / 90 days whichever is less): 8 weeks        Visit # Insurance Allowable Auth Required   In-person 7  []  Yes []  No    Telehealth   []  Yes []  No    Total        Functional Scale: NDI 36%    Date assessed:  23   Therapy Diagnosis/Practice Pattern:F      Number of Comorbidities:  []0     [x]1-2    []3+     Latex Allergy:  [x]NO      []YES  Preferred Language for Healthcare:   [x]English       []other:    Pain level:  3-710     SUBJECTIVE:  pt. Reports that she did not have any numbness on Friday night, or maybe just a little bit one time. Overall is feeling much better, the neck is comfortable at present, no pain in the front of R shoulder (pec minor). OBJECTIVE: See eval. Reviewed set -up and positioning of traction unit. Patient instructed to build up time and/or discontinue if painful.   Observation: TTP L LS, fascial restriction,  R middle scalene and pec minor restriction  Test measurements:      RESTRICTIONS/PRECAUTIONS:     Exercises/Interventions:     Access Code: [email or web address]  URL: [email or web address]  Date: 01/24/2023  Prepared by: Anushka Ernst    Exercises  Supine Cervical Retraction Passive Unloaded - 3-4 x daily - 7 x weekly - 3 sets - 10 reps  Seated Scapular Retraction - 3-4 x daily - 7 x weekly - 3 sets - 10 reps  Seated Upper Trapezius Stretch - 2 x daily - 7 x weekly - 1 sets - 5 reps - 10 hold  Seated Levator Scapulae Stretch on Wall - 2 x daily - 7 x weekly - 1 sets - 5 reps - 10 hold    Therapeutic Ex        Sets/sec Reps Notes         Scap retraction seated            UT stretch :10 5    Levator stretch at wall :10 5    Corner pec stretches low elbows E/F 1 10 ea For HEP       FR pec stretches low-mid-high 3' Use of long towel roll instead of FR   Seated no money exercise    Chin tuck-supine With towel roll under occiput   Supine L shoulder ER at 90 with band red                   Manual Intervention           Supine Manual cervical traction     SOR     IASTM R middle scalene 10'     IASTM L LS 6'     IASTM R pec minor 8'     Cerv mobs/manip-SG's 3'           R median n, ulnar n. Sliders supine 1 10 ea No tension today   Thoracic mobs/manip   In prone with cavitation noted   CT manip-seated lift Rib mobilizations supine rib 1 L, prone rib 2 L              Chin tucks supine EOB  1 pillow   Supine cervical ext. With MWM at C 6-7 into SG R    PA's CTJ 5'    C6-T1 Snags with L rotation and O.P       NMR re-education          Pt.  Edu for posture correction, ergonomics for computer use, HEP, prognosis, progression          Prone I, T, unable Y 1 10 ea                Traction                     Mechanical cervical traction-flexed 45 dnd21/8# 10' 45/10 Limited on time             Therapeutic Exercise and NMR EXR  [x] (59465) Provided verbal/tactile cueing for activities related to strengthening, flexibility, endurance, ROM  for improvements in cervical, postural, scapular, scapulothoracic and UE control with self care, reaching, carrying, lifting, house/yardwork, driving/computer work.    [] (25920) Provided verbal/tactile cueing for activities related to improving balance, coordination, kinesthetic sense, posture, motor skill, proprioception  to assist with cervical, scapular, scapulothoracic and UE control with self care, reaching, carrying, lifting, house/yardwork, driving/computer work. Therapeutic Activities:    [] (87017 or 54558) Provided verbal/tactile cueing for activities related to improving balance, coordination, kinesthetic sense, posture, motor skill, proprioception and motor activation to allow for proper function of cervical, scapular, scapulothoracic and UE control with self care, carrying, lifting, driving/computer work.      Home Exercise Program:    [x] (50538) Reviewed/Progressed HEP activities related to strengthening, flexibility, endurance, ROM of cervical, scapular, scapulothoracic and UE control with self care, reaching, carrying, lifting, house/yardwork, driving/computer work  [] (38591) Reviewed/Progressed HEP activities related to improving balance, coordination, kinesthetic sense, posture, motor skill, proprioception of cervical, scapular, scapulothoracic and UE control with self care, reaching, carrying, lifting, house/yardwork, driving/computer work      Manual Treatments:  PROM / STM / Oscillations-Mobs:  G-I, II, III, IV (PA's, Inf., Post.)  [x] (06665) Provided manual therapy to mobilize soft tissue/joints of cervical/CT, scapular GHJ and UE for the purpose of decreasing headache, modulating pain, promoting relaxation,  increasing ROM, reducing/eliminating soft tissue swelling/inflammation/restriction, improving soft tissue extensibility and allowing for proper ROM for normal function with self care, reaching, carrying, lifting, house/yardwork, driving/computer work    Modalities:  PM ES to B neck/UT x15 min with [facility]  Limited on time  Charges  Timed Code Treatment Minutes: 36'   Total Treatment Minutes: 54'     Medicare total (add KX at $2000)         BWC time in/ time out:    TE time in /out    Manual time in/out    Neuro re ed time in/out    Untimed minutes          [] EVAL (LOW) 82226   [] EVAL (MOD) 92308   [] EVAL (HIGH) 37059   [] RE-EVAL     [] RF(72903) x  1   [] IONTO  [x] NMR (17146) x 1   [] VASO  [x] Manual (57971) x 2   [] Other:  [] TA x      [] Mech Traction (16698)  [] ES(attended) (60411)      [x] ES (un) (36807):     Vikas Rodriguez stated goal: to be able to sleep without waking due to pain. [] Progressing: [] Met: [] Not Met: [] Adjusted     Therapist goals for Patient:   Short Term Goals: To be achieved in: 2 weeks  1. Independent in HEP and progression per patient tolerance, in order to prevent re-injury. [] Progressing: [] Met: [] Not Met: [] Adjusted  2. Patient will have a decrease in pain to facilitate improvement in movement, function, and ADLs as indicated by Functional Deficits. [] Progressing: [] Met: [] Not Met: [] Adjusted     Long Term Goals: To be achieved in: 8 weeks  1. Disability index score of 18% or less for the NDI to assist with reaching prior level of function. [] Progressing: [] Met: [] Not Met: [] Adjusted  2. Patient will demonstrate increased AROM to [facility] of cervical/thoracic spine to allow for proper joint functioning as indicated by patients Functional Deficits. [] Progressing: [] Met: [] Not Met: [] Adjusted  3. Patient will demonstrate an increase in postural awareness and control and activation of  Deep cervical stabilizers to allow for proper functional mobility as indicated by patients Functional Deficits. [] Progressing: [] Met: [] Not Met: [] Adjusted   4. Patient will return to sitting/computer work functional activities without increased symptoms or restriction. [] Progressing: [] Met: [] Not Met: [] Adjusted  5. Pt.  To be able to sleep for 6 hours without waking due to neck/UE pain.(patient specific functional goal)   [] Progressing: [] Met: [] Not Met: [] Adjusted            Overall Progression Towards Functional goals/ Treatment Progress Update:  [] Patient is progressing as expected towards functional goals listed. [] Progression is slowed due to complexities/Impairments listed. [] Progression has been slowed due to co-morbidities. [x] Plan just implemented, too soon to assess goals progression <30days   [] Goals require adjustment due to lack of progress  [] Patient is not progressing as expected and requires additional follow up with physician  [] Other    Prognosis for POC: [x] Good [] Fair  [] Poor      Patient requires continued skilled intervention: [x] Yes  [] No      ASSESSMENT:  pt. With less frequent tingling in R UE, less tenderness with palpation of R pec minor, less neural tension with ulnar n. Tension test today. Able to resume some strength ex and stretching without issue. Cont. With skilled PT to cont. To address deficits and normalize function. Treatment/Activity Tolerance:  [x] Patient tolerated treatment well [] Patient limited by fatique  [] Patient limited by pain  [] Patient limited by other medical complications  [] Other:     Prognosis: [x] Good [] Fair  [] Poor    Patient Requires Follow-up: [x] Yes  [] No    PLAN: Pt. Responding well with STW today and neural sliders. Still limited with L rotation cervical, but improved following manuals. [x] Continue per plan of care [] Alter current plan (see comments above)  [] Plan of care initiated [] Hold pending MD visit [] Discharge      Electronically signed by:  Giana Zuluaga, PT, ,MSPT, OMT-C [id number]         Note: If patient does not return for scheduled/ recommended follow up visits, this note will serve as a discharge from care along with most recent update on progress.

## 2023-02-21 ENCOUNTER — HOSPITAL ENCOUNTER (OUTPATIENT)
Dept: PHYSICAL THERAPY | Age: 48
Setting detail: THERAPIES SERIES
Discharge: HOME | End: 2023-02-21
Payer: MEDICAID

## 2023-02-21 PROCEDURE — 97112 NEUROMUSCULAR REEDUCATION: CPT | Performed by: PHYSICAL THERAPIST

## 2023-02-21 PROCEDURE — G0283 ELEC STIM OTHER THAN WOUND: HCPCS | Performed by: PHYSICAL THERAPIST

## 2023-02-21 PROCEDURE — 97140 MANUAL THERAPY 1/> REGIONS: CPT | Performed by: PHYSICAL THERAPIST

## 2023-02-21 NOTE — FLOWSHEET NOTE
[facility], [address] [place]  Phone: [phone]  Fax [phone]      Physical Therapy Treatment Note/ Progress Report:       Date:  2023    Patient Name:  Joseph Pack    :  1975  MRN: [id number]  Restrictions/Precautions:    Medical/Treatment Diagnosis Information:    M43.02 (ICD-10-CM) - Cervical spondylolysis   M41.9 (ICD-10-CM) - Scoliosis of thoracic spine, unspecified scoliosis type   Treatment diagnosis:Cervical pain U10.3     Insurance/Certification information:  PT Insurance Information: Dionne Ho  Physician Information:  Agustina Perez MD  Has the plan of care been signed (Y/N):        []  Yes  [x]  No     Date of Patient follow up with Physician: none scheduled    Is this a Progress Report:     []  Yes  [x]  No        If Yes:  Date Range for reporting period:  Beginning 23  Ending    Progress report will be due (10 Rx or 30 days whichever is less):        Recertification will be due (POC Duration  / 90 days whichever is less): 8 weeks        Visit # Insurance Allowable Auth Required   In-person 8  []  Yes []  No    Telehealth   []  Yes []  No    Total        Functional Scale: NDI 36%    Date assessed:  23   Therapy Diagnosis/Practice Pattern:F      Number of Comorbidities:  []0     [x]1-2    []3+     Latex Allergy:  [x]NO      []YES  Preferred Language for Healthcare:   [x]English       []other:    Pain level:  3-10     SUBJECTIVE:  pt. Reports that she has had onset of R shoulder pain with lying on R side at night and can reposition to decrease, also notices the pain with reaching across to the L. The L side of the neck is still tight (LS), the B neck in sore and hurting now. OBJECTIVE: See eval. Reviewed set -up and positioning of traction unit. Patient instructed to build up time and/or discontinue if painful.   Observation:TTP R pec minor, tension over R rib 1, slight elevation, slight HA reported that decreased with elevating head in supine. Test measurements:  tight shoulder flexion R>L    RESTRICTIONS/PRECAUTIONS:     Exercises/Interventions:     Access Code: [email or web address]  URL: [email or web address]  Date: 01/24/2023  Prepared by: Veronique Yang    Exercises  Supine Cervical Retraction Passive Unloaded - 3-4 x daily - 7 x weekly - 3 sets - 10 reps  Seated Scapular Retraction - 3-4 x daily - 7 x weekly - 3 sets - 10 reps  Seated Upper Trapezius Stretch - 2 x daily - 7 x weekly - 1 sets - 5 reps - 10 hold  Seated Levator Scapulae Stretch on Wall - 2 x daily - 7 x weekly - 1 sets - 5 reps - 10 hold    Therapeutic Ex        Sets/sec Reps Notes         Scap retraction seated        Cane Flexion wide  :10 10    UT stretch    Levator stretch at wall    Corner pec stretches low elbows E/F For HEP       FR pec stretches low-mid-high Use of long towel roll instead of FR   Seated no money exercise    Chin tuck-supine With towel roll under occiput   Supine L shoulder ER at 90 with band red                   Manual Intervention           Supine Manual cervical traction     SOR     IASTM R middle scalene, pec minor release, post glide R shoulder, PROM R shoulder 10'     IASTM L LS 6'         Cerv mobs/-SG's, PA's C7/T1, C6-7 4'           R median n, ulnar n. Sliders supine No tension today   Thoracic mobs/manip   In prone with cavitation noted   CT manip-seated lift Rib mobilizations supine rib 1 L, prone rib 2 L              Chin tucks supine EOB  1 pillow   Supine cervical ext. With MWM at C 6-7 into SG R    PA's CTJ 5'    C6-T1 Snags with L rotation and O.P       NMR re-education          Pt.  Edu for posture correction, ergonomics for computer use, HEP, prognosis, progression          Prone I, T, unable Y    Supine TB ER at 90 1 15 B red   Supine hoz abd 1 15 red   Supine B shoulder ext with band anchored above 1 15 red   Supine D2 flexion 1 15B red         Traction Mechanical cervical traction-flexed 45 dnd21/8# 10' 45/10 Limited on time             Therapeutic Exercise and NMR EXR  [x] (55956) Provided verbal/tactile cueing for activities related to strengthening, flexibility, endurance, ROM  for improvements in cervical, postural, scapular, scapulothoracic and UE control with self care, reaching, carrying, lifting, house/yardwork, driving/computer work.    [] (25864) Provided verbal/tactile cueing for activities related to improving balance, coordination, kinesthetic sense, posture, motor skill, proprioception  to assist with cervical, scapular, scapulothoracic and UE control with self care, reaching, carrying, lifting, house/yardwork, driving/computer work. Therapeutic Activities:    [] (90477 or 20199) Provided verbal/tactile cueing for activities related to improving balance, coordination, kinesthetic sense, posture, motor skill, proprioception and motor activation to allow for proper function of cervical, scapular, scapulothoracic and UE control with self care, carrying, lifting, driving/computer work.      Home Exercise Program:    [x] (96255) Reviewed/Progressed HEP activities related to strengthening, flexibility, endurance, ROM of cervical, scapular, scapulothoracic and UE control with self care, reaching, carrying, lifting, house/yardwork, driving/computer work  [] (65549) Reviewed/Progressed HEP activities related to improving balance, coordination, kinesthetic sense, posture, motor skill, proprioception of cervical, scapular, scapulothoracic and UE control with self care, reaching, carrying, lifting, house/yardwork, driving/computer work      Manual Treatments:  PROM / STM / Oscillations-Mobs:  G-I, II, III, IV (PA's, Inf., Post.)  [x] (04968) Provided manual therapy to mobilize soft tissue/joints of cervical/CT, scapular GHJ and UE for the purpose of decreasing headache, modulating pain, promoting relaxation,  increasing ROM, reducing/eliminating soft tissue swelling/inflammation/restriction, improving soft tissue extensibility and allowing for proper ROM for normal function with self care, reaching, carrying, lifting, house/yardwork, driving/computer work    Modalities:  PM ES to B neck/UT x15 min with MH  Limited on time  Charges  Timed Code Treatment Minutes: 43'   Total Treatment Minutes: 62'     Medicare total (add KX at $2000)         BWC time in/ time out:    TE time in /out    Manual time in/out    Neuro re ed time in/out    Untimed minutes          [] EVAL (LOW) 52530   [] EVAL (MOD) 23084   [] EVAL (HIGH) 80267   [] RE-EVAL     [] XT(74183) x  1   [] IONTO  [x] NMR (80272) x 1   [] VASO  [x] Manual (20084) x 2   [] Other:  [] TA x      [] Mech Traction (22756)  [] ES(attended) (10761)      [x] ES (un) (38771):     Jenae Shaikh stated goal: to be able to sleep without waking due to pain. [] Progressing: [] Met: [] Not Met: [] Adjusted     Therapist goals for Patient:   Short Term Goals: To be achieved in: 2 weeks  1. Independent in HEP and progression per patient tolerance, in order to prevent re-injury. [] Progressing: [] Met: [] Not Met: [] Adjusted  2. Patient will have a decrease in pain to facilitate improvement in movement, function, and ADLs as indicated by Functional Deficits. [] Progressing: [] Met: [] Not Met: [] Adjusted     Long Term Goals: To be achieved in: 8 weeks  1. Disability index score of 18% or less for the NDI to assist with reaching prior level of function. [] Progressing: [] Met: [] Not Met: [] Adjusted  2. Patient will demonstrate increased AROM to [facility] of cervical/thoracic spine to allow for proper joint functioning as indicated by patients Functional Deficits. [] Progressing: [] Met: [] Not Met: [] Adjusted  3.  Patient will demonstrate an increase in postural awareness and control and activation of  Deep cervical stabilizers to allow for proper functional mobility as indicated by patients Functional Deficits. [] Progressing: [] Met: [] Not Met: [] Adjusted   4. Patient will return to sitting/computer work functional activities without increased symptoms or restriction. [] Progressing: [] Met: [] Not Met: [] Adjusted  5. Pt. To be able to sleep for 6 hours without waking due to neck/UE pain.(patient specific functional goal)   [] Progressing: [] Met: [] Not Met: [] Adjusted            Overall Progression Towards Functional goals/ Treatment Progress Update:  [] Patient is progressing as expected towards functional goals listed. [] Progression is slowed due to complexities/Impairments listed. [] Progression has been slowed due to co-morbidities. [x] Plan just implemented, too soon to assess goals progression <30days   [] Goals require adjustment due to lack of progress  [] Patient is not progressing as expected and requires additional follow up with physician  [] Other    Prognosis for POC: [x] Good [] Fair  [] Poor      Patient requires continued skilled intervention: [x] Yes  [] No      ASSESSMENT:  pt. Cont. To make improvements with less frequent R UE tingling, not waking q 30 min at night. Less tension and stiffness noted with PA's at C7-T1, still dec PA mobility at C6-7, and dec SG at same level. Adjusted ex to supine today in effort to not overdo it with R shoulder and due to light HA today. Pt. Responding well with IASTM , cervical mobs and supine scap stab and RTC strength ex. Pain with cervical physiological locking L, no pain with non phys. Locking. Cont. With skilled PT to further progress to pain free function.       Treatment/Activity Tolerance:  [x] Patient tolerated treatment well [] Patient limited by fatique  [] Patient limited by pain  [] Patient limited by other medical complications  [] Other:     Prognosis: [x] Good [] Fair  [] Poor    Patient Requires Follow-up: [x] Yes  [] No    PLAN: Look into orders for TDN  [x] Continue per plan of care [] Alter current plan (see comments above)  [] Plan of care initiated [] Hold pending MD visit [] Discharge      Electronically signed by:  Ayse Mckenzie, PT, ,MSPT, OMT-C [id number]         Note: If patient does not return for scheduled/ recommended follow up visits, this note will serve as a discharge from care along with most recent update on progress.

## 2023-02-24 ENCOUNTER — HOSPITAL ENCOUNTER (OUTPATIENT)
Dept: PHYSICAL THERAPY | Age: 48
Setting detail: THERAPIES SERIES
Discharge: HOME | End: 2023-02-24
Payer: MEDICAID

## 2023-02-24 PROCEDURE — 97110 THERAPEUTIC EXERCISES: CPT | Performed by: PHYSICAL THERAPIST

## 2023-02-24 PROCEDURE — 97140 MANUAL THERAPY 1/> REGIONS: CPT | Performed by: PHYSICAL THERAPIST

## 2023-02-24 PROCEDURE — G0283 ELEC STIM OTHER THAN WOUND: HCPCS | Performed by: PHYSICAL THERAPIST

## 2023-02-24 NOTE — FLOWSHEET NOTE
[facility], [address]  Phone: [phone]  Fax [phone]      Physical Therapy Treatment Note/ Progress Report:       Date:  2023    Patient Name:  Angelika Olivia    :  1975  MRN: [id number]  Restrictions/Precautions:    Medical/Treatment Diagnosis Information:    M43.02 (ICD-10-CM) - Cervical spondylolysis   M41.9 (ICD-10-CM) - Scoliosis of thoracic spine, unspecified scoliosis type   Treatment diagnosis:Cervical pain Z15.7     Insurance/Certification information:  PT Insurance Information: Briana Johnson  Physician Information:  Brian Oro MD  Has the plan of care been signed (Y/N):        []  Yes  [x]  No     Date of Patient follow up with Physician: none scheduled    Is this a Progress Report:     [x]  Yes  []  No        If Yes:  Date Range for reporting period:  Beginning 23  Ending 23    Progress report will be due (10 Rx or 30 days whichever is less):        Recertification will be due (POC Duration  / 90 days whichever is less): 8 weeks from 24       Visit # Insurance Allowable Auth Required   In-person 9  []  Yes []  No    Telehealth   []  Yes []  No    Total        Functional Scale: NDI 36%    Date assessed:  23   Therapy Diagnosis/Practice Pattern:F      Number of Comorbidities:  []0     [x]1-2    []3+     Latex Allergy:  [x]NO      []YES  Preferred Language for Healthcare:   [x]English       []other:    Pain level:  3-7/10     SUBJECTIVE:  pt. Reports that she has had onset of R shoulder pain with lying on R side at night and can reposition to decrease, also notices the pain with reaching across to the L. The L side of the neck is still tight (LS), the B neck in sore and hurting now. OBJECTIVE: See eval. Reviewed set -up and positioning of traction unit. Patient instructed to build up time and/or discontinue if painful.   Observation:TTP R pec minor, tension over R rib 1, slight elevation, slight HA reported that decreased with elevating head in supine. Test measurements:  cervical Flexion 45, extension 30, after manuals and manip, Flexion 55, ext 40, pain free Passive cervical rotation/SB, pt. Able to rotate pain free L if retracts first    RESTRICTIONS/PRECAUTIONS:     Exercises/Interventions:     Access Code: [email or web address]  URL: [email or web address]  Date: 01/24/2023  Prepared by: Christina Posey    Exercises  Supine Cervical Retraction Passive Unloaded - 3-4 x daily - 7 x weekly - 3 sets - 10 reps  Seated Scapular Retraction - 3-4 x daily - 7 x weekly - 3 sets - 10 reps  Seated Upper Trapezius Stretch - 2 x daily - 7 x weekly - 1 sets - 5 reps - 10 hold  Seated Levator Scapulae Stretch on Wall - 2 x daily - 7 x weekly - 1 sets - 5 reps - 10 hold    Therapeutic Ex        Sets/sec Reps Notes         Scap retraction seated        Cane Flexion wide  :10 10    UT stretch    Levator stretch at wall    Corner pec stretches low elbows E/F For HEP       FR pec stretches low-mid-high Use of long towel roll instead of FR   Seated no money exercise    Chin tuck-supine With towel roll under occiput   Supine L shoulder ER at 90 with band red                   Manual Intervention           Supine Manual cervical traction     SOR 7'     IASTM R middle scalene, 5'  seated   IASTM L LS 6'  seated   Cupping L LS 6'         Cerv mobs/-SG's, PA's C7/T1, C6-7 2'           R median n, ulnar n. Sliders supine No tension today   Thoracic mobs/manip   In prone with cavitation noted   CT manip-seated lift X  cavitation   Rib mobilizations supine rib 1 L, prone rib 2 L              Chin tucks supine with pillow  3 10 1 pillow   Supine cervical ext. With MWM at C 6-7 into SG R    PA's CTJ 2'    C6-T1 Snags with L rotation and O.P       NMR re-education          Pt.  Edu for posture correction, ergonomics for computer use, HEP, prognosis, progression          Prone I, T, unable Y    Supine TB ER at 90 2 15 B red   Supine hoz abd 2 15 red   Supine B shoulder ext with band anchored above 2 15 red   Supine D2 flexion 2 15B red         Traction                     Mechanical cervical traction-flexed 45 dnd21/8# 10' 45/10 Limited on time             Therapeutic Exercise and NMR EXR  [x] (05413) Provided verbal/tactile cueing for activities related to strengthening, flexibility, endurance, ROM  for improvements in cervical, postural, scapular, scapulothoracic and UE control with self care, reaching, carrying, lifting, house/yardwork, driving/computer work.    [] (01262) Provided verbal/tactile cueing for activities related to improving balance, coordination, kinesthetic sense, posture, motor skill, proprioception  to assist with cervical, scapular, scapulothoracic and UE control with self care, reaching, carrying, lifting, house/yardwork, driving/computer work. Therapeutic Activities:    [] (32763 or 15090) Provided verbal/tactile cueing for activities related to improving balance, coordination, kinesthetic sense, posture, motor skill, proprioception and motor activation to allow for proper function of cervical, scapular, scapulothoracic and UE control with self care, carrying, lifting, driving/computer work.      Home Exercise Program:    [x] (99070) Reviewed/Progressed HEP activities related to strengthening, flexibility, endurance, ROM of cervical, scapular, scapulothoracic and UE control with self care, reaching, carrying, lifting, house/yardwork, driving/computer work  [] (56572) Reviewed/Progressed HEP activities related to improving balance, coordination, kinesthetic sense, posture, motor skill, proprioception of cervical, scapular, scapulothoracic and UE control with self care, reaching, carrying, lifting, house/yardwork, driving/computer work      Manual Treatments:  PROM / STM / Oscillations-Mobs:  G-I, II, III, IV (PA's, Inf., Post.)  [x] (24211) Provided manual therapy to mobilize soft tissue/joints of cervical/CT, scapular GHJ and UE for the purpose of decreasing headache, modulating pain, promoting relaxation,  increasing ROM, reducing/eliminating soft tissue swelling/inflammation/restriction, improving soft tissue extensibility and allowing for proper ROM for normal function with self care, reaching, carrying, lifting, house/yardwork, driving/computer work    Modalities:  PM ES to B neck/UT x15 min with   Limited on time  Charges  Timed Code Treatment Minutes: 35   Total Treatment Minutes: 54'     Medicare total (add KX at $2000)         [facility] time in/ time out:    TE time in /out    Manual time in/out    Neuro re ed time in/out    Untimed minutes          [] EVAL (LOW) 11998   [] EVAL (MOD) 99231   [] EVAL (HIGH) 09247   [] RE-EVAL     [x] DY(32633) x  1   [] IONTO  [] NMR (08996) x 1   [] VASO  [x] Manual (47711) x 1   [] Other:  [] TA x      [] Mech Traction (85824)  [] ES(attended) (20438)      [x] ES (un) (06882):     Sonya Morrissey stated goal: to be able to sleep without waking due to pain. [] Progressing: [] Met: [] Not Met: [] Adjusted     Therapist goals for Patient:   Short Term Goals: To be achieved in: 2 weeks  1. Independent in HEP and progression per patient tolerance, in order to prevent re-injury. [] Progressing: [] Met: [] Not Met: [] Adjusted  2. Patient will have a decrease in pain to facilitate improvement in movement, function, and ADLs as indicated by Functional Deficits. [] Progressing: [] Met: [] Not Met: [] Adjusted     Long Term Goals: To be achieved in: 8 weeks  1. Disability index score of 18% or less for the NDI to assist with reaching prior level of function. [] Progressing: [] Met: [] Not Met: [] Adjusted  2. Patient will demonstrate increased AROM to [facility] of cervical/thoracic spine to allow for proper joint functioning as indicated by patients Functional Deficits. [] Progressing: [] Met: [] Not Met: [] Adjusted  3.  Patient will demonstrate an increase in postural awareness and control and activation of  Deep cervical stabilizers to allow for proper functional mobility as indicated by patients Functional Deficits. [] Progressing: [] Met: [] Not Met: [] Adjusted   4. Patient will return to sitting/computer work functional activities without increased symptoms or restriction. [] Progressing: [] Met: [] Not Met: [] Adjusted  5. Pt. To be able to sleep for 6 hours without waking due to neck/UE pain.(patient specific functional goal)   [] Progressing: [] Met: [] Not Met: [] Adjusted            Overall Progression Towards Functional goals/ Treatment Progress Update:  [] Patient is progressing as expected towards functional goals listed. [] Progression is slowed due to complexities/Impairments listed. [] Progression has been slowed due to co-morbidities. [x] Plan just implemented, too soon to assess goals progression <30days   [] Goals require adjustment due to lack of progress  [] Patient is not progressing as expected and requires additional follow up with physician  [] Other    Prognosis for POC: [x] Good [] Fair  [] Poor      Patient requires continued skilled intervention: [x] Yes  [] No      ASSESSMENT:  pt. Without tenderness following manuals, improved cervical ROM all planes without pain, able to actively rotate L without pain with retracting first.  No UE pain, or numbness. Pt. Could benefit from cont. PT for trial of TDN, vs, continued strategies, instructed her to contact MD re:imaging. Treatment/Activity Tolerance:  [x] Patient tolerated treatment well [] Patient limited by fatique  [] Patient limited by pain  [] Patient limited by other medical complications  [] Other:     Prognosis: [x] Good [] Fair  [] Poor    Patient Requires Follow-up: [x] Yes  [] No    PLAN: Await orders for TDN/pt. To contact MD re imaging.   [x] Continue per plan of care [] Alter current plan (see comments above)  [] Plan of care initiated [] Hold pending MD visit [] Discharge      Electronically signed by:  Can Heart, PT, ,MSPT, OMT-C [id number]         Note: If patient does not return for scheduled/ recommended follow up visits, this note will serve as a discharge from care along with most recent update on progress.

## 2023-02-27 ENCOUNTER — HOSPITAL ENCOUNTER (OUTPATIENT)
Dept: PHYSICAL THERAPY | Age: 48
Setting detail: THERAPIES SERIES
Discharge: HOME | End: 2023-02-27
Payer: MEDICAID

## 2023-02-27 PROCEDURE — 97110 THERAPEUTIC EXERCISES: CPT | Performed by: PHYSICAL THERAPIST

## 2023-02-27 PROCEDURE — G0283 ELEC STIM OTHER THAN WOUND: HCPCS | Performed by: PHYSICAL THERAPIST

## 2023-02-27 PROCEDURE — 97140 MANUAL THERAPY 1/> REGIONS: CPT | Performed by: PHYSICAL THERAPIST

## 2023-02-27 NOTE — FLOWSHEET NOTE
[facility], [address]  Phone: [phone]  Fax [phone]      Physical Therapy Treatment Note/ Progress Report:       Date:  2023    Patient Name:  Michael Cisse    :  1975  MRN: [id number]  Restrictions/Precautions:    Medical/Treatment Diagnosis Information:    M43.02 (ICD-10-CM) - Cervical spondylolysis   M41.9 (ICD-10-CM) - Scoliosis of thoracic spine, unspecified scoliosis type   Treatment diagnosis:Cervical pain N62.3     Insurance/Certification information:  PT Insurance Information: Camelia Hawley  Physician Information:  Macrina Platt MD  Has the plan of care been signed (Y/N):        []  Yes  [x]  No     Date of Patient follow up with Physician: none scheduled    Is this a Progress Report:     [x]  Yes  []  No        If Yes:  Date Range for reporting period:  Beginning 23  Ending 23    Progress report will be due (10 Rx or 30 days whichever is less):        Recertification will be due (POC Duration  / 90 days whichever is less): 8 weeks from 24       Visit # Insurance Allowable Auth Required   In-person 10  []  Yes []  No    Telehealth   []  Yes []  No    Total        Functional Scale: NDI 36%    Date assessed:  23   Therapy Diagnosis/Practice Pattern:F      Number of Comorbidities:  []0     [x]1-2    []3+     Latex Allergy:  [x]NO      []YES  Preferred Language for Healthcare:   [x]English       []other:    Pain level:  3-710     SUBJECTIVE:  pt. Reports that she felt good after session on Friday, she did not have a good weekend, did some studying, cooking and normal job at [employer] and is having the regular neck pain/tension and the R arm numbness was present with sleeping last night. She received a message from MD office in regards to scheduling MRI. She states that she feels better after PT and would like to cont. With sessions. OBJECTIVE: pt.  Has not been using home traction due to causes pain. Observation:Tightness with palpation R pec minor, TTP over R 1st rib, L LS, R shoulder positioned more anterior in supine, dec post glide/inf glide R shoulder  Test measurements:  cervical Flexion 45, extension 30, after manuals and manip, Flexion 55, ext 40, pain free Passive cervical rotation/SB, pt. Able to rotate pain free L if retracts first    RESTRICTIONS/PRECAUTIONS:     Exercises/Interventions:     Access Code: [email or web address]  URL: [email or web address]  Date: 01/24/2023  Prepared by: Damon Banda    Exercises  Supine Cervical Retraction Passive Unloaded - 3-4 x daily - 7 x weekly - 3 sets - 10 reps  Seated Scapular Retraction - 3-4 x daily - 7 x weekly - 3 sets - 10 reps  Seated Upper Trapezius Stretch - 2 x daily - 7 x weekly - 1 sets - 5 reps - 10 hold  Seated Levator Scapulae Stretch on Wall - 2 x daily - 7 x weekly - 1 sets - 5 reps - 10 hold    Therapeutic Ex        Sets/sec Reps Notes         Scap retraction seated        Cane Flexion wide     UT stretch    Levator stretch at wall    Corner pec stretches low elbows E/F For HEP       FR pec stretches low-mid-high Use of long towel roll instead of FR   Seated no money exercise    Chin tuck-supine With towel roll under occiput   Supine L shoulder ER at 90 with band red                   Manual Intervention           Supine Manual cervical traction 2'         IASTM R middle scalene, pec minor release, sub scap release, post/inf glide R shoulder, PROM R shoulder 10'  seated   IASTM L LS 3'  seated   Cupping L LS  Did not do       Cerv mobs/-SG's, PA's C7/T1, C6-7  Did not do         R median n, ulnar n. Sliders supine No tension today   Thoracic mobs/manip   In prone with cavitation noted   CT manip-seated lift   cavitation   Rib mobilizations supine rib 1 L, prone rib 2 L              Chin tucks supine with pillow  1 pillow   Supine cervical ext.  With MWM at C 6-7 into SG R    PA's CTJ    C6-T1 Snags with L rotation and O.P       NMR re-education          Pt. Edu for posture correction, ergonomics for computer use, HEP, prognosis, progression    Wall slide for scaption 1 10 Some L LS irritation after   Supine chin tucks  3 10    Prone I, T, unable Y    Supine TB ER at 90 2 15 B red   Supine hoz abd 2 15 red   Supine B shoulder ext with band anchored above dnd2 15 red   Supine D2 flexion 2 15B red   Standing TB shoulder ext B with ER 2 10 red   Traction                     Mechanical cervical traction-flexed 45 dnd21/8# 10' 45/10 Limited on time             Therapeutic Exercise and NMR EXR  [x] (70835) Provided verbal/tactile cueing for activities related to strengthening, flexibility, endurance, ROM  for improvements in cervical, postural, scapular, scapulothoracic and UE control with self care, reaching, carrying, lifting, house/yardwork, driving/computer work.    [] (56679) Provided verbal/tactile cueing for activities related to improving balance, coordination, kinesthetic sense, posture, motor skill, proprioception  to assist with cervical, scapular, scapulothoracic and UE control with self care, reaching, carrying, lifting, house/yardwork, driving/computer work. Therapeutic Activities:    [] (79585 or 49703) Provided verbal/tactile cueing for activities related to improving balance, coordination, kinesthetic sense, posture, motor skill, proprioception and motor activation to allow for proper function of cervical, scapular, scapulothoracic and UE control with self care, carrying, lifting, driving/computer work.      Home Exercise Program:    [x] (51677) Reviewed/Progressed HEP activities related to strengthening, flexibility, endurance, ROM of cervical, scapular, scapulothoracic and UE control with self care, reaching, carrying, lifting, house/yardwork, driving/computer work  [] (94665) Reviewed/Progressed HEP activities related to improving balance, coordination, kinesthetic sense, posture, motor skill, proprioception of cervical, scapular, scapulothoracic and UE control with self care, reaching, carrying, lifting, house/yardwork, driving/computer work      Manual Treatments:  PROM / STM / Oscillations-Mobs:  G-I, II, III, IV (PA's, Inf., Post.)  [x] (13035) Provided manual therapy to mobilize soft tissue/joints of cervical/CT, scapular GHJ and UE for the purpose of decreasing headache, modulating pain, promoting relaxation,  increasing ROM, reducing/eliminating soft tissue swelling/inflammation/restriction, improving soft tissue extensibility and allowing for proper ROM for normal function with self care, reaching, carrying, lifting, house/yardwork, driving/computer work    Modalities:  PM ES to B neck/UT x15 min with MH  Limited on time  Charges  Timed Code Treatment Minutes: 30'   Total Treatment Minutes: 50'     Medicare total (add KX at $2000)         BWC time in/ time out:    TE time in /out    Manual time in/out    Neuro re ed time in/out    Untimed minutes          [] EVAL (LOW) 10225   [] EVAL (MOD) 99335   [] EVAL (HIGH) 56519   [] RE-EVAL     [x] TE(85100) x  1   [] IONTO  [] NMR (39363) x 1   [] VASO  [x] Manual (16981) x 1   [] Other:  [] TA x      [] Mech Traction (89839)  [] ES(attended) (73397)      [x] ES (un) (38062):     GOALS:Patient stated goal: to be able to sleep without waking due to pain.  [] Progressing: [] Met: [] Not Met: [] Adjusted     Therapist goals for Patient:   Short Term Goals: To be achieved in: 2 weeks  1. Independent in HEP and progression per patient tolerance, in order to prevent re-injury.   [] Progressing: [] Met: [] Not Met: [] Adjusted  2. Patient will have a decrease in pain to facilitate improvement in movement, function, and ADLs as indicated by Functional Deficits.  [] Progressing: [] Met: [] Not Met: [] Adjusted     Long Term Goals: To be achieved in: 8 weeks  1. Disability index score of 18% or less for the NDI to assist with reaching prior level of  function. [] Progressing: [] Met: [] Not Met: [] Adjusted  2. Patient will demonstrate increased AROM to [facility] of cervical/thoracic spine to allow for proper joint functioning as indicated by patients Functional Deficits. [] Progressing: [] Met: [] Not Met: [] Adjusted  3. Patient will demonstrate an increase in postural awareness and control and activation of  Deep cervical stabilizers to allow for proper functional mobility as indicated by patients Functional Deficits. [] Progressing: [] Met: [] Not Met: [] Adjusted   4. Patient will return to sitting/computer work functional activities without increased symptoms or restriction. [] Progressing: [] Met: [] Not Met: [] Adjusted  5. Pt. To be able to sleep for 6 hours without waking due to neck/UE pain.(patient specific functional goal)   [] Progressing: [] Met: [] Not Met: [] Adjusted            Overall Progression Towards Functional goals/ Treatment Progress Update:  [] Patient is progressing as expected towards functional goals listed. [] Progression is slowed due to complexities/Impairments listed. [] Progression has been slowed due to co-morbidities. [x] Plan just implemented, too soon to assess goals progression <30days   [] Goals require adjustment due to lack of progress  [] Patient is not progressing as expected and requires additional follow up with physician  [] Other    Prognosis for POC: [x] Good [] Fair  [] Poor      Patient requires continued skilled intervention: [x] Yes  [] No      ASSESSMENT:  pt. Again responding well to treatment, no pain at conclusion of therapy, feels good following ES/heat. Suggested that pt. May want to look into getting a home ES unit as this helps her in PT. Also recommend that she perform chin tucks frequently and trial adding standing shoulder ext ex. With TB and monitor response. Keep avoiding forward head positions.      Treatment/Activity Tolerance:  [x] Patient tolerated treatment well [] Patient limited by jacques  [] Patient limited by pain  [] Patient limited by other medical complications  [] Other:     Prognosis: [x] Good [] Fair  [] Poor    Patient Requires Follow-up: [x] Yes  [] No    PLAN: Rec. Pt. Follow up with MD and pursue MRI, she may benefit from cont. Skilled PT as this reduces pain when she attends. [x] Continue per plan of care [] Alter current plan (see comments above)  [] Plan of care initiated [] Hold pending MD visit [] Discharge      Electronically signed by:  Desiree Ham, PT, ,MSPT, OMT-C [id number]         Note: If patient does not return for scheduled/ recommended follow up visits, this note will serve as a discharge from care along with most recent update on progress.

## 2023-03-01 DIAGNOSIS — M54.2 NECK PAIN: Primary | ICD-10-CM

## 2023-03-01 DIAGNOSIS — M43.02 CERVICAL SPONDYLOLYSIS: ICD-10-CM

## 2023-03-03 ENCOUNTER — HOSPITAL ENCOUNTER (OUTPATIENT)
Dept: PHYSICAL THERAPY | Age: 48
Setting detail: THERAPIES SERIES
Discharge: HOME | End: 2023-03-03
Payer: MEDICAID

## 2023-03-06 ENCOUNTER — HOSPITAL ENCOUNTER (OUTPATIENT)
Dept: PHYSICAL THERAPY | Age: 48
Setting detail: THERAPIES SERIES
Discharge: HOME | End: 2023-03-06
Payer: MEDICAID

## 2023-03-06 PROCEDURE — 97110 THERAPEUTIC EXERCISES: CPT | Performed by: PHYSICAL THERAPIST

## 2023-03-06 PROCEDURE — G0283 ELEC STIM OTHER THAN WOUND: HCPCS | Performed by: PHYSICAL THERAPIST

## 2023-03-06 PROCEDURE — 97140 MANUAL THERAPY 1/> REGIONS: CPT | Performed by: PHYSICAL THERAPIST

## 2023-03-06 NOTE — FLOWSHEET NOTE
[facility], [address]  Phone: [phone]  Fax [phone]      Physical Therapy Treatment Note/ Progress Report:       Date:  3/6/2023    Patient Name:  Corina España    :  1975  MRN: [id number]  Restrictions/Precautions:    Medical/Treatment Diagnosis Information:    M43.02 (ICD-10-CM) - Cervical spondylolysis   M41.9 (ICD-10-CM) - Scoliosis of thoracic spine, unspecified scoliosis type   Treatment diagnosis:Cervical pain H05.2     Insurance/Certification information:  PT Insurance Information: Mayra Rdz  Physician Information:  Michel Capone MD  Has the plan of care been signed (Y/N):        []  Yes  [x]  No     Date of Patient follow up with Physician: none scheduled    Is this a Progress Report:     [x]  Yes  []  No        If Yes:  Date Range for reporting period:  Beginning 23  Ending 23    Progress report will be due (10 Rx or 30 days whichever is less): 44       Recertification will be due (POC Duration  / 90 days whichever is less): 8 weeks from 24       Visit # Insurance Allowable Auth Required   In-person 11  []  Yes []  No    Telehealth   []  Yes []  No    Total        Functional Scale: NDI 36%    Date assessed:  23   Therapy Diagnosis/Practice Pattern:F      Number of Comorbidities:  []0     [x]1-2    []3+     Latex Allergy:  [x]NO      []YES  Preferred Language for Healthcare:   [x]English       []other:    Pain level:  3-7/10     SUBJECTIVE:  pt. Reports that she was ill at the end of last week and is taking ibuprofen and tylenol and is seems to be helping with the pain. The R shoulder is sore today and had B hand numbness with sleeping today. OBJECTIVE: pt. Has not been using home traction due to causes pain.   Observation:Tightness with palpation R  1st rib, and elevated with breath, TTP R SS insertion  Test measurements:   RESTRICTIONS/PRECAUTIONS: Exercises/Interventions:     Access Code: [email or web address]  URL: [email or web address]  Date: 01/24/2023  Prepared by: Harley Drew    Exercises  Supine Cervical Retraction Passive Unloaded - 3-4 x daily - 7 x weekly - 3 sets - 10 reps  Seated Scapular Retraction - 3-4 x daily - 7 x weekly - 3 sets - 10 reps  Seated Upper Trapezius Stretch - 2 x daily - 7 x weekly - 1 sets - 5 reps - 10 hold  Seated Levator Scapulae Stretch on Wall - 2 x daily - 7 x weekly - 1 sets - 5 reps - 10 hold    Therapeutic Ex        Sets/sec Reps Notes         Scap retraction seated        Cane Flexion wide     UT stretch    Levator stretch at wall    Corner pec stretches low elbows E/F For HEP       FR pec stretches low-mid-high Use of long towel roll instead of FR   Seated no money exercise    Chin tuck-supine With towel roll under occiput   Supine L shoulder ER at 90 with band red                   Manual Intervention           Supine Manual cervical traction 2'         IASTM R middle/anterior scalene, postglide R shoulder, PROM R shoulder F with distraction 10'      seated    Did not do        Did not do         No tension today     In prone with cavitation noted     cavitation   Rib mobilizations supine rib 1 R 5'              Chin tucks supine with pillow  3 10 1 pillow   Supine cervical ext. With MWM at C 6-7 into SG R    PA's CTJ    C6-T1 Snags with L rotation and O.P       NMR re-education          Pt.  Edu for posture correction, ergonomics for computer use, HEP, prognosis, progression    Wall slide for scaption dnd1 10 Some L LS irritation after   Supine chin tucks  3 10    Supine diaphragmatic breathing :3 inhale  :3 hold  :3 exhale 10    Prone I, T, unable Y    Supine TB ER at 90 2 15 B red   Supine hoz abd dnd2 15 red   Supine B shoulder ext/ER with band anchored above 2 15 red   Supine D2 flexion dnd2 15B red   Standing TB shoulder ext B with ER dnd2 10 red   Traction                     Mechanical cervical traction-flexed 45 dnd21/8# 10' 45/10 Limited on time             Therapeutic Exercise and NMR EXR  [x] (60687) Provided verbal/tactile cueing for activities related to strengthening, flexibility, endurance, ROM  for improvements in cervical, postural, scapular, scapulothoracic and UE control with self care, reaching, carrying, lifting, house/yardwork, driving/computer work.    [] (85286) Provided verbal/tactile cueing for activities related to improving balance, coordination, kinesthetic sense, posture, motor skill, proprioception  to assist with cervical, scapular, scapulothoracic and UE control with self care, reaching, carrying, lifting, house/yardwork, driving/computer work.    Therapeutic Activities:    [] (98294 or 16907) Provided verbal/tactile cueing for activities related to improving balance, coordination, kinesthetic sense, posture, motor skill, proprioception and motor activation to allow for proper function of cervical, scapular, scapulothoracic and UE control with self care, carrying, lifting, driving/computer work.     Home Exercise Program:    [x] (58241) Reviewed/Progressed HEP activities related to strengthening, flexibility, endurance, ROM of cervical, scapular, scapulothoracic and UE control with self care, reaching, carrying, lifting, house/yardwork, driving/computer work  [] (52583) Reviewed/Progressed HEP activities related to improving balance, coordination, kinesthetic sense, posture, motor skill, proprioception of cervical, scapular, scapulothoracic and UE control with self care, reaching, carrying, lifting, house/yardwork, driving/computer work      Manual Treatments:  PROM / STM / Oscillations-Mobs:  G-I, II, III, IV (PA's, Inf., Post.)  [x] (35707) Provided manual therapy to mobilize soft tissue/joints of cervical/CT, scapular GHJ and UE for the purpose of decreasing headache, modulating pain, promoting relaxation,  increasing ROM, reducing/eliminating soft tissue  swelling/inflammation/restriction, improving soft tissue extensibility and allowing for proper ROM for normal function with self care, reaching, carrying, lifting, house/yardwork, driving/computer work    Modalities:  PM ES to B neck/UT x15 min with MH  Limited on time  Charges  Timed Code Treatment Minutes: 30'   Total Treatment Minutes: 39'     Medicare total (add KX at $2000)         BWC time in/ time out:    TE time in /out    Manual time in/out    Neuro re ed time in/out    Untimed minutes          [] EVAL (LOW) 33333   [] EVAL (MOD) 62098   [] EVAL (HIGH) 20196   [] RE-EVAL     [x] NC(57211) x  1   [] IONTO  [] NMR (53883) x 1   [] VASO  [x] Manual (04714) x 1   [] Other:  [] TA x      [] Mech Traction (91878)  [] ES(attended) (99769)      [x] ES (un) (80804):     Kelsie Negron stated goal: to be able to sleep without waking due to pain. [] Progressing: [] Met: [] Not Met: [] Adjusted     Therapist goals for Patient:   Short Term Goals: To be achieved in: 2 weeks  1. Independent in HEP and progression per patient tolerance, in order to prevent re-injury. [] Progressing: [] Met: [] Not Met: [] Adjusted  2. Patient will have a decrease in pain to facilitate improvement in movement, function, and ADLs as indicated by Functional Deficits. [] Progressing: [] Met: [] Not Met: [] Adjusted     Long Term Goals: To be achieved in: 8 weeks  1. Disability index score of 18% or less for the NDI to assist with reaching prior level of function. [] Progressing: [] Met: [] Not Met: [] Adjusted  2. Patient will demonstrate increased AROM to [facility] of cervical/thoracic spine to allow for proper joint functioning as indicated by patients Functional Deficits. [] Progressing: [] Met: [] Not Met: [] Adjusted  3. Patient will demonstrate an increase in postural awareness and control and activation of  Deep cervical stabilizers to allow for proper functional mobility as indicated by patients Functional Deficits.   [] Progressing: [] Met: [] Not Met: [] Adjusted   4. Patient will return to sitting/computer work functional activities without increased symptoms or restriction. [] Progressing: [] Met: [] Not Met: [] Adjusted  5. Pt. To be able to sleep for 6 hours without waking due to neck/UE pain.(patient specific functional goal)   [] Progressing: [] Met: [] Not Met: [] Adjusted            Overall Progression Towards Functional goals/ Treatment Progress Update:  [] Patient is progressing as expected towards functional goals listed. [] Progression is slowed due to complexities/Impairments listed. [] Progression has been slowed due to co-morbidities. [x] Plan just implemented, too soon to assess goals progression <30days   [] Goals require adjustment due to lack of progress  [] Patient is not progressing as expected and requires additional follow up with physician  [] Other    Prognosis for POC: [x] Good [] Fair  [] Poor      Patient requires continued skilled intervention: [x] Yes  [] No      ASSESSMENT:  pt. With less frequent UE symptoms, that today resolved with rib mobs R. She is having less pain and m. Tension L side neck, UT, LS. Likely getting some relief due to increased use of OTC meds the past few days. No return of pain with performance of ex. Challenged with breathing ex. Treatment/Activity Tolerance:  [x] Patient tolerated treatment well [] Patient limited by fatique  [] Patient limited by pain  [] Patient limited by other medical complications  [] Other:     Prognosis: [x] Good [] Fair  [] Poor    Patient Requires Follow-up: [x] Yes  [] No    PLAN: Rec. Pt. Follow up with MD and pursue MRI, she may benefit from cont. Skilled PT as this reduces pain when she attends.   [x] Continue per plan of care [] Alter current plan (see comments above)  [] Plan of care initiated [] Hold pending MD visit [] Discharge      Electronically signed by:  Cassidy Steward, PT, ,MSPT, OMT-C [id number]         Note: If patient does not return for scheduled/ recommended follow up visits, this note will serve as a discharge from care along with most recent update on progress.

## 2023-03-10 ENCOUNTER — HOSPITAL ENCOUNTER (OUTPATIENT)
Dept: PHYSICAL THERAPY | Age: 48
Setting detail: THERAPIES SERIES
Discharge: HOME | End: 2023-03-10
Payer: MEDICAID

## 2023-03-10 PROCEDURE — 97140 MANUAL THERAPY 1/> REGIONS: CPT | Performed by: PHYSICAL THERAPIST

## 2023-03-10 PROCEDURE — 97110 THERAPEUTIC EXERCISES: CPT | Performed by: PHYSICAL THERAPIST

## 2023-03-10 PROCEDURE — G0283 ELEC STIM OTHER THAN WOUND: HCPCS | Performed by: PHYSICAL THERAPIST

## 2023-03-10 NOTE — FLOWSHEET NOTE
[facility], [address] [place]  Phone: [phone]  Fax [phone]      Physical Therapy Treatment Note/ Progress Report:       Date:  3/10/2023    Patient Name:  Cassandra Virk    :  1975  MRN: [id number]  Restrictions/Precautions:    Medical/Treatment Diagnosis Information:    M43.02 (ICD-10-CM) - Cervical spondylolysis   M41.9 (ICD-10-CM) - Scoliosis of thoracic spine, unspecified scoliosis type   Treatment diagnosis:Cervical pain O34.2     Insurance/Certification information:  PT Insurance Information: Kindra Mar  Physician Information:  Samantha Monreal MD  Has the plan of care been signed (Y/N):        []  Yes  [x]  No     Date of Patient follow up with Physician: none scheduled    Is this a Progress Report:     [x]  Yes  []  No        If Yes:  Date Range for reporting period:  Beginning 23  Ending 23    Progress report will be due (10 Rx or 30 days whichever is less): 96       Recertification will be due (POC Duration  / 90 days whichever is less): 8 weeks from 24       Visit # Insurance Allowable Auth Required   In-person 12  []  Yes []  No    Telehealth   []  Yes []  No    Total        Functional Scale: NDI 36%    Date assessed:  23   Therapy Diagnosis/Practice Pattern:F      Number of Comorbidities:  []0     [x]1-2    []3+     Latex Allergy:  [x]NO      []YES  Preferred Language for Healthcare:   [x]English       []other:    Pain level:  3-7/10     SUBJECTIVE:  pt. Reports that she is status quo      OBJECTIVE: pt. Has not been using home traction due to causes pain. Observation:Tightness with palpation R  1st rib, and elevated with breath, TTP R SS insertion  Test measurements:   RESTRICTIONS/PRECAUTIONS:     Exercises/Interventions:     Access Code: [email or web address]  URL: [email or web address]  Date: 2023  Prepared by: Cyndee Sen    Exercises  Supine Cervical Retraction Passive Unloaded - 3-4 x daily - 7 x weekly - 3 sets - 10 reps  Seated Scapular Retraction - 3-4 x daily - 7 x weekly - 3 sets - 10 reps  Seated Upper Trapezius Stretch - 2 x daily - 7 x weekly - 1 sets - 5 reps - 10 hold  Seated Levator Scapulae Stretch on Wall - 2 x daily - 7 x weekly - 1 sets - 5 reps - 10 hold    Therapeutic Ex        Sets/sec Reps Notes         Scap retraction seated        Cane Flexion wide  :10 2x10    UT stretch    Levator stretch at wall    Corner pec stretches low elbows E/F For HEP       FR pec stretches low-mid-high Use of long towel roll instead of FR   Seated no money exercise    Chin tuck-supine With towel roll under occiput   Supine L shoulder ER at 90 with band red                   Manual Intervention           Supine Manual cervical traction 2'             IASTM L LS 10'  seated    Did not do        Did not do         No tension today     In prone with cavitation noted     cavitation   Rib mobilizations supine rib 1 R              Chin tucks supine with pillow  3 10 1 pillow   Supine cervical ext. With MWM at C 6-7 into SG R    PA's CTJ    C6-T1 Snags with L rotation and O.P       NMR re-education          Pt.  Edu for posture correction, ergonomics for computer use, HEP, prognosis, progression    Wall slide for scaption dnd1 10 Some L LS irritation after   Supine chin tucks  3 10    Supine diaphragmatic breathing :3 inhale  :3 hold  :3 exhale 10    Prone I, T, unable Y    Supine TB ER at 90 2 15 B red   Supine hoz abd dnd2 15 red   Supine B shoulder ext/ER with band anchored above 2 15 red   Supine D2 flexion dnd2 15B red   Standing TB shoulder ext B with ER dnd2 10 red   Traction                     Mechanical cervical traction-flexed 45 dnd21/8# 10' 45/10 Limited on time             Therapeutic Exercise and NMR EXR  [x] (20804) Provided verbal/tactile cueing for activities related to strengthening, flexibility, endurance, ROM  for improvements in cervical, postural, scapular, scapulothoracic and UE control with self care, reaching, carrying, lifting, house/yardwork, driving/computer work.    [] (52105) Provided verbal/tactile cueing for activities related to improving balance, coordination, kinesthetic sense, posture, motor skill, proprioception  to assist with cervical, scapular, scapulothoracic and UE control with self care, reaching, carrying, lifting, house/yardwork, driving/computer work. Therapeutic Activities:    [] (82188 or 31643) Provided verbal/tactile cueing for activities related to improving balance, coordination, kinesthetic sense, posture, motor skill, proprioception and motor activation to allow for proper function of cervical, scapular, scapulothoracic and UE control with self care, carrying, lifting, driving/computer work.      Home Exercise Program:    [x] (65095) Reviewed/Progressed HEP activities related to strengthening, flexibility, endurance, ROM of cervical, scapular, scapulothoracic and UE control with self care, reaching, carrying, lifting, house/yardwork, driving/computer work  [] (45054) Reviewed/Progressed HEP activities related to improving balance, coordination, kinesthetic sense, posture, motor skill, proprioception of cervical, scapular, scapulothoracic and UE control with self care, reaching, carrying, lifting, house/yardwork, driving/computer work      Manual Treatments:  PROM / STM / Oscillations-Mobs:  G-I, II, III, IV (PA's, Inf., Post.)  [x] (86547) Provided manual therapy to mobilize soft tissue/joints of cervical/CT, scapular GHJ and UE for the purpose of decreasing headache, modulating pain, promoting relaxation,  increasing ROM, reducing/eliminating soft tissue swelling/inflammation/restriction, improving soft tissue extensibility and allowing for proper ROM for normal function with self care, reaching, carrying, lifting, house/yardwork, driving/computer work    Modalities:  PM ES to B neck/UT x15 min with MH  Limited on time  Charges  Timed Code Treatment Minutes: 30'   Total Treatment Minutes: 45'     Medicare total (add KX at $2000)         BWC time in/ time out:    TE time in /out    Manual time in/out    Neuro re ed time in/out    Untimed minutes          [] EVAL (LOW) 87359   [] EVAL (MOD) 14716   [] EVAL (HIGH) 40148   [] RE-EVAL     [x] TE(95938) x  1   [] IONTO  [] NMR (66810) x 1   [] VASO  [x] Manual (41226) x 1   [] Other:  [] TA x      [] Mech Traction (08095)  [] ES(attended) (76079)      [x] ES (un) (65983):     GOALS:Patient stated goal: to be able to sleep without waking due to pain.  [] Progressing: [] Met: [] Not Met: [] Adjusted     Therapist goals for Patient:   Short Term Goals: To be achieved in: 2 weeks  1. Independent in HEP and progression per patient tolerance, in order to prevent re-injury.   [] Progressing: [] Met: [] Not Met: [] Adjusted  2. Patient will have a decrease in pain to facilitate improvement in movement, function, and ADLs as indicated by Functional Deficits.  [] Progressing: [] Met: [] Not Met: [] Adjusted     Long Term Goals: To be achieved in: 8 weeks  1. Disability index score of 18% or less for the NDI to assist with reaching prior level of function.   [] Progressing: [] Met: [] Not Met: [] Adjusted  2. Patient will demonstrate increased AROM to WFL of cervical/thoracic spine to allow for proper joint functioning as indicated by patients Functional Deficits.   [] Progressing: [] Met: [] Not Met: [] Adjusted  3. Patient will demonstrate an increase in postural awareness and control and activation of  Deep cervical stabilizers to allow for proper functional mobility as indicated by patients Functional Deficits.  [] Progressing: [] Met: [] Not Met: [] Adjusted   4. Patient will return to sitting/computer work functional activities without increased symptoms or restriction.   [] Progressing: [] Met: [] Not Met: [] Adjusted  5. Pt. To be able to sleep for 6 hours without waking due to  neck/UE pain.(patient specific functional goal)   [] Progressing: [] Met: [] Not Met: [] Adjusted            Overall Progression Towards Functional goals/ Treatment Progress Update:  [] Patient is progressing as expected towards functional goals listed. [] Progression is slowed due to complexities/Impairments listed. [] Progression has been slowed due to co-morbidities. [x] Plan just implemented, too soon to assess goals progression <30days   [] Goals require adjustment due to lack of progress  [] Patient is not progressing as expected and requires additional follow up with physician  [] Other    Prognosis for POC: [x] Good [] Fair  [] Poor      Patient requires continued skilled intervention: [x] Yes  [] No      ASSESSMENT:  pt. With less frequent UE symptoms, that today resolved with rib mobs R. She is having less pain and m. Tension L side neck, UT, LS. Likely getting some relief due to increased use of OTC meds the past few days. No return of pain with performance of ex. Challenged with breathing ex. Treatment/Activity Tolerance:  [x] Patient tolerated treatment well [] Patient limited by fatique  [] Patient limited by pain  [] Patient limited by other medical complications  [] Other:     Prognosis: [x] Good [] Fair  [] Poor    Patient Requires Follow-up: [x] Yes  [] No    PLAN: Rec. Pt. Follow up with MD and pursue MRI, she may benefit from cont. Skilled PT as this reduces pain when she attends. [x] Continue per plan of care [] Alter current plan (see comments above)  [] Plan of care initiated [] Hold pending MD visit [] Discharge      Electronically signed by:  Yulissa Rosas, PT, ,MSPT, OMT-C [id number]         Note: If patient does not return for scheduled/ recommended follow up visits, this note will serve as a discharge from care along with most recent update on progress.

## 2023-03-13 ENCOUNTER — HOSPITAL ENCOUNTER (OUTPATIENT)
Dept: PHYSICAL THERAPY | Age: 48
Setting detail: THERAPIES SERIES
Discharge: HOME | End: 2023-03-13
Payer: MEDICAID

## 2023-03-13 PROCEDURE — G0283 ELEC STIM OTHER THAN WOUND: HCPCS | Performed by: PHYSICAL THERAPIST

## 2023-03-13 PROCEDURE — 97110 THERAPEUTIC EXERCISES: CPT | Performed by: PHYSICAL THERAPIST

## 2023-03-13 PROCEDURE — 97140 MANUAL THERAPY 1/> REGIONS: CPT | Performed by: PHYSICAL THERAPIST

## 2023-03-13 NOTE — FLOWSHEET NOTE
[facility], [address]  Phone: [phone]  Fax [phone]      Physical Therapy Treatment Note/ Progress Report:       Date:  3/13/2023    Patient Name:  Musa Jean Baptiste    :  1975  MRN: [id number]  Restrictions/Precautions:    Medical/Treatment Diagnosis Information:    M43.02 (ICD-10-CM) - Cervical spondylolysis   M41.9 (ICD-10-CM) - Scoliosis of thoracic spine, unspecified scoliosis type   Treatment diagnosis:Cervical pain X93.0     Insurance/Certification information:  PT Insurance Information: Jim Peterson  Physician Information:  Shreyas Arguello MD  Has the plan of care been signed (Y/N):        []  Yes  [x]  No     Date of Patient follow up with Physician: none scheduled    Is this a Progress Report:     [x]  Yes  []  No        If Yes:  Date Range for reporting period:  Beginning 23  Ending 23    Progress report will be due (10 Rx or 30 days whichever is less):        Recertification will be due (POC Duration  / 90 days whichever is less): 8 weeks from 24       Visit # Insurance Allowable Auth Required   In-person 13  []  Yes []  No    Telehealth   []  Yes []  No    Total        Functional Scale: NDI 36%    Date assessed:  23   Therapy Diagnosis/Practice Pattern:F      Number of Comorbidities:  []0     [x]1-2    []3+     Latex Allergy:  [x]NO      []YES  Preferred Language for Healthcare:   [x]English       []other:    Pain level:  3-7/10     SUBJECTIVE:  pt. Reports that she is status quo. OBJECTIVE: pt. Has not been using home traction due to causes pain. Observation:TTP L LS, +TrP L LS, jamarcus with SB L, less pain with rotation L  Test measurements:   RESTRICTIONS/PRECAUTIONS:     Exercises/Interventions:     Access Code: [email or web address]  URL: [email or web address]  Date: 2023  Prepared by: Basilia Salinas    Exercises  Supine Cervical Retraction Passive Unloaded - 3-4 x daily - 7 x weekly - 3 sets - 10 reps  Seated Scapular Retraction - 3-4 x daily - 7 x weekly - 3 sets - 10 reps  Seated Upper Trapezius Stretch - 2 x daily - 7 x weekly - 1 sets - 5 reps - 10 hold  Seated Levator Scapulae Stretch on Wall - 2 x daily - 7 x weekly - 1 sets - 5 reps - 10 hold    Therapeutic Ex        Sets/sec Reps Notes         Scap retraction seated        Cane Flexion wide     UT stretch    Levator stretch at wall    Corner pec stretches low elbows E/F For HEP       FR pec stretches low-mid-high Use of long towel roll instead of FR   Seated no money exercise    Chin tuck-supine 3 10 With towel roll under occiput   Supine L shoulder ER at 90 with band red                   Manual Intervention           Supine Manual cervical traction 2'             IASTM L LS 10'  seated   Cupping L LS 6' Gentle UT stretch L, chin tuck , thread the needle on table x10 ea    Supine chin tucks EOB 4  3 10  10   After traction tiral   Cervical ext EOB-25% Pain at CTJ-held          Did not do         No tension today     In prone with cavitation noted     cavitation   Rib mobilizations supine rib 1 R              Chin tucks supine with pillow  3 10 1 pillow   Supine cervical ext. With MWM at C 6-7 into SG R    PA's CTJ    C6-T1 Snags with L rotation and O.P    H6 6 reps Able to abolish pain with cervical rotation L   NMR re-education          Pt.  Edu for posture correction, ergonomics for computer use, HEP, prognosis, progression    Wall slide for scaption dnd1 10 Some L LS irritation after   Supine chin tucks  3 10    Supine diaphragmatic breathing :3 inhale  :3 hold  :3 exhale 10    Prone I, T, unable Y    Supine TB ER at 90 2 15 B red   Supine hoz abd dnd2 15 red   Supine B shoulder ext/ER with band anchored above 2 15 red   Supine D2 flexion dnd2 15B red   Standing TB shoulder ext B with ER dnd2 10 red   Traction                     Mechanical cervical traction-flexed 45 21/8# 5' 45/10 Trial, increased pain during-held             Therapeutic Exercise and NMR EXR  [x] (40875) Provided verbal/tactile cueing for activities related to strengthening, flexibility, endurance, ROM  for improvements in cervical, postural, scapular, scapulothoracic and UE control with self care, reaching, carrying, lifting, house/yardwork, driving/computer work.    [] (17723) Provided verbal/tactile cueing for activities related to improving balance, coordination, kinesthetic sense, posture, motor skill, proprioception  to assist with cervical, scapular, scapulothoracic and UE control with self care, reaching, carrying, lifting, house/yardwork, driving/computer work. Therapeutic Activities:    [] (01898 or 66244) Provided verbal/tactile cueing for activities related to improving balance, coordination, kinesthetic sense, posture, motor skill, proprioception and motor activation to allow for proper function of cervical, scapular, scapulothoracic and UE control with self care, carrying, lifting, driving/computer work.      Home Exercise Program:    [x] (02727) Reviewed/Progressed HEP activities related to strengthening, flexibility, endurance, ROM of cervical, scapular, scapulothoracic and UE control with self care, reaching, carrying, lifting, house/yardwork, driving/computer work  [] (84754) Reviewed/Progressed HEP activities related to improving balance, coordination, kinesthetic sense, posture, motor skill, proprioception of cervical, scapular, scapulothoracic and UE control with self care, reaching, carrying, lifting, house/yardwork, driving/computer work      Manual Treatments:  PROM / STM / Oscillations-Mobs:  G-I, II, III, IV (PA's, Inf., Post.)  [x] (67525) Provided manual therapy to mobilize soft tissue/joints of cervical/CT, scapular GHJ and UE for the purpose of decreasing headache, modulating pain, promoting relaxation,  increasing ROM, reducing/eliminating soft tissue swelling/inflammation/restriction, improving soft tissue extensibility and allowing for proper ROM for normal function with self care, reaching, carrying, lifting, house/yardwork, driving/computer work    Modalities:  PM ES to B neck/UT x15 min with MH  Limited on time  Charges  Timed Code Treatment Minutes: 30'   Total Treatment Minutes: 39'     Medicare total (add KX at $2000)         BWC time in/ time out:    TE time in /out    Manual time in/out    Neuro re ed time in/out    Untimed minutes          [] EVAL (LOW) 01082   [] EVAL (MOD) 51153   [] EVAL (HIGH) 39712   [] RE-EVAL     [x] RM(43940) x  1   [] IONTO  [] NMR (93992) x 1   [] VASO  [x] Manual (65093) x 1   [] Other:  [] TA x      [] Mech Traction (85408)  [] ES(attended) (45892)      [x] ES (un) (17063):     Shelia Doty stated goal: to be able to sleep without waking due to pain. [] Progressing: [] Met: [] Not Met: [] Adjusted     Therapist goals for Patient:   Short Term Goals: To be achieved in: 2 weeks  1. Independent in HEP and progression per patient tolerance, in order to prevent re-injury. [] Progressing: [] Met: [] Not Met: [] Adjusted  2. Patient will have a decrease in pain to facilitate improvement in movement, function, and ADLs as indicated by Functional Deficits. [] Progressing: [] Met: [] Not Met: [] Adjusted     Long Term Goals: To be achieved in: 8 weeks  1. Disability index score of 18% or less for the NDI to assist with reaching prior level of function. [] Progressing: [] Met: [] Not Met: [] Adjusted  2. Patient will demonstrate increased AROM to [facility] of cervical/thoracic spine to allow for proper joint functioning as indicated by patients Functional Deficits. [] Progressing: [] Met: [] Not Met: [] Adjusted  3. Patient will demonstrate an increase in postural awareness and control and activation of  Deep cervical stabilizers to allow for proper functional mobility as indicated by patients Functional Deficits. [] Progressing: [] Met: [] Not Met: [] Adjusted   4.  Patient will return to sitting/computer work functional activities without increased symptoms or restriction. [] Progressing: [] Met: [] Not Met: [] Adjusted  5. Pt. To be able to sleep for 6 hours without waking due to neck/UE pain.(patient specific functional goal)   [] Progressing: [] Met: [] Not Met: [] Adjusted            Overall Progression Towards Functional goals/ Treatment Progress Update:  [] Patient is progressing as expected towards functional goals listed. [] Progression is slowed due to complexities/Impairments listed. [] Progression has been slowed due to co-morbidities. [x] Plan just implemented, too soon to assess goals progression <30days   [] Goals require adjustment due to lack of progress  [] Patient is not progressing as expected and requires additional follow up with physician  [] Other    Prognosis for POC: [x] Good [] Fair  [] Poor      Patient requires continued skilled intervention: [x] Yes  [] No      ASSESSMENT:  able to lessen L LS tension with STW, but mostly improved following supine chin tucks off EOB and with PA mobs at CTJ. Trial with cervical traction today and started with a HA shortly after starting to held, performed chin tucks and PA mobs at CTJ and able to restore pain free cervical ROM, abolished HA, and neck pain. Treatment/Activity Tolerance:  [x] Patient tolerated treatment well [] Patient limited by fatique  [] Patient limited by pain  [] Patient limited by other medical complications  [] Other:     Prognosis: [x] Good [] Fair  [] Poor    Patient Requires Follow-up: [x] Yes  [] No    PLAN: Pt. Is awaiting MRI approval., discuss holding PT until follow up with MD if no further improvements noted with PT, pt. Instructed In looking in to buying OTC TENS unit due to relief with ES. (Digital TENS 7000).   [x] Continue per plan of care [] Alter current plan (see comments above)  [] Plan of care initiated [] Hold pending MD visit [] Discharge      Electronically signed by: Konstantin Perales, PT, ,MSPT, OMT-C [id number]         Note: If patient does not return for scheduled/ recommended follow up visits, this note will serve as a discharge from care along with most recent update on progress.

## 2023-03-16 ENCOUNTER — HOSPITAL ENCOUNTER (OUTPATIENT)
Dept: PHYSICAL THERAPY | Age: 48
Setting detail: THERAPIES SERIES
Discharge: HOME | End: 2023-03-16
Payer: MEDICAID

## 2023-03-16 PROCEDURE — 97140 MANUAL THERAPY 1/> REGIONS: CPT | Performed by: PHYSICAL THERAPIST

## 2023-03-16 PROCEDURE — G0283 ELEC STIM OTHER THAN WOUND: HCPCS | Performed by: PHYSICAL THERAPIST

## 2023-03-16 NOTE — DISCHARGE SUMMARY
[facility], [address] [place]  Phone: [phone]  Fax [phone]      Physical Therapy Treatment Note/ Progress Report:       Date:  3/16/2023    Patient Name:  Jose Simmons    :  1975  MRN: [id number]  Restrictions/Precautions:    Medical/Treatment Diagnosis Information:    M43.02 (ICD-10-CM) - Cervical spondylolysis   M41.9 (ICD-10-CM) - Scoliosis of thoracic spine, unspecified scoliosis type   Treatment diagnosis:Cervical pain E05.6     Insurance/Certification information:  PT Insurance Information: Conrado Song  Physician Information:  Mikey Gauthier MD  Has the plan of care been signed (Y/N):        []  Yes  [x]  No     Date of Patient follow up with Physician: none scheduled    Is this a Progress Report:     [x]  Yes  []  No        If Yes:  Date Range for reporting period:  Beginning 23  Ending 23    Progress report will be due (10 Rx or 30 days whichever is less): 57       Recertification will be due (POC Duration  / 90 days whichever is less): 8 weeks from 24       Visit # Insurance Allowable Auth Required   In-person 14  []  Yes []  No    Telehealth   []  Yes []  No    Total        Functional Scale: NDI 36%    Date assessed:  23     NDI 36% 18/50    3/16/23   Therapy Diagnosis/Practice Pattern:F      Number of Comorbidities:  []0     [x]1-2    []3+     Latex Allergy:  [x]NO      []YES  Preferred Language for Healthcare:   [x]English       []other:    Pain level:  3-7/10     SUBJECTIVE:  pt. Reports that she was worse with neck pain after last visit, but did order the TENS unit and it came the next day and has been helpful. She has been busy working on her [employer] this week and doing all over her other jobs and is feeling more pain in muscles on either side of neck, did not sleep well last night. She has MRI scheduled for next week. OBJECTIVE: pt.  Has not been using home traction due to causes pain. Observation:TTP L LS, +TrP L LS,limited with rotation L  Tight suboccipitals  Test measurements:   RESTRICTIONS/PRECAUTIONS:     Exercises/Interventions:     Access Code: [email or web address]  URL: [email or web address]  Date: 01/24/2023  Prepared by: Deirdre Kraus    Exercises  Supine Cervical Retraction Passive Unloaded - 3-4 x daily - 7 x weekly - 3 sets - 10 reps  Seated Scapular Retraction - 3-4 x daily - 7 x weekly - 3 sets - 10 reps  Seated Upper Trapezius Stretch - 2 x daily - 7 x weekly - 1 sets - 5 reps - 10 hold  Seated Levator Scapulae Stretch on Wall - 2 x daily - 7 x weekly - 1 sets - 5 reps - 10 hold    Therapeutic Ex        Sets/sec Reps Notes         Scap retraction seated        Cane Flexion wide     UT stretch    Levator stretch at wall    Corner pec stretches low elbows E/F For HEP       FR pec stretches low-mid-high Use of long towel roll instead of FR   Seated no money exercise    Chin tuck-supine 3 10 With towel roll under occiput   Supine L shoulder ER at 90 with band red                   Manual Intervention           Supine Manual cervical traction 2'  With chin tucks   SOR 8'     IASTM R middle/anterior scalene,UT/LS 10'     IASTM L LS/UT, post scalene 10'  seated      Supine chin tucks I pillow with manual cervical traction 4 10    Cervical ext EOB-25%          Did not do         No tension today     In prone with cavitation noted     cavitation   Rib mobilizations supine rib 1 R              Chin tucks supine with pillow  3 10 1 pillow      PA's CTJ 2'    C6-T1 Snags with L rotation and O.P    3 6 reps Able to abolish pain with cervical rotation L   NMR re-education          Pt.  Edu for posture correction, ergonomics for computer use, HEP, prognosis, progression    Some L LS irritation after   Supine chin tucks  3 10          red   red   red   red   red                  Therapeutic Exercise and NMR EXR  [x] (10641) Provided verbal/tactile cueing for activities related to strengthening, flexibility, endurance, ROM  for improvements in cervical, postural, scapular, scapulothoracic and UE control with self care, reaching, carrying, lifting, house/yardwork, driving/computer work.    [] (83983) Provided verbal/tactile cueing for activities related to improving balance, coordination, kinesthetic sense, posture, motor skill, proprioception  to assist with cervical, scapular, scapulothoracic and UE control with self care, reaching, carrying, lifting, house/yardwork, driving/computer work. Therapeutic Activities:    [] (59263 or 63239) Provided verbal/tactile cueing for activities related to improving balance, coordination, kinesthetic sense, posture, motor skill, proprioception and motor activation to allow for proper function of cervical, scapular, scapulothoracic and UE control with self care, carrying, lifting, driving/computer work.      Home Exercise Program:    [x] (18879) Reviewed/Progressed HEP activities related to strengthening, flexibility, endurance, ROM of cervical, scapular, scapulothoracic and UE control with self care, reaching, carrying, lifting, house/yardwork, driving/computer work  [] (27882) Reviewed/Progressed HEP activities related to improving balance, coordination, kinesthetic sense, posture, motor skill, proprioception of cervical, scapular, scapulothoracic and UE control with self care, reaching, carrying, lifting, house/yardwork, driving/computer work      Manual Treatments:  PROM / STM / Oscillations-Mobs:  G-I, II, III, IV (PA's, Inf., Post.)  [x] (99648) Provided manual therapy to mobilize soft tissue/joints of cervical/CT, scapular GHJ and UE for the purpose of decreasing headache, modulating pain, promoting relaxation,  increasing ROM, reducing/eliminating soft tissue swelling/inflammation/restriction, improving soft tissue extensibility and allowing for proper ROM for normal function with self care, reaching, carrying, lifting, house/yardwork, driving/computer work    Modalities:  PM ES to B neck/UT x15 min with   Limited on time  Charges  Timed Code Treatment Minutes: 35'   Total Treatment Minutes: 48'     Medicare total (add KX at $2000)         BW time in/ time out:    TE time in /out    Manual time in/out    Neuro re ed time in/out    Untimed minutes          [] EVAL (LOW) 15048   [] EVAL (MOD) 04837   [] EVAL (HIGH) 16682   [] RE-EVAL     [] LE(09541) x  1   [] IONTO  [] NMR (64554) x 1   [] VASO  [x] Manual (19848) x 2   [] Other:  [] TA x      [] Mech Traction (82235)  [] ES(attended) (83575)      [x] ES (un) (79206):     Sonya Morrissey stated goal: to be able to sleep without waking due to pain. [x] Progressing: [] Met: [] Not Met: [] Adjusted     Therapist goals for Patient:   Short Term Goals: To be achieved in: 2 weeks  1. Independent in HEP and progression per patient tolerance, in order to prevent re-injury. [x] Progressing: [] Met: [] Not Met: [] Adjusted  2. Patient will have a decrease in pain to facilitate improvement in movement, function, and ADLs as indicated by Functional Deficits. [] Progressing: [x] Met: [] Not Met: [] Adjusted     Long Term Goals: To be achieved in: 8 weeks  1. Disability index score of 18% or less for the NDI to assist with reaching prior level of function. [] Progressing: [] Met: [x] Not Met: [] Adjusted  2. Patient will demonstrate increased AROM to [facility] of cervical/thoracic spine to allow for proper joint functioning as indicated by patients Functional Deficits. [x] Progressing: [] Met: [] Not Met: [] Adjusted  3. Patient will demonstrate an increase in postural awareness and control and activation of  Deep cervical stabilizers to allow for proper functional mobility as indicated by patients Functional Deficits. [] Progressing: [x] Met: [] Not Met: [] Adjusted   4. Patient will return to sitting/computer work functional activities without increased symptoms or restriction.    [x] Progressing: [] Met: [] Not Met: [] Adjusted  5. Pt. To be able to sleep for 6 hours without waking due to neck/UE pain.(patient specific functional goal)   [x] Progressing: [] Met: [] Not Met: [] Adjusted            Overall Progression Towards Functional goals/ Treatment Progress Update:  [] Patient is progressing as expected towards functional goals listed. [] Progression is slowed due to complexities/Impairments listed. [] Progression has been slowed due to co-morbidities. 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 Or formal PT, she is gradually progressing with PT goals, but not sleeping better or having consistent improvement. She is scheduled for MRI. We will hold PT for now and await MRI finding. Treatment/Activity Tolerance:  [x] Patient tolerated treatment well [] Patient limited by fatique  [] Patient limited by pain  [] Patient limited by other medical complications  [] Other:     Prognosis: [x] Good [] Fair  [] Poor    Patient Requires Follow-up: [] Yes  [x] No    PLAN: Hold PT until follow up with MD following MRI /DC if MD agrees(Digital TENS 7000).   [] Continue per plan of care [] Alter current plan (see comments above)  [] Plan of care initiated [x] Hold pending MD visit [] Discharge      Electronically signed by:  Wanda Lemon, PT, ,MSPT, OMT-C [id number]         Note: If patient does not return for scheduled/ recommended follow up visits, this note will serve as a discharge from care along with most recent update on progress.

## 2023-03-20 ENCOUNTER — APPOINTMENT (OUTPATIENT)
Dept: PHYSICAL THERAPY | Age: 48
End: 2023-03-20
Payer: MEDICAID

## 2023-03-23 ENCOUNTER — APPOINTMENT (OUTPATIENT)
Dept: PHYSICAL THERAPY | Age: 48
End: 2023-03-23
Payer: MEDICAID

## 2023-03-24 ENCOUNTER — HOSPITAL ENCOUNTER (OUTPATIENT)
Dept: MRI IMAGING | Age: 48
Discharge: HOME | End: 2023-03-24
Payer: MEDICAID

## 2023-03-24 DIAGNOSIS — M43.02 CERVICAL SPONDYLOLYSIS: ICD-10-CM

## 2023-03-24 DIAGNOSIS — M54.2 NECK PAIN: ICD-10-CM

## 2023-03-24 PROCEDURE — 72141 MRI NECK SPINE W/O DYE: CPT

## 2023-03-29 ENCOUNTER — TELEPHONE (OUTPATIENT)
Dept: ORTHOPEDIC SURGERY | Age: 48
End: 2023-03-29

## 2023-03-29 DIAGNOSIS — M51.36 DDD (DEGENERATIVE DISC DISEASE), LUMBAR: Primary | ICD-10-CM

## 2023-03-29 NOTE — TELEPHONE ENCOUNTER
----- Message from Luis Stewart MD sent at 3/27/2023  2:33 PM EDT -----  Cervical MRI  Disc Protrusion with mild nerve compression  May want to try injection

## 2023-05-19 DIAGNOSIS — E78.00 HIGH CHOLESTEROL: ICD-10-CM

## 2023-05-19 RX ORDER — ATORVASTATIN CALCIUM 20 MG/1
20 TABLET, FILM COATED ORAL NIGHTLY
Qty: 30 TABLET | Refills: 5 | Status: SHIPPED | OUTPATIENT
Start: 2023-05-19 | End: 2023-06-18

## 2023-05-19 NOTE — TELEPHONE ENCOUNTER
[employer] is requesting 90 day rx below    atorvastatin (LIPITOR) 20 MG tablet       Please advise

## 2023-05-19 NOTE — TELEPHONE ENCOUNTER
Last Office Visit  -  11/21/22  Next Office Visit  -  n/a    Last Filled  -  11/21/22  Last UDS -    Contract -      *please advise for 90 day supply

## 2023-05-25 ENCOUNTER — COMMUNITY OUTREACH (OUTPATIENT)
Dept: FAMILY MEDICINE CLINIC | Age: 48
End: 2023-05-25

## 2023-05-26 ENCOUNTER — PATIENT MESSAGE (OUTPATIENT)
Dept: FAMILY MEDICINE CLINIC | Age: 48
End: 2023-05-26

## 2023-05-26 DIAGNOSIS — R21 RASH AND NONSPECIFIC SKIN ERUPTION: Primary | ICD-10-CM

## 2023-05-26 NOTE — TELEPHONE ENCOUNTER
From: Angel Amaya  To: Jose Nayak  Sent: 5/26/2023 9:43 AM EDT  Subject: Skin Allergy     Good morning Madalyn Satchel   I have a skin allergy (like mosquitoes bites) very itchy on my hands. It has been for 2 weeks and I am using hydrocortisone but it is not effective. I was wondering if you can refer me to a good dermatologist please.    Thank you

## 2023-06-01 ENCOUNTER — OFFICE VISIT (OUTPATIENT)
Dept: FAMILY MEDICINE CLINIC | Age: 48
End: 2023-06-01

## 2023-06-01 VITALS
BODY MASS INDEX: 25.34 KG/M2 | DIASTOLIC BLOOD PRESSURE: 60 MMHG | WEIGHT: 157 LBS | SYSTOLIC BLOOD PRESSURE: 98 MMHG | OXYGEN SATURATION: 98 % | HEART RATE: 84 BPM

## 2023-06-01 DIAGNOSIS — E03.9 HYPOTHYROIDISM, UNSPECIFIED TYPE: ICD-10-CM

## 2023-06-01 DIAGNOSIS — J30.9 ALLERGIC RHINITIS, UNSPECIFIED SEASONALITY, UNSPECIFIED TRIGGER: ICD-10-CM

## 2023-06-01 DIAGNOSIS — E78.00 HIGH CHOLESTEROL: ICD-10-CM

## 2023-06-01 DIAGNOSIS — B07.9 VIRAL WARTS, UNSPECIFIED TYPE: ICD-10-CM

## 2023-06-01 DIAGNOSIS — Z12.11 COLON CANCER SCREENING: ICD-10-CM

## 2023-06-01 DIAGNOSIS — R21 SKIN RASH: Primary | ICD-10-CM

## 2023-06-01 LAB
ALBUMIN SERPL-MCNC: 4.8 G/DL (ref 3.4–5)
ALBUMIN/GLOB SERPL: 1.9 {RATIO} (ref 1.1–2.2)
ALP SERPL-CCNC: 74 U/L (ref 40–129)
ALT SERPL-CCNC: 11 U/L (ref 10–40)
ANION GAP SERPL CALCULATED.3IONS-SCNC: 10 MMOL/L (ref 3–16)
AST SERPL-CCNC: 12 U/L (ref 15–37)
BASOPHILS # BLD: 0.1 K/UL (ref 0–0.2)
BASOPHILS NFR BLD: 0.8 %
BILIRUB SERPL-MCNC: 0.4 MG/DL (ref 0–1)
BUN SERPL-MCNC: 11 MG/DL (ref 7–20)
CALCIUM SERPL-MCNC: 10.1 MG/DL (ref 8.3–10.6)
CHLORIDE SERPL-SCNC: 103 MMOL/L (ref 99–110)
CHOLEST SERPL-MCNC: 158 MG/DL (ref 0–199)
CO2 SERPL-SCNC: 26 MMOL/L (ref 21–32)
CREAT SERPL-MCNC: 0.7 MG/DL (ref 0.6–1.1)
DEPRECATED RDW RBC AUTO: 14.4 % (ref 12.4–15.4)
EOSINOPHIL # BLD: 0.1 K/UL (ref 0–0.6)
EOSINOPHIL NFR BLD: 1.2 %
GFR SERPLBLD CREATININE-BSD FMLA CKD-EPI: >60 ML/MIN/{1.73_M2}
GLUCOSE SERPL-MCNC: 92 MG/DL (ref 70–99)
HCT VFR BLD AUTO: 37 % (ref 36–48)
HDLC SERPL-MCNC: 49 MG/DL (ref 40–60)
HGB BLD-MCNC: 12.5 G/DL (ref 12–16)
LDLC SERPL CALC-MCNC: 87 MG/DL
LYMPHOCYTES # BLD: 2.1 K/UL (ref 1–5.1)
LYMPHOCYTES NFR BLD: 30.9 %
MCH RBC QN AUTO: 28.5 PG (ref 26–34)
MCHC RBC AUTO-ENTMCNC: 33.9 G/DL (ref 31–36)
MCV RBC AUTO: 84.1 FL (ref 80–100)
MONOCYTES # BLD: 0.6 K/UL (ref 0–1.3)
MONOCYTES NFR BLD: 8.3 %
NEUTROPHILS # BLD: 3.9 K/UL (ref 1.7–7.7)
NEUTROPHILS NFR BLD: 58.8 %
PLATELET # BLD AUTO: 293 K/UL (ref 135–450)
PMV BLD AUTO: 9.8 FL (ref 5–10.5)
POTASSIUM SERPL-SCNC: 4.3 MMOL/L (ref 3.5–5.1)
PROT SERPL-MCNC: 7.3 G/DL (ref 6.4–8.2)
RBC # BLD AUTO: 4.4 M/UL (ref 4–5.2)
SODIUM SERPL-SCNC: 139 MMOL/L (ref 136–145)
T4 FREE SERPL-MCNC: 1.7 NG/DL (ref 0.9–1.8)
TRIGL SERPL-MCNC: 111 MG/DL (ref 0–150)
TSH SERPL DL<=0.005 MIU/L-ACNC: 3.49 UIU/ML (ref 0.27–4.2)
VLDLC SERPL CALC-MCNC: 22 MG/DL
WBC # BLD AUTO: 6.7 K/UL (ref 4–11)

## 2023-06-01 RX ORDER — TRIAMCINOLONE ACETONIDE OINTMENT USP, 0.05% 0.5 MG/G
OINTMENT TOPICAL 2 TIMES DAILY
COMMUNITY

## 2023-06-01 RX ORDER — PREDNISONE 10 MG/1
TABLET ORAL
Qty: 18 TABLET | Refills: 0 | Status: SHIPPED | OUTPATIENT
Start: 2023-06-01

## 2023-06-01 RX ORDER — FLUTICASONE PROPIONATE 50 MCG
2 SPRAY, SUSPENSION (ML) NASAL DAILY
Qty: 16 G | Refills: 3 | Status: SHIPPED | OUTPATIENT
Start: 2023-06-01

## 2023-06-01 ASSESSMENT — ENCOUNTER SYMPTOMS
SHORTNESS OF BREATH: 0
CONSTIPATION: 1
SORE THROAT: 0
WHEEZING: 0

## 2023-06-01 ASSESSMENT — PATIENT HEALTH QUESTIONNAIRE - PHQ9
1. LITTLE INTEREST OR PLEASURE IN DOING THINGS: 0
SUM OF ALL RESPONSES TO PHQ9 QUESTIONS 1 & 2: 0
SUM OF ALL RESPONSES TO PHQ QUESTIONS 1-9: 0
2. FEELING DOWN, DEPRESSED OR HOPELESS: 0
SUM OF ALL RESPONSES TO PHQ QUESTIONS 1-9: 0

## 2023-06-01 NOTE — PATIENT INSTRUCTIONS
[facility]  [facility] [address] [place] [address]       Moisturizer: cetaphil, [place]

## 2023-06-01 NOTE — PROGRESS NOTES
Patient ID: Thiago Ortez is a 56 y.o. female who presents today for a Physical Exam.      HPI  Here for physical exam   Red bumps on arms and around neck line-Tried claritin a few days and it made her dizzy so she stopped it. Tried triamcinolone but it did not help. Nothing new- no new foods. Etc. Checked be for any bugs/spiders and did not see anything. Past Medical History:   Diagnosis Date    HTN (hypertension)     Hypothyroidism        History reviewed. No pertinent surgical history. History reviewed. No pertinent family history. Social History     Socioeconomic History    Marital status:      Spouse name: None    Number of children: None    Years of education: None    Highest education level: None   Tobacco Use    Smoking status: Never    Smokeless tobacco: Never   Substance and Sexual Activity    Alcohol use: Yes     Comment: occ    Drug use: Never       Allergies   Allergen Reactions    Amoxicillin-Pot Clavulanate Other (See Comments)     Other reaction(s): Other (See Comments)  Fungus in her vagina  Fungus in her vagina      Iodine      Other reaction(s): Other (See Comments)  Per pt, big spots appears       Current Outpatient Medications   Medication Sig Dispense Refill    triamcinolone (KENALOG) 0.05 % OINT ointment Apply topically 2 times daily      predniSONE (DELTASONE) 10 MG tablet Take 3 tabs for days 1-3, take 2 tabs day 4-6, take 1 tab days 7-9. 18 tablet 0    fluticasone (FLONASE) 50 MCG/ACT nasal spray 2 sprays by Nasal route daily 16 g 3    atorvastatin (LIPITOR) 20 MG tablet Take 1 tablet by mouth nightly Take 1 tablet by mouth nightly 30 tablet 5    levothyroxine (SYNTHROID) 100 MCG tablet TAKE ONE TABLET BY MOUTH DAILY 90 tablet 2    diclofenac sodium (VOLTAREN) 1 % GEL Apply topically 2 times daily (Patient not taking: Reported on 6/9/2022) 150 g 1     No current facility-administered medications for this visit.        The patient's past medical history, past surgical

## 2023-06-22 ENCOUNTER — HOSPITAL ENCOUNTER (EMERGENCY)
Age: 48
Discharge: HOME | End: 2023-06-22
Payer: MEDICAID

## 2023-06-22 VITALS
RESPIRATION RATE: 16 BRPM | HEART RATE: 84 BPM | TEMPERATURE: 97.8 F | BODY MASS INDEX: 24.53 KG/M2 | OXYGEN SATURATION: 98 % | SYSTOLIC BLOOD PRESSURE: 119 MMHG | DIASTOLIC BLOOD PRESSURE: 73 MMHG | WEIGHT: 152 LBS

## 2023-06-22 DIAGNOSIS — S05.02XA ABRASION OF LEFT CORNEA, INITIAL ENCOUNTER: Primary | ICD-10-CM

## 2023-06-22 PROCEDURE — 6370000000 HC RX 637 (ALT 250 FOR IP): Performed by: PHYSICIAN ASSISTANT

## 2023-06-22 PROCEDURE — 99283 EMERGENCY DEPT VISIT LOW MDM: CPT

## 2023-06-22 RX ORDER — ERYTHROMYCIN 5 MG/G
OINTMENT OPHTHALMIC
Qty: 3.5 G | Refills: 0 | Status: SHIPPED | OUTPATIENT
Start: 2023-06-22 | End: 2023-07-02

## 2023-06-22 RX ORDER — ERYTHROMYCIN 5 MG/G
OINTMENT OPHTHALMIC ONCE
Status: COMPLETED | OUTPATIENT
Start: 2023-06-22 | End: 2023-06-22

## 2023-06-22 RX ORDER — TETRACAINE HYDROCHLORIDE 5 MG/ML
1 SOLUTION OPHTHALMIC ONCE
Status: COMPLETED | OUTPATIENT
Start: 2023-06-22 | End: 2023-06-22

## 2023-06-22 RX ADMIN — TETRACAINE HYDROCHLORIDE 1 DROP: 5 SOLUTION OPHTHALMIC at 21:47

## 2023-06-22 RX ADMIN — FLUORESCEIN SODIUM 1 MG: 1 STRIP OPHTHALMIC at 21:47

## 2023-06-22 RX ADMIN — ERYTHROMYCIN: 5 OINTMENT OPHTHALMIC at 22:16

## 2023-06-22 ASSESSMENT — PAIN DESCRIPTION - LOCATION: LOCATION: EYE

## 2023-06-22 ASSESSMENT — PAIN - FUNCTIONAL ASSESSMENT: PAIN_FUNCTIONAL_ASSESSMENT: 0-10

## 2023-06-22 ASSESSMENT — PAIN DESCRIPTION - PAIN TYPE: TYPE: ACUTE PAIN

## 2023-06-22 ASSESSMENT — VISUAL ACUITY
OU: 20/25
OU: 1
OS: 20/25
OD: 20/25

## 2023-06-22 ASSESSMENT — PAIN DESCRIPTION - ORIENTATION: ORIENTATION: LEFT

## 2023-06-22 ASSESSMENT — PAIN SCALES - GENERAL: PAINLEVEL_OUTOF10: 5

## 2023-06-23 NOTE — ED PROVIDER NOTES
[address] [facility]  ED  EMERGENCY DEPARTMENT ENCOUNTER        Pt Name: Fernandez Fung  MRN: [id number]  E[place]roslyn 1975  Date of evaluation: 6/22/2023  Provider: JEFF Luong  PCP: CLARE Fagan CNP  Note Started: 10:04 PM EDT 6/22/23      RAZIA. I have evaluated this patient. CHIEF COMPLAINT       Chief Complaint   Patient presents with    Eye Injury     States she was opening a box when she was scratched in the left eye with cardboard. HISTORY OF PRESENT ILLNESS: 1 or more Elements     History from : Patient    Limitations to history : None    Fernandez Fung is a 56 y.o. female who presents patient was evaluated in the emergency department today for concerns of corneal abrasion to her left eye. Patient states she was opening a cardboard box when it scratched her left eye. This happened around 630 this evening. She states her eye has hurt since then, worse with blinking and she feels like something is in her eye but does not believe that there is something in her eye. Not wear contacts or glasses. Nursing Notes were all reviewed and agreed with or any disagreements were addressed in the HPI. REVIEW OF SYSTEMS :      Review of Systems    Positives and Pertinent negatives as per HPI. SURGICAL HISTORY   History reviewed. No pertinent surgical history.     [address]       Discharge Medication List as of 6/22/2023 10:18 PM        CONTINUE these medications which have NOT CHANGED    Details   fexofenadine (ALLEGRA ALLERGY) 60 MG tablet Take 1 tablet by mouth dailyHistorical Med      predniSONE (DELTASONE) 10 MG tablet Take 3 tabs for days 1-3, take 2 tabs day 4-6, take 1 tab days 7-9., Disp-18 tablet, R-0Normal      triamcinolone (KENALOG) 0.05 % OINT ointment Apply topically 2 times daily, Topical, 2 TIMES DAILY, Historical Med      fluticasone (FLONASE) 50 MCG/ACT nasal spray 2 sprays by Nasal route daily, Disp-16 g, R-3Normal      atorvastatin

## 2023-06-23 NOTE — DISCHARGE INSTRUCTIONS
You were seen in the emergency department today for corneal abrasion. This is treated with antibiotic ointment for the next 3 to 5 days. Apply ointment 4 times a day. You may take ibuprofen or Tylenol for pain as needed. Follow-up with ophthalmology in the next 48 hours for reevaluation. If you develop any new or worsening symptoms return to the emergency department.

## 2023-07-03 LAB — NONINV COLON CA DNA+OCC BLD SCRN STL QL: NEGATIVE

## 2023-07-11 ENCOUNTER — HOSPITAL ENCOUNTER (OUTPATIENT)
Age: 48
Discharge: HOME | End: 2023-07-11
Payer: MEDICAID

## 2023-07-11 LAB
ALBUMIN SERPL-MCNC: 4.5 G/DL (ref 3.4–5)
ALBUMIN/GLOB SERPL: 1.5 {RATIO} (ref 1.1–2.2)
ALP SERPL-CCNC: 71 U/L (ref 40–129)
ALT SERPL-CCNC: 12 U/L (ref 10–40)
ANION GAP SERPL CALCULATED.3IONS-SCNC: 9 MMOL/L (ref 3–16)
AST SERPL-CCNC: 14 U/L (ref 15–37)
BASOPHILS # BLD: 0.1 K/UL (ref 0–0.2)
BASOPHILS NFR BLD: 0.7 %
BILIRUB SERPL-MCNC: <0.2 MG/DL (ref 0–1)
BUN SERPL-MCNC: 11 MG/DL (ref 7–20)
CALCIUM SERPL-MCNC: 9.8 MG/DL (ref 8.3–10.6)
CHLORIDE SERPL-SCNC: 104 MMOL/L (ref 99–110)
CO2 SERPL-SCNC: 26 MMOL/L (ref 21–32)
CREAT SERPL-MCNC: 0.7 MG/DL (ref 0.6–1.1)
DEPRECATED RDW RBC AUTO: 14.9 % (ref 12.4–15.4)
EOSINOPHIL # BLD: 0.1 K/UL (ref 0–0.6)
EOSINOPHIL NFR BLD: 1.4 %
GFR SERPLBLD CREATININE-BSD FMLA CKD-EPI: >60 ML/MIN/{1.73_M2}
GLUCOSE SERPL-MCNC: 85 MG/DL (ref 70–99)
HCT VFR BLD AUTO: 37.4 % (ref 36–48)
HGB BLD-MCNC: 12.5 G/DL (ref 12–16)
LYMPHOCYTES # BLD: 1.9 K/UL (ref 1–5.1)
LYMPHOCYTES NFR BLD: 26.1 %
MCH RBC QN AUTO: 28.6 PG (ref 26–34)
MCHC RBC AUTO-ENTMCNC: 33.6 G/DL (ref 31–36)
MCV RBC AUTO: 85.2 FL (ref 80–100)
MONOCYTES # BLD: 0.5 K/UL (ref 0–1.3)
MONOCYTES NFR BLD: 6.6 %
NEUTROPHILS # BLD: 4.7 K/UL (ref 1.7–7.7)
NEUTROPHILS NFR BLD: 65.2 %
PLATELET # BLD AUTO: 340 K/UL (ref 135–450)
PMV BLD AUTO: 9.6 FL (ref 5–10.5)
POTASSIUM SERPL-SCNC: 4.3 MMOL/L (ref 3.5–5.1)
PROT SERPL-MCNC: 7.5 G/DL (ref 6.4–8.2)
RBC # BLD AUTO: 4.39 M/UL (ref 4–5.2)
SODIUM SERPL-SCNC: 139 MMOL/L (ref 136–145)
WBC # BLD AUTO: 7.2 K/UL (ref 4–11)

## 2023-07-11 PROCEDURE — 86003 ALLG SPEC IGE CRUDE XTRC EA: CPT

## 2023-07-11 PROCEDURE — 85025 COMPLETE CBC W/AUTO DIFF WBC: CPT

## 2023-07-11 PROCEDURE — 36415 COLL VENOUS BLD VENIPUNCTURE: CPT

## 2023-07-11 PROCEDURE — 83520 IMMUNOASSAY QUANT NOS NONAB: CPT

## 2023-07-11 PROCEDURE — 84155 ASSAY OF PROTEIN SERUM: CPT

## 2023-07-11 PROCEDURE — 82785 ASSAY OF IGE: CPT

## 2023-07-11 PROCEDURE — 86334 IMMUNOFIX E-PHORESIS SERUM: CPT

## 2023-07-11 PROCEDURE — 84165 PROTEIN E-PHORESIS SERUM: CPT

## 2023-07-11 PROCEDURE — 80053 COMPREHEN METABOLIC PANEL: CPT

## 2023-07-12 LAB
ALBUMIN SERPL ELPH-MCNC: 3.8 G/DL (ref 3.1–4.9)
ALPHA1 GLOB SERPL ELPH-MCNC: 0.3 G/DL (ref 0.2–0.4)
ALPHA2 GLOB SERPL ELPH-MCNC: 1 G/DL (ref 0.4–1.1)
B-GLOBULIN SERPL ELPH-MCNC: 1.4 G/DL (ref 0.9–1.6)
GAMMA GLOB SERPL ELPH-MCNC: 1 G/DL (ref 0.6–1.8)
SPE/IFE INTERPRETATION: NORMAL
TRYPTASE SERPL-MCNC: 5.4 UG/L

## 2023-07-13 LAB — IGE SERPL-ACNC: 24 KU/L

## 2023-07-15 LAB — GLUTEN IGE QN: <0.1 KU/L (ref 0–0.34)

## 2023-08-16 DIAGNOSIS — E03.9 HYPOTHYROIDISM, UNSPECIFIED TYPE: ICD-10-CM

## 2023-08-16 RX ORDER — LEVOTHYROXINE SODIUM 0.1 MG/1
TABLET ORAL
Qty: 90 TABLET | Refills: 2 | Status: SHIPPED | OUTPATIENT
Start: 2023-08-16

## 2023-08-16 NOTE — TELEPHONE ENCOUNTER
Last Office Visit  -  6/14/23  Next Office Visit  -  NA    Last Filled  -  11/22/22  LEVOTHYROXINE 100 MCG TABLET

## 2023-09-19 ASSESSMENT — SOCIAL DETERMINANTS OF HEALTH (SDOH)
WITHIN THE LAST YEAR, HAVE TO BEEN RAPED OR FORCED TO HAVE ANY KIND OF SEXUAL ACTIVITY BY YOUR PARTNER OR EX-PARTNER?: NO
WITHIN THE LAST YEAR, HAVE YOU BEEN KICKED, HIT, SLAPPED, OR OTHERWISE PHYSICALLY HURT BY YOUR PARTNER OR EX-PARTNER?: NO
WITHIN THE LAST YEAR, HAVE YOU BEEN AFRAID OF YOUR PARTNER OR EX-PARTNER?: NO
WITHIN THE LAST YEAR, HAVE YOU BEEN HUMILIATED OR EMOTIONALLY ABUSED IN OTHER WAYS BY YOUR PARTNER OR EX-PARTNER?: NO

## 2023-09-22 ENCOUNTER — OFFICE VISIT (OUTPATIENT)
Dept: OBGYN CLINIC | Age: 48
End: 2023-09-22

## 2023-09-22 VITALS
WEIGHT: 155 LBS | DIASTOLIC BLOOD PRESSURE: 78 MMHG | TEMPERATURE: 98.1 F | HEART RATE: 83 BPM | OXYGEN SATURATION: 97 % | BODY MASS INDEX: 25.02 KG/M2 | SYSTOLIC BLOOD PRESSURE: 118 MMHG

## 2023-09-22 DIAGNOSIS — Z12.31 ENCOUNTER FOR SCREENING MAMMOGRAM FOR MALIGNANT NEOPLASM OF BREAST: ICD-10-CM

## 2023-09-22 DIAGNOSIS — Z12.4 SCREENING FOR CERVICAL CANCER: ICD-10-CM

## 2023-09-22 DIAGNOSIS — Z01.419 WOMEN'S ANNUAL ROUTINE GYNECOLOGICAL EXAMINATION: Primary | ICD-10-CM

## 2023-09-22 ASSESSMENT — ENCOUNTER SYMPTOMS
BACK PAIN: 1
CONSTIPATION: 1
BLOOD IN STOOL: 0
DIARRHEA: 0

## 2023-09-27 LAB
HPV HR 12 DNA SPEC QL NAA+PROBE: NOT DETECTED
HPV16 DNA SPEC QL NAA+PROBE: NOT DETECTED
HPV16+18+H RISK 12 DNA SPEC-IMP: NORMAL
HPV18 DNA SPEC QL NAA+PROBE: NOT DETECTED

## 2023-10-03 ENCOUNTER — OFFICE VISIT (OUTPATIENT)
Dept: FAMILY MEDICINE CLINIC | Age: 48
End: 2023-10-03
Payer: MEDICAID

## 2023-10-03 VITALS
WEIGHT: 154 LBS | DIASTOLIC BLOOD PRESSURE: 60 MMHG | HEART RATE: 89 BPM | BODY MASS INDEX: 24.86 KG/M2 | SYSTOLIC BLOOD PRESSURE: 100 MMHG | OXYGEN SATURATION: 98 %

## 2023-10-03 DIAGNOSIS — E78.00 HIGH CHOLESTEROL: Primary | ICD-10-CM

## 2023-10-03 DIAGNOSIS — E78.00 HIGH CHOLESTEROL: ICD-10-CM

## 2023-10-03 DIAGNOSIS — E03.9 HYPOTHYROIDISM, UNSPECIFIED TYPE: ICD-10-CM

## 2023-10-03 LAB
CHOLEST SERPL-MCNC: 252 MG/DL (ref 0–199)
HDLC SERPL-MCNC: 48 MG/DL (ref 40–60)
LDLC SERPL CALC-MCNC: 175 MG/DL
T4 FREE SERPL-MCNC: 1.8 NG/DL (ref 0.9–1.8)
TRIGL SERPL-MCNC: 144 MG/DL (ref 0–150)
TSH SERPL DL<=0.005 MIU/L-ACNC: 3.42 UIU/ML (ref 0.27–4.2)
VLDLC SERPL CALC-MCNC: 29 MG/DL

## 2023-10-03 PROCEDURE — G8420 CALC BMI NORM PARAMETERS: HCPCS | Performed by: NURSE PRACTITIONER

## 2023-10-03 PROCEDURE — G8427 DOCREV CUR MEDS BY ELIG CLIN: HCPCS | Performed by: NURSE PRACTITIONER

## 2023-10-03 PROCEDURE — G8484 FLU IMMUNIZE NO ADMIN: HCPCS | Performed by: NURSE PRACTITIONER

## 2023-10-03 PROCEDURE — 1036F TOBACCO NON-USER: CPT | Performed by: NURSE PRACTITIONER

## 2023-10-03 PROCEDURE — 99213 OFFICE O/P EST LOW 20 MIN: CPT | Performed by: NURSE PRACTITIONER

## 2023-10-03 ASSESSMENT — ENCOUNTER SYMPTOMS
WHEEZING: 0
SHORTNESS OF BREATH: 0
CONSTIPATION: 1
SORE THROAT: 0

## 2023-10-05 DIAGNOSIS — E78.00 HIGH CHOLESTEROL: ICD-10-CM

## 2023-10-05 RX ORDER — ATORVASTATIN CALCIUM 20 MG/1
20 TABLET, FILM COATED ORAL NIGHTLY
Qty: 30 TABLET | Refills: 5 | Status: SHIPPED | OUTPATIENT
Start: 2023-10-05 | End: 2023-11-04

## 2023-11-02 RX ORDER — PRAVASTATIN SODIUM 20 MG
20 TABLET ORAL DAILY
Qty: 30 TABLET | Refills: 4 | Status: SHIPPED | OUTPATIENT
Start: 2023-11-02

## 2023-11-16 ENCOUNTER — OFFICE VISIT (OUTPATIENT)
Dept: ORTHOPEDIC SURGERY | Age: 48
End: 2023-11-16
Payer: MEDICAID

## 2023-11-16 VITALS — BODY MASS INDEX: 24.77 KG/M2 | WEIGHT: 154.1 LBS | HEIGHT: 66 IN

## 2023-11-16 DIAGNOSIS — M43.02 CERVICAL SPONDYLOLYSIS: ICD-10-CM

## 2023-11-16 DIAGNOSIS — M47.22 CERVICAL SPONDYLOSIS WITH RADICULOPATHY: Primary | ICD-10-CM

## 2023-11-16 DIAGNOSIS — M54.2 NECK PAIN: ICD-10-CM

## 2023-11-16 PROCEDURE — G8420 CALC BMI NORM PARAMETERS: HCPCS | Performed by: ORTHOPAEDIC SURGERY

## 2023-11-16 PROCEDURE — G8484 FLU IMMUNIZE NO ADMIN: HCPCS | Performed by: ORTHOPAEDIC SURGERY

## 2023-11-16 PROCEDURE — 1036F TOBACCO NON-USER: CPT | Performed by: ORTHOPAEDIC SURGERY

## 2023-11-16 PROCEDURE — 99213 OFFICE O/P EST LOW 20 MIN: CPT | Performed by: ORTHOPAEDIC SURGERY

## 2023-11-16 PROCEDURE — G8427 DOCREV CUR MEDS BY ELIG CLIN: HCPCS | Performed by: ORTHOPAEDIC SURGERY

## 2023-11-16 NOTE — PROGRESS NOTES
New Patient: CERVICAL SPINE    Referring Provider:  No ref. provider found    CHIEF COMPLAINT:    Chief Complaint   Patient presents with    Follow-up     Cervical Follow-Up       HISTORY OF PRESENT ILLNESS:   Ms. Chuck Cardona is a pleasant 53 y.o. old female female kindly referred by Nathan SPARKS for the evaluation of neck pain, bilateral arm pain and low back pain. Her cervical symptoms began insidiously about a month ago. Those have increased since that time she rates her neck pain and arm pain 8/10. She reports 7/10 low back pain. She notes numbness in her arms. She has had low back pain for the last 20 years and has been diagnosed with scoliosis. She denies additional upper extremity symptoms, loss of fine motor control and gait abnormality. Current/Past Treatment:   Physical Therapy: yes  Chiropractic: Yes  Injection: No  Medications: Voltaren gel    Past Medical History:   Past Medical History:   Diagnosis Date    HTN (hypertension)     Hypothyroidism       Past Surgical History:     Past Surgical History:   Procedure Laterality Date    CARDIAC ELECTROPHYSIOLOGY STUDY AND ABLATION       SECTION      LAPAROSCOPY      myomectomy     Current Medications:     Current Outpatient Medications:     pravastatin (PRAVACHOL) 20 MG tablet, Take 1 tablet by mouth daily, Disp: 30 tablet, Rfl: 4    levothyroxine (SYNTHROID) 100 MCG tablet, TAKE ONE TABLET BY MOUTH DAILY, Disp: 90 tablet, Rfl: 2    fluticasone (FLONASE) 50 MCG/ACT nasal spray, 2 sprays by Nasal route daily, Disp: 16 g, Rfl: 3  Allergies:  Amoxicillin-pot clavulanate and Iodine  Social History:    reports that she has never smoked. She has never used smokeless tobacco. She reports current alcohol use of about 2.0 standard drinks of alcohol per week. She reports that she does not use drugs.   Family History:   Family History   Problem Relation Age of Onset    Diabetes Mother        REVIEW OF SYSTEMS: Full ROS noted & scanned

## 2023-11-29 ENCOUNTER — TELEPHONE (OUTPATIENT)
Dept: ORTHOPEDIC SURGERY | Age: 48
End: 2023-11-29

## 2023-11-29 NOTE — TELEPHONE ENCOUNTER
Called patient to touch base with her regarding her PT referral issues. Patient stated that [facility] was still stating that they were not receiving our faxes despite my sending them multiple times with fax confirmations. Patient stated that she will just continue PT with Adelia Reeder and the [facility] office as she had originally planned to do. I told patient that I would make sure there was still a valid script in there. Patient is still approved for multiple visits per her referral note.

## 2023-11-30 ENCOUNTER — HOSPITAL ENCOUNTER (OUTPATIENT)
Dept: PHYSICAL THERAPY | Age: 48
Setting detail: THERAPIES SERIES
Discharge: HOME | End: 2023-11-30
Payer: MEDICAID

## 2023-11-30 DIAGNOSIS — M54.12 CERVICAL RADICULOPATHY: ICD-10-CM

## 2023-11-30 DIAGNOSIS — M54.2 CERVICAL PAIN (NECK): Primary | ICD-10-CM

## 2023-11-30 DIAGNOSIS — M43.6 STIFFNESS OF CERVICAL SPINE: ICD-10-CM

## 2023-11-30 DIAGNOSIS — M62.9 MUSCULOSKELETAL DISORDER INVOLVING UPPER TRAPEZIUS MUSCLE: ICD-10-CM

## 2023-11-30 PROCEDURE — 97161 PT EVAL LOW COMPLEX 20 MIN: CPT | Performed by: PHYSICAL THERAPIST

## 2023-11-30 PROCEDURE — G0283 ELEC STIM OTHER THAN WOUND: HCPCS | Performed by: PHYSICAL THERAPIST

## 2023-11-30 PROCEDURE — 97110 THERAPEUTIC EXERCISES: CPT | Performed by: PHYSICAL THERAPIST

## 2023-11-30 NOTE — PLAN OF CARE
Arthritic conditions  [] Rheumatoid arthritis (M05.9)  [] Osteoarthritis (M19.91)  [] Gout        Neurological conditions  [] Prior Stroke (I69.30)  [] Parkinson's (G20)  [] Encephalopathy (G93.40)  [] Multiple Sclerosis (G35)  [] Post-polio (G14)  [] Spinal cord injury (SCI)  [] Traumatic brain injury (TBI)  [] ALS    Gastrointestinal conditions   [] Constipation (K59.00)  [] Chron's/ulcerative colitis    Endocrine conditions   [] Hypothyroid (E03.9)  [x] Hyperthyroid [] Hx of COVID    Musculoskeletal conditions  [] Disc pathology   [] Congenital spine pathologies   [] Osteoporosis (M81.8)  [] Osteopenia (M85.8)  [] Scoliosis    Cardio/Pulmonary conditions  [] Asthma (J45)  [] Coughing   [] COPD (J44.9)  [] CHF  [] A-fib          Rheumatological conditions  [] Fibromyalgia (M79.7)  [] Lupus  [] Sjogrens  [] Ankylosing spondylitis  [] Other autoimmune       Metabolic conditions  [] Morbid obesity (E66.01)  [] Diabetes type 1(E10.65) or 2 (E11.65)   [] Neuropathy (G60.9)        Cardiovascular conditions  [] Hypertension (I10)  [x] Hyperlipidemia (E78.5)  [] Angina pectoris (I20)  [] Atherosclerosis (I70)  [] Pacemaker/Defib  [x] Hx of CABG/stent/cardiac surgeries: ablation        Developmental Disorders  [] Autism (F84.0)  [] Cerebral Palsy (G80)  [] Down Syndrome (Q90.9)  [] Developmental delay     Psychological Disorders  [] Anxiety (F41.9)  [] Depression (F32.9)   [] Bipolar  [] Other:      Prior surgeries  [] Involved limb  [] Previous spinal surgery  []  section birth  [] Hysterectomy  [] Bowel / bladder surgery  [] Other relevant surgeries        Other conditions  [] Vertigo  [] Syncope  [] Kidney Failure  [] Cancer  [] Pregnancy  [] Incontinence   Other Co-morbidities not listed:       OBJECTIVE EXAMINATION     ROM/Strength: (Blank cells denote NT)    Mvmt (norm) AROM L AROM R Notes AROM L AROM R Notes               CERVICAL Flex (60) 30 (pulling on left and a little pain)       Ext (70) 45 (more

## 2023-11-30 NOTE — FLOWSHEET NOTE
[address] [facility]  [address] office: [phone] fax: [phone]      Physical Therapy: TREATMENT/PROGRESS NOTE   Patient: Reema Aguirre (89 y.o. female)   Treatment Date: 2023   :  1975 MRN: [id number]   Visit #: 1   Insurance Allowable Auth Needed   30 (14 used already) []Yes    [x]No    Insurance: Payor: Fermín Pearl / Plan: Dannie Phalen / Product Type: *No Product type* / $0 copay, pays 100%  Insurance ID: [id number] - (Medicaid Managed)  Secondary Insurance (if applicable):    Treatment Diagnosis:     ICD-10-CM    1. Cervical pain (neck)  M54.2       2. Musculoskeletal disorder involving upper trapezius muscle  M62.9       3. Stiffness of cervical spine  M43.6       4.  Cervical radiculopathy  M54.12          Medical Diagnosis:    Cervical spondylosis with radiculopathy [M47.22]  Cervical spondylolysis [M43.02]  Neck pain [M54.2]   Referring Physician: Israel Lucero MD  PCP: CLARE Cervantes CNP                             Plan of care signed (Y/N):     Date of Patient follow up with Physician:      Progress Report/POC: EVAL today  POC update due: (10 visits /OR [address], whichever is less)  2023     N/a (Cut and paste Precautions/Contraindications from Eval)    Preferred Language for Healthcare:   [x]English       []other:    SUBJECTIVE EXAMINATION     Patient Report/Comments: see eval     Test used Initial score  23   Pain Summary VAS 2-8/10    Functional questionnaire Neck Disability Index 42%    Other:                OBJECTIVE EXAMINATION     Observation:     Test measurements: see eval    Exercises/Interventions:     Therapeutic Ex (66880)  Sets/time Notes/Cues/Progressions   Patient education regarding findings, POC and HEP performed as below 15                                                 Manual Intervention ([phone]) TIME

## 2023-12-06 ENCOUNTER — HOSPITAL ENCOUNTER (OUTPATIENT)
Dept: PHYSICAL THERAPY | Age: 48
Setting detail: THERAPIES SERIES
Discharge: HOME | End: 2023-12-06
Payer: MEDICAID

## 2023-12-06 PROCEDURE — 97140 MANUAL THERAPY 1/> REGIONS: CPT | Performed by: PHYSICAL THERAPIST

## 2023-12-06 PROCEDURE — 97110 THERAPEUTIC EXERCISES: CPT | Performed by: PHYSICAL THERAPIST

## 2023-12-06 PROCEDURE — G0283 ELEC STIM OTHER THAN WOUND: HCPCS | Performed by: PHYSICAL THERAPIST

## 2023-12-06 NOTE — FLOWSHEET NOTE
techniques to upper trap stretches better. She does have moderately tight upper traps with ropiness and trigger points. We discussed dry needling and she is interested in this as she has had it in the past.  Dr. Brandi Castano was messaged to request a prescription for this technique. Medical Necessity Documentation:  I certify that this patient meets the below criteria necessary for medical necessity for care and/or justification of therapy services: The patient has functional impairments and/or activity limitations and would benefit from continued outpatient therapy services to address the deficits outlined in the patients goals  The patient has a musculoskeletal condition(s) with a corresponding ICD-10 code that is of complexity and severity that require skilled therapeutic intervention. This has a direct and significant impact on the need for therapy and significantly impacts the rate of recovery. Treatment/Activity Tolerance:  [x] Patient tolerated treatment well [] Patient limited by fatique  [] Patient limited by pain  [] Patient limited by other medical complications  [] Other:     Return to Play: NA    Prognosis for POC: [x] Good [] Fair  [] Poor    Patient requires continued skilled intervention: [x] Yes  [] No      GOALS     Patient stated goal: decrease pain  Status: [] Progressing: [] Met: [] Not Met: [] Adjusted    Therapist goals for Patient:   Short Term Goals: To be achieved in: 2 weeks  Independent in HEP and progression per patient tolerance, in order to progress toward full function and prevent re-injury. Status: [] Progressing: [] Met: [] Not Met: [] Adjusted  Patient will have a decrease in pain to 3/10 to help  facilitate improvement in movement, function, and ADLs as indicated by functional deficits. Status: [] Progressing: [] Met: [] Not Met: [] Adjusted    Long Term Goals:  To be achieved in: 4weeks  Disability index score of 30% or less for the Neck Disability Index to assist with

## 2023-12-07 ENCOUNTER — TELEPHONE (OUTPATIENT)
Dept: ORTHOPEDIC SURGERY | Age: 48
End: 2023-12-07

## 2023-12-07 NOTE — TELEPHONE ENCOUNTER
----- Message from Samson Franks ATC sent at 12/7/2023  3:21 PM EST -----  Can you help get a new order in for me?   ----- Message -----  From: Trinh Eduardo MD  Sent: 12/7/2023   2:46 PM EST  To: Samson Franks ATC; Gala Shultz MA    Please add dry needling to referral  ----- Message -----  From: Dieudonne Villanueva, PT  Sent: 12/6/2023  10:57 AM EST  To: MD Dr. Johanna Nelson  I am treating Yin Perdomo and she has significant bilateral upper trap trigger points.   I believe she is a good candidate for dry needling and she has had success with this modality in the past.  If you are in agreement, can you add it to the referral?      Thank you  Lorna Sheikh

## 2023-12-08 ENCOUNTER — HOSPITAL ENCOUNTER (OUTPATIENT)
Dept: PHYSICAL THERAPY | Age: 48
Setting detail: THERAPIES SERIES
Discharge: HOME | End: 2023-12-08
Payer: MEDICAID

## 2023-12-08 PROCEDURE — 97140 MANUAL THERAPY 1/> REGIONS: CPT | Performed by: PHYSICAL THERAPIST

## 2023-12-08 PROCEDURE — G0283 ELEC STIM OTHER THAN WOUND: HCPCS | Performed by: PHYSICAL THERAPIST

## 2023-12-08 PROCEDURE — 97110 THERAPEUTIC EXERCISES: CPT | Performed by: PHYSICAL THERAPIST

## 2023-12-08 NOTE — FLOWSHEET NOTE
[address] [facility]  [address] [place] [address] office: [phone] fax: [phone]      Physical Therapy: TREATMENT/PROGRESS NOTE   Patient: Geoff Larios (89 y.o. female)   Treatment Date: 2023   :  1975 MRN: [id number]   Visit #: 3   Insurance Allowable Auth Needed   30 (14 used already) []Yes    [x]No    Insurance: Payor: Vandana Bowles / Plan: Stephen Fuelling / Product Type: *No Product type* / $0 copay, pays 100%  Insurance ID: [id number] - (Medicaid Managed)  Secondary Insurance (if applicable):    Treatment Diagnosis:     ICD-10-CM    1. Cervical pain (neck)  M54.2       2. Musculoskeletal disorder involving upper trapezius muscle  M62.9       3. Stiffness of cervical spine  M43.6       4. Cervical radiculopathy  M54.12          Medical Diagnosis:    Cervical spondylosis with radiculopathy [M47.22]  Cervical spondylolysis [M43.02]  Neck pain [M54.2]   Referring Physician: Tripp Douglass MD  PCP: CLARE Pate CNP                             Plan of care signed (Y/N):     Date of Patient follow up with Physician: Yovani Casper     Progress Report/POC: NO  POC update due: (10 visits /OR [address], whichever is less)  2023     N/a (Cut and paste Precautions/Contraindications from [employer])    Preferred Language for Healthcare:   [x]English       []other:    SUBJECTIVE EXAMINATION     Patient Report/Comments: Patint reports the updated HEP is working out much better.   She was able to sleep with less discomfort but still has the numbness    Test used Initial score  23   Pain Summary VAS 2-8/10    Functional questionnaire Neck Disability Index 42%    Other:                OBJECTIVE EXAMINATION     Observation:     Test measurements: see eval    Exercises/Interventions:     Therapeutic Ex (84646)  Sets/time Notes/Cues/Progressions   Patient education regarding findings, POC

## 2023-12-11 ENCOUNTER — HOSPITAL ENCOUNTER (OUTPATIENT)
Dept: PHYSICAL THERAPY | Age: 48
Setting detail: THERAPIES SERIES
Discharge: HOME | End: 2023-12-11
Payer: MEDICAID

## 2023-12-11 PROCEDURE — 97110 THERAPEUTIC EXERCISES: CPT | Performed by: PHYSICAL THERAPIST

## 2023-12-11 PROCEDURE — G0283 ELEC STIM OTHER THAN WOUND: HCPCS | Performed by: PHYSICAL THERAPIST

## 2023-12-11 PROCEDURE — 97140 MANUAL THERAPY 1/> REGIONS: CPT | Performed by: PHYSICAL THERAPIST

## 2023-12-11 PROCEDURE — 20560 NDL INSJ W/O NJX 1 OR 2 MUSC: CPT | Performed by: PHYSICAL THERAPIST

## 2023-12-11 NOTE — FLOWSHEET NOTE
[address] [facility]  [address] office: [phone] fax: [phone]      Physical Therapy: TREATMENT/PROGRESS NOTE   Patient: Curvin Holstein (60 y.o. female)   Treatment Date: 2023   :  1975 MRN: [id number]   Visit #: 4   Insurance Allowable Auth Needed   30 (14 used already) []Yes    [x]No    Insurance: Payor: Isaias Jules / Plan: Aysha Powers / Product Type: *No Product type* / $0 copay, pays 100%  Insurance ID: [id number] - (Medicaid Managed)  Secondary Insurance (if applicable):    Treatment Diagnosis:     ICD-10-CM    1. Cervical pain (neck)  M54.2       2. Musculoskeletal disorder involving upper trapezius muscle  M62.9       3. Stiffness of cervical spine  M43.6       4. Cervical radiculopathy  M54.12          Medical Diagnosis:    Cervical spondylosis with radiculopathy [M47.22]  Cervical spondylolysis [M43.02]  Neck pain [M54.2]   Referring Physician: Jyotsna Weinstein MD  PCP: CLARE Barone CNP                             Plan of care signed (Y/N):     Date of Patient follow up with Physician: Sona Nation     Progress Report/POC: NO  POC update due: (10 visits /OR [address], whichever is less)  2023     N/a (Cut and paste Precautions/Contraindications from [employer])    Preferred Language for Healthcare:   [x]English       []other:    SUBJECTIVE EXAMINATION     Patient Report/Comments: Patint reports she feels at least 50% improved. She notices less pain when using computer mouse. She also notes numbness is the same.      Test used Initial score  23   Pain Summary VAS 2-8/10    Functional questionnaire Neck Disability Index 42%    Other:                OBJECTIVE EXAMINATION     Observation:     Test measurements: see eval    Exercises/Interventions:     Therapeutic Ex (01249)  Sets/time Notes/Cues/Progressions   Patient education regarding findings,

## 2023-12-15 ENCOUNTER — HOSPITAL ENCOUNTER (OUTPATIENT)
Dept: PHYSICAL THERAPY | Age: 48
Setting detail: THERAPIES SERIES
Discharge: HOME | End: 2023-12-15
Payer: MEDICAID

## 2023-12-15 PROCEDURE — 97140 MANUAL THERAPY 1/> REGIONS: CPT | Performed by: PHYSICAL THERAPIST

## 2023-12-15 PROCEDURE — G0283 ELEC STIM OTHER THAN WOUND: HCPCS | Performed by: PHYSICAL THERAPIST

## 2023-12-15 PROCEDURE — 97110 THERAPEUTIC EXERCISES: CPT | Performed by: PHYSICAL THERAPIST

## 2023-12-15 NOTE — FLOWSHEET NOTE
[address] [facility]  [address] office: [phone] fax: [phone]      Physical Therapy: TREATMENT/PROGRESS NOTE   Patient: Narendra Burger (8 y.o. female)   Treatment Date: 12/15/2023   :  1975 MRN: [id number]   Visit #: 5   Insurance Allowable Auth Needed   30 (14 used already) []Yes    [x]No    Insurance: Payor: Dom Menchaca / Plan: [employer] Chinchilla / Product Type: *No Product type* / $0 copay, pays 100%  Insurance ID: [id number] - (Medicaid Managed)  Secondary Insurance (if applicable):    Treatment Diagnosis:     ICD-10-CM    1. Cervical pain (neck)  M54.2       2. Musculoskeletal disorder involving upper trapezius muscle  M62.9       3. Stiffness of cervical spine  M43.6       4. Cervical radiculopathy  M54.12          Medical Diagnosis:    Cervical spondylosis with radiculopathy [M47.22]  Cervical spondylolysis [M43.02]  Neck pain [M54.2]   Referring Physician: Elena East MD  PCP: CLARE Gomez CNP                             Plan of care signed (Y/N):     Date of Patient follow up with Physician: Burton Bauer     Progress Report/POC: NO  POC update due: (10 visits /OR [address], whichever is less)  2023     N/a (Cut and paste Precautions/Contraindications from [employer])    Preferred Language for Healthcare:   [x]English       []other:    SUBJECTIVE EXAMINATION     Patient Report/Comments: Patint reports she was busy wrapping a lot of presents for [place] and would like to skip needling today.      Test used Initial score  11/30/23 12/15/2023   Pain Summary VAS 2-8/10    Functional questionnaire Neck Disability Index 42%    Other:                OBJECTIVE EXAMINATION     Observation:     Test measurements: see eval    Exercises/Interventions:     Therapeutic Ex (90389)  Sets/time Notes/Cues/Progressions   Patient education regarding findings, POC and HEP performed as below

## 2023-12-26 ENCOUNTER — HOSPITAL ENCOUNTER (OUTPATIENT)
Dept: PHYSICAL THERAPY | Age: 48
Setting detail: THERAPIES SERIES
Discharge: HOME | End: 2023-12-26
Payer: MEDICAID

## 2023-12-26 PROCEDURE — G0283 ELEC STIM OTHER THAN WOUND: HCPCS | Performed by: PHYSICAL THERAPIST

## 2023-12-26 PROCEDURE — 97110 THERAPEUTIC EXERCISES: CPT | Performed by: PHYSICAL THERAPIST

## 2023-12-26 PROCEDURE — 97140 MANUAL THERAPY 1/> REGIONS: CPT | Performed by: PHYSICAL THERAPIST

## 2023-12-26 NOTE — PLAN OF CARE
[address] [facility]  [address] [facility] [address] [facility], [address] [place] [address], [address] [place] [address] office: [phone] fax: [phone]  Physical Therapy Re-Certification Plan of Care    Dear Maury Leon MD  ,    We had the pleasure of treating the following patient for physical therapy services at [address] [place] [address]. A summary of our findings can be found in the updated assessment below. This includes our plan of care. If you have any questions or concerns regarding these findings, please do not hesitate to contact me at the office phone number checked above. Thank you for the referral.     Physician Signature:________________________________Date:__________________  By signing above (or electronic signature), therapist's plan is approved by physician      Functional Outcome:   Vida Sargent 1975 continues to present with functional deficits in strength symmetry and flexibility  limiting ability with carrying items and using computer all day  . During therapy this date, patient required visual cueing, verbal cueing, tactile cueing, modification of technique, progression of exercises and program, and manual interventions for exercise progression, modulating pain, increasing ROM, and improving postural awareness. Patient will continue to benefit from ongoing evaluation and advanced clinical decision from a Physical Therapist to improve pain control, ROM, and muscle strength to safely return to PLOF without symptoms or restrictions.     Overall Response to Treatment:   [x]Patient is responding well to treatment and improvement is noted with regards to goals   []Patient should continue to improve in reasonable time if they continue HEP   []Patient has plateaued and is no longer responding to skilled PT intervention    []Patient is getting worse and would benefit from return to referring MD   []Patient unable to adhere to initial POC   [x]Other: Patient reports feeling

## 2023-12-29 ENCOUNTER — HOSPITAL ENCOUNTER (OUTPATIENT)
Dept: PHYSICAL THERAPY | Age: 48
Setting detail: THERAPIES SERIES
Discharge: HOME | End: 2023-12-29
Payer: MEDICAID

## 2023-12-29 PROCEDURE — G0283 ELEC STIM OTHER THAN WOUND: HCPCS | Performed by: PHYSICAL THERAPIST

## 2023-12-29 PROCEDURE — 97110 THERAPEUTIC EXERCISES: CPT | Performed by: PHYSICAL THERAPIST

## 2023-12-29 PROCEDURE — 97140 MANUAL THERAPY 1/> REGIONS: CPT | Performed by: PHYSICAL THERAPIST

## 2023-12-29 NOTE — PLAN OF CARE
gloves along with hand  prior to inserting the needles. All needles where removed and discarded in the appropriate sharps container. MD has given verbal and/or written approval for this treatment. **      Attended electrical stimulation was applied in conjunction with dry needling to the sites listed in the chart above to help reduce muscle spasm and interrupt the pain cycle:  min at low frequency (1-20Hz), fine frequency (4Hz), low intensity (0-36mA), output  volts. (82816)       ** Educated patient on anatomy, trigger point etiology, expectations for TDN (bruising, soreness, etc), outcomes, and recommendations for exercise. **   Modalities:    Electrical Stimulation: PreMod Applied to Cervical Spine for pain modulation and symptom control for 15 minutes    Education/Home Exercise Program:     Access Code: [email or web address]  URL: [email or web address]  Date: 12/06/2023  Prepared by: Asha Hare    Exercises  - Supine Cervical Rotation AROM on Pillow  - 1-2 x daily - 7 x weekly - 10 reps  - Supine Cervical Retraction Passive Unloaded  - 3-4 x daily - 7 x weekly - 10 reps - 2 hold  - Seated Scapular Retraction  - 3-4 x daily - 7 x weekly - 3 sets - 10 reps  - Upper Trapezius Stretch  - 1-2 x daily - 7 x weekly - 5 reps - 10 hold  - Standing Lower Cervical and Upper Thoracic Stretch  - 1-2 x daily - 7 x weekly - 5 reps - 10 hold      ASSESSMENT     Today's Assessment: During therapy this date, patient required visual cueing, verbal cueing, tactile cueing, modification of technique, progression of exercises and program, and manual interventions for exercise progression, modulating pain, increasing ROM, improving soft tissue extensibility, and improving postural awareness. Patient will continue to benefit from ongoing evaluation and advanced clinical decision from a Physical Therapist to address and improve pain control, ROM, muscle strength, and proper body mechanics to safely return to PLOF without symptoms

## 2024-01-02 ENCOUNTER — APPOINTMENT (OUTPATIENT)
Dept: PHYSICAL THERAPY | Age: 49
End: 2024-01-02
Payer: MEDICAID

## 2024-01-03 ENCOUNTER — HOSPITAL ENCOUNTER (OUTPATIENT)
Dept: PHYSICAL THERAPY | Age: 49
Setting detail: THERAPIES SERIES
Discharge: HOME | End: 2024-01-03
Payer: MEDICAID

## 2024-01-03 PROCEDURE — 97110 THERAPEUTIC EXERCISES: CPT | Performed by: PHYSICAL THERAPIST

## 2024-01-03 PROCEDURE — 97140 MANUAL THERAPY 1/> REGIONS: CPT | Performed by: PHYSICAL THERAPIST

## 2024-01-03 PROCEDURE — G0283 ELEC STIM OTHER THAN WOUND: HCPCS | Performed by: PHYSICAL THERAPIST

## 2024-01-03 NOTE — FLOWSHEET NOTE
[facility]- Outpatient Rehabilitation and Therapy  [address] [facility]. [address], [place] [address] office: [phone] fax: [phone]        Physical Therapy: TREATMENT/PROGRESS NOTE   Patient: Elder Silva (48 y.o. female)   Treatment Date: 2024   :  1975 MRN: [id number]   Visit #: 10   Insurance Allowable Auth Needed   30 (14 used already) []Yes    [x]No    Insurance: Payor: [employer] [place] [employer] / Plan: [employer] [place] MED[employer] / Product Type: *No Product type* / $0 copay, pays 100%  Insurance ID: [id number] - (Medicaid Managed)  Secondary Insurance (if applicable):    Treatment Diagnosis:     ICD-10-CM    1. Cervical pain (neck)  M54.2       2. Musculoskeletal disorder involving upper trapezius muscle  M62.9       3. Stiffness of cervical spine  M43.6       4. Cervical radiculopathy  M54.12          Medical Diagnosis:    Cervical spondylosis with radiculopathy [M47.22]  Cervical spondylolysis [M43.02]  Neck pain [M54.2]   Referring Physician: John Amos MD  PCP: Debbie Morales APRN - STEFFANIE                             Plan of care signed (Y/N): Yes    Date of Patient follow up with Physician: Dandre     Progress Report/POC: NO  POC update due: (10 visits /OR AUTH LIMITS, whichever is less)  2023     N/a (Cut and paste Precautions/Contraindications from Eval)    Preferred Language for Healthcare:   [x]English       []other:    SUBJECTIVE EXAMINATION     Patient Report/Comments: Patient reports she had relief from the needling after last session.  The numbness is improving still.  The left hand is rarely numb.  She reports she feels numbing into either leg when she crosses leg for more than 15 min.  The sensation returns to normal when she uncrosses her legs. She wants to do session today without needles           Test used Initial score  23  POC   Pain Summary VAS 2-8/10 0-6/10   Functional questionnaire Neck Disability Index 42% 24%

## 2024-01-09 ENCOUNTER — HOSPITAL ENCOUNTER (OUTPATIENT)
Dept: PHYSICAL THERAPY | Age: 49
Setting detail: THERAPIES SERIES
Discharge: HOME | End: 2024-01-09
Payer: MEDICAID

## 2024-01-09 PROCEDURE — 97140 MANUAL THERAPY 1/> REGIONS: CPT | Performed by: PHYSICAL THERAPIST

## 2024-01-09 PROCEDURE — G0283 ELEC STIM OTHER THAN WOUND: HCPCS | Performed by: PHYSICAL THERAPIST

## 2024-01-09 PROCEDURE — 97110 THERAPEUTIC EXERCISES: CPT | Performed by: PHYSICAL THERAPIST

## 2024-01-09 NOTE — FLOWSHEET NOTE
function.    Status: [] Progressing: [x] Met: [] Not Met: [] Adjusted  Improve ROM to WNL to allow for proper joint functioning as indicated by patients functional deficits.  Status: [x] Progressing: [] Met: [] Not Met: [] Adjusted  Pt to improve strength to 4+/5 or better of deep cervical flexors to allow for proper muscle and joint use in functional mobility, ADLs and prior level of function   Status: [x] Progressing: [] Met: [] Not Met: [] Adjusted  Patient will return to Reaching activities without increased symptoms or restriction to work towards return to prior level of function.       Status: [x] Progressing: [] Met: [] Not Met: [] Adjusted  Patient will resume normal work/leisure activities without pain.            Status: [x] Progressing: [] Met: [] Not Met: [] Adjusted     Overall Progression Towards Functional goals/ Treatment Progress Update:  [x] Patient is progressing as expected towards functional goals listed.    [] Progression is slowed due to complexities/Impairments listed.  [] Progression has been slowed due to co-morbidities.  [] Plan just implemented, too soon (<30days) to assess goals progression   [] Goals require adjustment due to lack of progress  [] Patient is not progressing as expected and requires additional follow up with physician  [] Other:     CHARGE CAPTURE     PT CHARGE GRID   CPT Code (TIMED) minutes # CPT Code (UNTIMED) #     Therex (38860)  15 1  EVAL:LOW (83096 - Typically 20 minutes face-to-face)     Neuromusc. Re-ed (50842)    Re-Eval (63454)     Manual (18309) 25 2  Estim Unattended (07074) 1    Ther. Act (16609)    [facility]. Traction (40479)     Gait (10891)    Dry Needle 1-2 muscle (87070)     Aquatic Therex (01738)    Dry Needle 3+ muscle (20561)     Iontophoresis (06881)    VASO (28784)     Ultrasound (72776)    Group Therapy (39995)     Estim Attended (82046)    Canalith Repositioning (46003)     Other:    Other:    Total Timed Code Tx Minutes 40 3  1     Total Treatment

## 2024-01-16 ENCOUNTER — HOSPITAL ENCOUNTER (OUTPATIENT)
Dept: PHYSICAL THERAPY | Age: 49
Setting detail: THERAPIES SERIES
Discharge: HOME | End: 2024-01-16
Payer: MEDICAID

## 2024-01-16 PROCEDURE — 97110 THERAPEUTIC EXERCISES: CPT | Performed by: PHYSICAL THERAPIST

## 2024-01-16 PROCEDURE — 97140 MANUAL THERAPY 1/> REGIONS: CPT | Performed by: PHYSICAL THERAPIST

## 2024-01-16 PROCEDURE — G0283 ELEC STIM OTHER THAN WOUND: HCPCS | Performed by: PHYSICAL THERAPIST

## 2024-01-16 NOTE — FLOWSHEET NOTE
for the Neck Disability Index to assist with return to prior level of function.    Status: [] Progressing: [x] Met: [] Not Met: [] Adjusted  Improve ROM to WNL to allow for proper joint functioning as indicated by patients functional deficits.  Status: [x] Progressing: [] Met: [] Not Met: [] Adjusted  Pt to improve strength to 4+/5 or better of deep cervical flexors to allow for proper muscle and joint use in functional mobility, ADLs and prior level of function   Status: [x] Progressing: [] Met: [] Not Met: [] Adjusted  Patient will return to Reaching activities without increased symptoms or restriction to work towards return to prior level of function.       Status: [x] Progressing: [] Met: [] Not Met: [] Adjusted  Patient will resume normal work/leisure activities without pain.            Status: [x] Progressing: [] Met: [] Not Met: [] Adjusted     Overall Progression Towards Functional goals/ Treatment Progress Update:  [x] Patient is progressing as expected towards functional goals listed.    [] Progression is slowed due to complexities/Impairments listed.  [] Progression has been slowed due to co-morbidities.  [] Plan just implemented, too soon (<30days) to assess goals progression   [] Goals require adjustment due to lack of progress  [] Patient is not progressing as expected and requires additional follow up with physician  [] Other:     CHARGE CAPTURE     PT CHARGE GRID   CPT Code (TIMED) minutes # CPT Code (UNTIMED) #     Therex (80008)  15 1  EVAL:LOW (82913 - Typically 20 minutes face-to-face)     Neuromusc. Re-ed (63980)    Re-Eval (14059)     Manual (98281) 23 2  Estim Unattended (92891) 1    Ther. Act (53581)    Mech. Traction (80948)     Gait (20165)    Dry Needle 1-2 muscle (63352)     Aquatic Therex (71998)    Dry Needle 3+ muscle (83939)     Iontophoresis (54678)    VASO (11566)     Ultrasound (48713)    Group Therapy (40424)     Estim Attended (24644)    Canalith Repositioning (36949)     Other:

## 2024-01-18 NOTE — PROGRESS NOTES
Patient: Laura Morales is a 56 y.o. female who presents today with the following Chief Complaint(s):  Chief Complaint   Patient presents with    Fatigue     Unsteadiness, unable to do fast movements, comes and go but last week was worse     Hypothyroidism     Needs labs done         HPI  Feeling unsteady when tired and stressed- comes and goes- but has also noticed vision changes when she tries to read up close- blurry for a few seconds- advised to get eye exam  Constipation: uses miralax at times  Hypothyroid: has been stable on synthroid 100 mcg     Current Outpatient Medications   Medication Sig Dispense Refill    levothyroxine (SYNTHROID) 100 MCG tablet TAKE ONE TABLET BY MOUTH DAILY 90 tablet 2    fluticasone (FLONASE) 50 MCG/ACT nasal spray 2 sprays by Nasal route daily 16 g 3     No current facility-administered medications for this visit. Patient's past medical history, surgical history, family history, medications,  andallergies  were all reviewed and updated as appropriate today. Review of Systems   Constitutional:  Negative for chills and fever. HENT:  Negative for congestion and sore throat. Eyes:  Positive for visual disturbance. Respiratory:  Negative for shortness of breath and wheezing. Cardiovascular:  Negative for chest pain and palpitations. Gastrointestinal:  Positive for constipation. Genitourinary: Negative. Musculoskeletal: Negative. Skin: Negative. Neurological:  Negative for headaches. Feeling unsteady off and on   Psychiatric/Behavioral: Negative. Physical Exam  Vitals and nursing note reviewed. Constitutional:       Appearance: Normal appearance. She is well-developed. HENT:      Head: Normocephalic and atraumatic.       Right Ear: Tympanic membrane and external ear normal.      Left Ear: Tympanic membrane and external ear normal.      Nose: Nose normal.      Mouth/Throat:      Pharynx: No oropharyngeal exudate or posterior 60y male w/ pmh of HTN, DM, active tobacco use sent in for abnormal chest CT results done today. Patient has been having left lower chest pain for the past 3 days. Pain radiates superiorly, is not exertional, is not pleuritic. He has also had a dry cough and mild SOB. denies fever, palpitations, abd pain, N/V/D, urinary symptoms, black or bloody stool, leg pain or swelling. CT scan don't today showed large left sided pleural effusion and right sided lung nodules.

## 2024-01-23 ENCOUNTER — HOSPITAL ENCOUNTER (OUTPATIENT)
Dept: PHYSICAL THERAPY | Age: 49
Setting detail: THERAPIES SERIES
Discharge: HOME | End: 2024-01-23
Payer: MEDICAID

## 2024-01-23 PROCEDURE — G0283 ELEC STIM OTHER THAN WOUND: HCPCS | Performed by: PHYSICAL THERAPIST

## 2024-01-23 PROCEDURE — 97110 THERAPEUTIC EXERCISES: CPT | Performed by: PHYSICAL THERAPIST

## 2024-01-23 PROCEDURE — 97140 MANUAL THERAPY 1/> REGIONS: CPT | Performed by: PHYSICAL THERAPIST

## 2024-01-23 NOTE — FLOWSHEET NOTE
THERAPEUTIC EXERCISE - Provided verbal/tactile cueing for activities related to strengthening, flexibility, endurance, ROM performed to prevent loss of range of motion, maintain or improve muscular strength or increase flexibility, following either an injury or surgery.   (74824) MANUAL THERAPY -  Manual therapy techniques, 1 or more regions, each 15 minutes (Mobilization/manipulation, manual lymphatic drainage, manual traction) for the purpose of modulating pain, promoting relaxation,  increasing ROM, reducing/eliminating soft tissue swelling/inflammation/restriction, improving soft tissue extensibility and allowing for proper ROM for normal function with self care, mobility, lifting and ambulation  (84594) UNATTENDED ESTIM. Electrical stimulation (unattended), to 1 or more areas for indication(s) other than wound care, as part of a therapy plan of care. ([facility] )    TREATMENT PLAN   Plan: Cont POC-Patient will return in 2 weeks for reassessment    Electronically Signed by Leanna Loyola PT              Date: 01/23/2024     Note: If patient does not return for scheduled/recommended follow up visits, this note will serve as a discharge from care along with the most recent update on progress.

## 2024-01-30 ENCOUNTER — APPOINTMENT (OUTPATIENT)
Dept: PHYSICAL THERAPY | Age: 49
End: 2024-01-30
Payer: MEDICAID

## 2024-02-06 ENCOUNTER — HOSPITAL ENCOUNTER (OUTPATIENT)
Dept: PHYSICAL THERAPY | Age: 49
Setting detail: THERAPIES SERIES
Discharge: HOME | End: 2024-02-06
Payer: COMMERCIAL

## 2024-02-06 PROCEDURE — 97140 MANUAL THERAPY 1/> REGIONS: CPT | Performed by: PHYSICAL THERAPIST

## 2024-02-06 PROCEDURE — 97110 THERAPEUTIC EXERCISES: CPT | Performed by: PHYSICAL THERAPIST

## 2024-02-06 PROCEDURE — G0283 ELEC STIM OTHER THAN WOUND: HCPCS | Performed by: PHYSICAL THERAPIST

## 2024-02-06 NOTE — FLOWSHEET NOTE
[facility]- Outpatient Rehabilitation and Therapy  [address], [place] [address] office: [phone] fax: [phone]        Physical Therapy: TREATMENT/PROGRESS NOTE   Patient: Elder Silva (48 y.o. female)   Treatment Date: 2024   :  1975 MRN: [id number]   Visit #: 14   Insurance Allowable Auth Needed   30 (14 used already) []Yes    [x]No    Insurance: Payor: [employer] [place] [employer]ICA[employer] / Plan: [employer] [place] MEDICA / Product Type: *No Product type* / $0 copay, pays 100%  Insurance ID: [id number] - (Medicaid Managed)  Secondary Insurance (if applicable):    Treatment Diagnosis:     ICD-10-CM    1. Cervical pain (neck)  M54.2       2. Musculoskeletal disorder involving upper trapezius muscle  M62.9       3. Stiffness of cervical spine  M43.6       4. Cervical radiculopathy  M54.12          Medical Diagnosis:    Cervical spondylosis with radiculopathy [M47.22]  Cervical spondylolysis [M43.02]  Neck pain [M54.2]   Referring Physician: John Amos MD  PCP: Debbie Morales APRN - STEFFANIE                             Plan of care signed (Y/N): Yes    Date of Patient follow up with Physician: Dandre     Progress Report/POC: NO  POC update due: (10 visits /OR AUTH LIMITS, whichever is less)  2024    N/a (Cut and paste Precautions/Contraindications from Eval)    Preferred Language for Healthcare:   [x]English       []other:    SUBJECTIVE EXAMINATION     Patient Report/Comments: Patient reports she has been ok for the last couple of weeks.  She has been grading a lot on the computer, and has some numbness.  Overall feels about 80% improved       Test used Initial score  23  POC 24  D/C   Pain Summary VAS 2-8/10 0-6/10 0-5/10   Functional questionnaire Neck Disability Index 42% 24% 28%   Other: AROM       Cerv flex 30 50 50    ext 45 50 50    SBR 20 pulls left 35 min stretch 30    SBL 35 pinches left 50 min pain 30       OBJECTIVE

## 2024-03-05 ENCOUNTER — OFFICE VISIT (OUTPATIENT)
Dept: FAMILY MEDICINE CLINIC | Age: 49
End: 2024-03-05
Payer: MEDICAID

## 2024-03-05 VITALS
BODY MASS INDEX: 25.99 KG/M2 | OXYGEN SATURATION: 96 % | HEART RATE: 78 BPM | WEIGHT: 161 LBS | DIASTOLIC BLOOD PRESSURE: 70 MMHG | SYSTOLIC BLOOD PRESSURE: 110 MMHG

## 2024-03-05 DIAGNOSIS — J30.9 ALLERGIC RHINITIS, UNSPECIFIED SEASONALITY, UNSPECIFIED TRIGGER: ICD-10-CM

## 2024-03-05 DIAGNOSIS — E03.9 HYPOTHYROIDISM, UNSPECIFIED TYPE: Primary | ICD-10-CM

## 2024-03-05 DIAGNOSIS — E78.00 HIGH CHOLESTEROL: ICD-10-CM

## 2024-03-05 DIAGNOSIS — E61.1 IRON DEFICIENCY: ICD-10-CM

## 2024-03-05 DIAGNOSIS — R00.2 PALPITATIONS: ICD-10-CM

## 2024-03-05 LAB
25(OH)D3 SERPL-MCNC: 21.2 NG/ML
ALBUMIN SERPL-MCNC: 4.6 G/DL (ref 3.4–5)
ALBUMIN/GLOB SERPL: 1.9 {RATIO} (ref 1.1–2.2)
ALP SERPL-CCNC: 74 U/L (ref 40–129)
ALT SERPL-CCNC: 10 U/L (ref 10–40)
ANION GAP SERPL CALCULATED.3IONS-SCNC: 11 MMOL/L (ref 3–16)
AST SERPL-CCNC: 10 U/L (ref 15–37)
BASOPHILS # BLD: 0.1 K/UL (ref 0–0.2)
BASOPHILS NFR BLD: 1 %
BILIRUB SERPL-MCNC: 0.3 MG/DL (ref 0–1)
BUN SERPL-MCNC: 13 MG/DL (ref 7–20)
CALCIUM SERPL-MCNC: 10.1 MG/DL (ref 8.3–10.6)
CHLORIDE SERPL-SCNC: 103 MMOL/L (ref 99–110)
CHOLEST SERPL-MCNC: 176 MG/DL (ref 0–199)
CO2 SERPL-SCNC: 25 MMOL/L (ref 21–32)
CREAT SERPL-MCNC: 0.7 MG/DL (ref 0.6–1.1)
DEPRECATED RDW RBC AUTO: 14 % (ref 12.4–15.4)
EOSINOPHIL # BLD: 0.1 K/UL (ref 0–0.6)
EOSINOPHIL NFR BLD: 1.1 %
FERRITIN SERPL IA-MCNC: 10.3 NG/ML (ref 15–150)
GFR SERPLBLD CREATININE-BSD FMLA CKD-EPI: >60 ML/MIN/{1.73_M2}
GLUCOSE SERPL-MCNC: 97 MG/DL (ref 70–99)
HCT VFR BLD AUTO: 37.3 % (ref 36–48)
HDLC SERPL-MCNC: 44 MG/DL (ref 40–60)
HGB BLD-MCNC: 12.8 G/DL (ref 12–16)
IRON SATN MFR SERPL: 11 % (ref 15–50)
IRON SERPL-MCNC: 41 UG/DL (ref 37–145)
LDLC SERPL CALC-MCNC: 115 MG/DL
LYMPHOCYTES # BLD: 1.9 K/UL (ref 1–5.1)
LYMPHOCYTES NFR BLD: 27.2 %
MCH RBC QN AUTO: 29.1 PG (ref 26–34)
MCHC RBC AUTO-ENTMCNC: 34.4 G/DL (ref 31–36)
MCV RBC AUTO: 84.5 FL (ref 80–100)
MONOCYTES # BLD: 0.4 K/UL (ref 0–1.3)
MONOCYTES NFR BLD: 6.2 %
NEUTROPHILS # BLD: 4.6 K/UL (ref 1.7–7.7)
NEUTROPHILS NFR BLD: 64.5 %
PLATELET # BLD AUTO: 348 K/UL (ref 135–450)
PMV BLD AUTO: 10 FL (ref 5–10.5)
POTASSIUM SERPL-SCNC: 4.9 MMOL/L (ref 3.5–5.1)
PROT SERPL-MCNC: 7 G/DL (ref 6.4–8.2)
RBC # BLD AUTO: 4.42 M/UL (ref 4–5.2)
REJECTED TEST: NORMAL
SODIUM SERPL-SCNC: 139 MMOL/L (ref 136–145)
T4 FREE SERPL-MCNC: 1.9 NG/DL (ref 0.9–1.8)
TIBC SERPL-MCNC: 369 UG/DL (ref 260–445)
TRIGL SERPL-MCNC: 84 MG/DL (ref 0–150)
TSH SERPL DL<=0.005 MIU/L-ACNC: 0.46 UIU/ML (ref 0.27–4.2)
VIT B12 SERPL-MCNC: 315 PG/ML (ref 211–911)
VLDLC SERPL CALC-MCNC: 17 MG/DL
WBC # BLD AUTO: 7.1 K/UL (ref 4–11)

## 2024-03-05 PROCEDURE — G8484 FLU IMMUNIZE NO ADMIN: HCPCS | Performed by: NURSE PRACTITIONER

## 2024-03-05 PROCEDURE — G8419 CALC BMI OUT NRM PARAM NOF/U: HCPCS | Performed by: NURSE PRACTITIONER

## 2024-03-05 PROCEDURE — G8427 DOCREV CUR MEDS BY ELIG CLIN: HCPCS | Performed by: NURSE PRACTITIONER

## 2024-03-05 PROCEDURE — 1036F TOBACCO NON-USER: CPT | Performed by: NURSE PRACTITIONER

## 2024-03-05 PROCEDURE — 99214 OFFICE O/P EST MOD 30 MIN: CPT | Performed by: NURSE PRACTITIONER

## 2024-03-05 PROCEDURE — 36415 COLL VENOUS BLD VENIPUNCTURE: CPT | Performed by: NURSE PRACTITIONER

## 2024-03-05 RX ORDER — FLUTICASONE PROPIONATE 50 MCG
2 SPRAY, SUSPENSION (ML) NASAL DAILY
Qty: 16 G | Refills: 3 | Status: SHIPPED | OUTPATIENT
Start: 2024-03-05

## 2024-03-05 ASSESSMENT — PATIENT HEALTH QUESTIONNAIRE - PHQ9
SUM OF ALL RESPONSES TO PHQ9 QUESTIONS 1 & 2: 0
SUM OF ALL RESPONSES TO PHQ QUESTIONS 1-9: 0
1. LITTLE INTEREST OR PLEASURE IN DOING THINGS: 0
2. FEELING DOWN, DEPRESSED OR HOPELESS: NOT AT ALL
SUM OF ALL RESPONSES TO PHQ QUESTIONS 1-9: 0
SUM OF ALL RESPONSES TO PHQ9 QUESTIONS 1 & 2: 0
1. LITTLE INTEREST OR PLEASURE IN DOING THINGS: NOT AT ALL
2. FEELING DOWN, DEPRESSED OR HOPELESS: 0

## 2024-03-05 ASSESSMENT — ENCOUNTER SYMPTOMS
SHORTNESS OF BREATH: 0
WHEEZING: 0

## 2024-03-05 NOTE — PROGRESS NOTES
Patient: Elder Silva is a 48 y.o. female who presents today with the following Chief Complaint(s):  Chief Complaint   Patient presents with    6 Month Follow-Up     Would like blood work done, iron, vitamins,          HPI  Here for follow up on labs  High cholesterol: taking pravastatin 20mg daily- tolerating this med much better than the lipitor  Hypothyroid: levothyroxine 100 mcg daily  Allergies: taking flonase- needs refill  She states in the past her iron has been borderline low= would like iron levels and Vit D checked today  History of SVT: saw cardiology- palpitations off and on recently- not lasting long- will check labs- may need to follow up with cardiology- she is not taking metoprolol- had a cardiac ablation in the past     Current Outpatient Medications   Medication Sig Dispense Refill    fluticasone (FLONASE) 50 MCG/ACT nasal spray 2 sprays by Nasal route daily 16 g 3    pravastatin (PRAVACHOL) 20 MG tablet Take 1 tablet by mouth daily 30 tablet 4    levothyroxine (SYNTHROID) 100 MCG tablet TAKE ONE TABLET BY MOUTH DAILY 90 tablet 2     No current facility-administered medications for this visit.       Patient's past medical history, surgical history, family history, medications,  andallergies  were all reviewed and updated as appropriate today.      Review of Systems   Constitutional:  Negative for chills and fever.   Respiratory:  Negative for shortness of breath and wheezing.    Cardiovascular:  Positive for palpitations (off and on- don't last long). Negative for chest pain.         Physical Exam  Vitals and nursing note reviewed.   Constitutional:       Appearance: Normal appearance. She is well-developed.   HENT:      Head: Normocephalic and atraumatic.      Right Ear: External ear normal.      Left Ear: External ear normal.   Eyes:      Conjunctiva/sclera: Conjunctivae normal.   Cardiovascular:      Rate and Rhythm: Normal rate and regular rhythm.      Heart sounds: Normal heart sounds. No

## 2024-03-06 DIAGNOSIS — E03.9 HYPOTHYROIDISM, UNSPECIFIED TYPE: ICD-10-CM

## 2024-03-06 RX ORDER — LEVOTHYROXINE SODIUM 88 UG/1
88 TABLET ORAL DAILY
Qty: 90 TABLET | Refills: 0 | Status: SHIPPED | OUTPATIENT
Start: 2024-03-06

## 2024-03-07 ENCOUNTER — TELEPHONE (OUTPATIENT)
Dept: FAMILY MEDICINE CLINIC | Age: 49
End: 2024-03-07

## 2024-03-07 NOTE — TELEPHONE ENCOUNTER
Ena called to advised that the B12 w/folate was rejected the folate part of the test was rejected ,B-12 was completed .DWIGHT

## 2024-03-15 ENCOUNTER — TELEMEDICINE (OUTPATIENT)
Dept: FAMILY MEDICINE CLINIC | Age: 49
End: 2024-03-15
Payer: MEDICAID

## 2024-03-15 DIAGNOSIS — J02.0 ACUTE STREPTOCOCCAL PHARYNGITIS: Primary | ICD-10-CM

## 2024-03-15 PROCEDURE — G8427 DOCREV CUR MEDS BY ELIG CLIN: HCPCS | Performed by: STUDENT IN AN ORGANIZED HEALTH CARE EDUCATION/TRAINING PROGRAM

## 2024-03-15 PROCEDURE — 99213 OFFICE O/P EST LOW 20 MIN: CPT | Performed by: STUDENT IN AN ORGANIZED HEALTH CARE EDUCATION/TRAINING PROGRAM

## 2024-03-15 RX ORDER — CEPHALEXIN 500 MG/1
500 CAPSULE ORAL 2 TIMES DAILY
Qty: 20 CAPSULE | Refills: 0 | Status: SHIPPED | OUTPATIENT
Start: 2024-03-15 | End: 2024-03-25

## 2024-03-15 NOTE — PROGRESS NOTES
Elder Silva, was evaluated through a synchronous (real-time) audio-video encounter. The patient (or guardian if applicable) is aware that this is a billable service, which includes applicable co-pays. This Virtual Visit was conducted with patient's (and/or legal guardian's) consent. Patient identification was verified, and a caregiver was present when appropriate.   The patient was located at Home: [address] [address]  Provider was located at Facility (Appt Dept): [address] [place], [place] [address] [place] [address]      Elder Silva (:  1975) is a Established patient, presenting virtually for evaluation of the following:    Assessment & Plan   Below is the assessment and plan developed based on review of pertinent history, physical exam, labs, studies, and medications.  1. Acute streptococcal pharyngitis  -     cephALEXin (KEFLEX) 500 MG capsule; Take 1 capsule by mouth 2 times daily for 10 days, Disp-20 capsule, R-0Normal  Patient with multiple positive Centor criteria in addition to recent exposure to someone with test positive strep throat.  Because of this, it is reasonable to do empiric antibiotics with presumed positive strep pharyngitis at this time.  Will prescribe Keflex given patient's history of intolerance of Augmentin.    No follow-ups on file.       Subjective   Pharyngitis    Patient is a 48-year-old female, who presents over the telephone for A/V communication to discuss recent ill symptoms.  Patient reports that she has had 3 days of symptoms.  She reports that she feels like she has a sinus infection and has foul smelling nasal discharge.  She has noted a sore throat, over the redness in the back of her throat and the loss of voice.  She feels like she has a warm feeling, but does not have a fever.  Daughter is currently on antibiotics with test positive strep throat.  Patient is hoping to get on antibiotics as well.  Patient does note that she has an allergy to

## 2024-04-22 RX ORDER — PRAVASTATIN SODIUM 20 MG
TABLET ORAL
Qty: 30 TABLET | Refills: 4 | Status: SHIPPED | OUTPATIENT
Start: 2024-04-22

## 2024-05-28 DIAGNOSIS — E03.9 HYPOTHYROIDISM, UNSPECIFIED TYPE: ICD-10-CM

## 2024-05-28 RX ORDER — LEVOTHYROXINE SODIUM 88 UG/1
88 TABLET ORAL DAILY
Qty: 90 TABLET | Refills: 3 | Status: SHIPPED | OUTPATIENT
Start: 2024-05-28

## 2024-06-26 ENCOUNTER — TELEPHONE (OUTPATIENT)
Dept: ORTHOPEDIC SURGERY | Age: 49
End: 2024-06-26

## 2024-06-26 DIAGNOSIS — M54.2 NECK PAIN: ICD-10-CM

## 2024-06-26 DIAGNOSIS — M47.22 CERVICAL SPONDYLOSIS WITH RADICULOPATHY: Primary | ICD-10-CM

## 2024-06-26 DIAGNOSIS — M41.9 SCOLIOSIS OF THORACIC SPINE, UNSPECIFIED SCOLIOSIS TYPE: ICD-10-CM

## 2024-06-26 DIAGNOSIS — M43.02 CERVICAL SPONDYLOLYSIS: ICD-10-CM

## 2024-06-26 DIAGNOSIS — M51.36 DDD (DEGENERATIVE DISC DISEASE), LUMBAR: ICD-10-CM

## 2024-06-26 NOTE — TELEPHONE ENCOUNTER
Called patient who stated that physical therapy has been going well and would like to continue. A new order for physical therapy was placed and patient advised to call to schedule.

## 2024-06-26 NOTE — TELEPHONE ENCOUNTER
General Question     Subject: REFERRAL PT FOR NECK TO [facility]   Patient and /or Facility Request: Elder Silva   Contact Number: [phone]     PATIENT CALLED IN TO SEE IF SHE CAN GET ANOTHER REFERRAL FOR PHYSICAL THERAPY AT [facility] LOCATION FOR HER NECK. PATIENT REQ A CALL BACK...     PLEASE ADVISE

## 2024-07-19 ENCOUNTER — HOSPITAL ENCOUNTER (OUTPATIENT)
Dept: PHYSICAL THERAPY | Age: 49
Setting detail: THERAPIES SERIES
Discharge: HOME | End: 2024-07-19
Payer: COMMERCIAL

## 2024-07-19 DIAGNOSIS — M43.6 NECK STIFFNESS: ICD-10-CM

## 2024-07-19 DIAGNOSIS — M54.12 CERVICAL RADICULITIS: ICD-10-CM

## 2024-07-19 DIAGNOSIS — M54.2 CERVICAL MUSCLE PAIN: Primary | ICD-10-CM

## 2024-07-19 PROCEDURE — 97140 MANUAL THERAPY 1/> REGIONS: CPT | Performed by: PHYSICAL THERAPIST

## 2024-07-19 PROCEDURE — 97110 THERAPEUTIC EXERCISES: CPT | Performed by: PHYSICAL THERAPIST

## 2024-07-19 PROCEDURE — 97161 PT EVAL LOW COMPLEX 20 MIN: CPT | Performed by: PHYSICAL THERAPIST

## 2024-07-19 NOTE — PLAN OF CARE
corresponding ICD-10 code that is of complexity and severity that require skilled therapeutic intervention. This has a direct and significant impact on the need for therapy and significantly impacts the rate of recovery.   The patient has a complexity identified by an ICD-10 code that has a direct and significant impact on the need for therapy.  (Significantly impacts the rate of recovery and is associated with a primary condition.)     Return to Play: NA    Prognosis for POC: [x] Good [] Fair  [] Poor    Patient requires continued skilled intervention: [x] Yes  [] No      CHARGE CAPTURE     PT CHARGE GRID   CPT Code (TIMED) minutes # CPT Code (UNTIMED) #     Therex (48739)  10 1  EVAL:LOW (01086 - Typically 20 minutes face-to-face) 1    Neuromusc. Re-ed (29879)    Re-Eval (21002)     Manual (17098) 15 1  Estim Unattended (62624)     Ther. Act (08263)    Mech. Traction (15355)     Gait (52819)    Dry Needle 1-2 muscle (20560)     Aquatic Therex (96976)    Dry Needle 3+ muscle (20561)     Iontophoresis (12633)    VASO (55498)     Ultrasound (04574)    Group Therapy (08202)     Estim Attended (97969)    Canalith Repositioning (17993)     Other:    Other:    Total Timed Code Tx Minutes 25 2  1     Total Treatment Minutes 50        Charge Justification:  (59597) THERAPEUTIC EXERCISE - Provided verbal/tactile cueing for activities related to strengthening, flexibility, endurance, ROM performed to prevent loss of range of motion, maintain or improve muscular strength or increase flexibility, following either an injury or surgery.   (85863) MANUAL THERAPY -  Manual therapy techniques, 1 or more regions, each 15 minutes (Mobilization/manipulation, manual lymphatic drainage, manual traction) for the purpose of modulating pain, promoting relaxation,  increasing ROM, reducing/eliminating soft tissue swelling/inflammation/restriction, improving soft tissue extensibility and allowing for proper ROM for normal function with self

## 2024-07-24 ENCOUNTER — HOSPITAL ENCOUNTER (OUTPATIENT)
Dept: WOMENS IMAGING | Age: 49
Discharge: HOME | End: 2024-07-24
Payer: COMMERCIAL

## 2024-07-24 ENCOUNTER — HOSPITAL ENCOUNTER (OUTPATIENT)
Dept: PHYSICAL THERAPY | Age: 49
Setting detail: THERAPIES SERIES
Discharge: HOME | End: 2024-07-24
Payer: COMMERCIAL

## 2024-07-24 VITALS — WEIGHT: 136 LBS | BODY MASS INDEX: 21.86 KG/M2 | HEIGHT: 66 IN

## 2024-07-24 DIAGNOSIS — Z12.31 SCREENING MAMMOGRAM FOR BREAST CANCER: ICD-10-CM

## 2024-07-24 PROCEDURE — 97110 THERAPEUTIC EXERCISES: CPT | Performed by: PHYSICAL THERAPIST

## 2024-07-24 PROCEDURE — 97140 MANUAL THERAPY 1/> REGIONS: CPT | Performed by: PHYSICAL THERAPIST

## 2024-07-24 PROCEDURE — 77063 BREAST TOMOSYNTHESIS BI: CPT

## 2024-07-24 NOTE — FLOWSHEET NOTE
[facility]- Outpatient Rehabilitation and Therapy  [address], [place] [address] office: [phone] fax: [phone]         Physical Therapy: TREATMENT/PROGRESS NOTE   Patient: Elder Silva (48 y.o. female)   Examination Date: 2024   :  1975 MRN: [id number]   Visit #: 2   Insurance Allowable Auth Needed   Med nec [x]Yes    []No    Insurance: Payor: ALYX / Plan: ALYX HERNANDEZ [place] / Product Type: *No Product type* / Insurance:ALYX BABITAKENNEDI  Ded: 7250/ 570.08  OOP: 7250/ 570.08  Codes Billable: YES/ NOT 36874  Copay: 0  Coins: 0  TeleHealth: YES  Visit Limit: BMN  Auth Req: YES/ ANTONIETA   Ref #: AL G / [id number]  Insurance ID: [id number] - (Commercial)  Secondary Insurance (if applicable): [employer] O*   Treatment Diagnosis:     ICD-10-CM    1. Cervical muscle pain  M54.2       2. Neck stiffness  M43.6       3. Cervical radiculitis  M54.12          Medical Diagnosis:  Cervical spondylosis with radiculopathy [M47.22]  DDD (degenerative disc disease), lumbar [M51.36]  Scoliosis of thoracic spine, unspecified scoliosis type [M41.9]  Cervical spondylolysis [M43.02]  Neck pain [M54.2]   Referring Physician: John Amos MD  PCP: Debbie Morales, CLARE - CNP     Plan of care signed (Y/N):     Date of Patient follow up with Physician:      Progress Report/POC: NO  POC update due: (10 visits /OR AUTH LIMITS, whichever is less)  2024                                             Precautions/ Contra-indications:           Latex allergy:  NO  Pacemaker:    NO  Contraindications for Manipulation:  none  Date of Surgery: n/a  Other: has moderate S shaped scoliosis (T spine convex curve to the right)    Red Flags:  None    C-SSRS Triggered by Intake questionnaire:   Patient answered 'NO' to both behavioral questions on intake.  No further screening warranted    Preferred Language for Healthcare:   [x] English       [] other:    SUBJECTIVE EXAMINATION

## 2024-07-26 ENCOUNTER — HOSPITAL ENCOUNTER (OUTPATIENT)
Dept: PHYSICAL THERAPY | Age: 49
Setting detail: THERAPIES SERIES
Discharge: HOME | End: 2024-07-26
Payer: COMMERCIAL

## 2024-07-26 PROCEDURE — 97110 THERAPEUTIC EXERCISES: CPT | Performed by: PHYSICAL THERAPIST

## 2024-07-26 PROCEDURE — G0283 ELEC STIM OTHER THAN WOUND: HCPCS | Performed by: PHYSICAL THERAPIST

## 2024-07-26 PROCEDURE — 97140 MANUAL THERAPY 1/> REGIONS: CPT | Performed by: PHYSICAL THERAPIST

## 2024-07-26 NOTE — FLOWSHEET NOTE
re-education (76027) Sets/time Notes/Cues/Progressions   Pt education regarding findings, POC, and HEP performed and discussed as below Also discussed towel roll in pillow case        Airdyne arms 5 min         Supine chin tuck 10 sec 10x    Supine horiz abd Yellow x10    Supine D2 flex each arm Yellow x10    Supine lat pulls (PT holding band) Yellow x10         TruStretch 2 D pect stretches: 90/90 rotations and 60 deg abd rotations X10 each way, each arm         The Finisher ladder, thread the needle, arc 4# x12 each bilat    Corner pect stretch Had feeling of numbness in hands at end of each stretch             Manual Intervention (29754) TIME    1st rib mobs/ cervical lateral glides/ cervical distraction/ cervical stretches/ rotational stretches with mobs/ upper strap and scalene stretches 15 min          Prone t spine mobilizations and 2nd rib mobs 8 min              Therapeutic Activity (71256) Sets/time                Modalities:    Hot Pack x10 min at end of session    Education/Home Exercise Program:   Access Code: [email or web address]  URL: [email or web address]  Date: 07/19/2024  Prepared by: Leanna Loyola    Exercises  - Supine Cervical Rotation AROM on Pillow  - 1-2 x daily - 7 x weekly - 10 reps  - Supine Chin Tuck  - 1-2 x daily - 7 x weekly - 10 reps - 3 hold  - Upper Trapezius Stretch  - 1-2 x daily - 7 x weekly - 5 reps - 10 hold  - Standing Lower Cervical and Upper Thoracic Stretch  - 1-2 x daily - 7 x weekly - 5 reps - 10 hold  - Standing Row with Anchored Resistance Band with PLB  - 1 x daily - 7 x weekly - 3 sets - 10 reps      ASSESSMENT     Today's Assessment: Patient had good tolerance to today's session, reporting reduced pain, muscular fatigue, and increased soreness with program completed. Able to progress  intensity, resistance, exercise difficulty, and repetitions on all activity. Continues to display deficits in tightness and stiffness which required ongoing skilled physical therapy and

## 2024-07-29 ENCOUNTER — HOSPITAL ENCOUNTER (OUTPATIENT)
Dept: PHYSICAL THERAPY | Age: 49
Setting detail: THERAPIES SERIES
Discharge: HOME | End: 2024-07-29
Payer: COMMERCIAL

## 2024-07-29 PROCEDURE — G0283 ELEC STIM OTHER THAN WOUND: HCPCS | Performed by: PHYSICAL THERAPIST

## 2024-07-29 PROCEDURE — 97110 THERAPEUTIC EXERCISES: CPT | Performed by: PHYSICAL THERAPIST

## 2024-07-29 PROCEDURE — 97140 MANUAL THERAPY 1/> REGIONS: CPT | Performed by: PHYSICAL THERAPIST

## 2024-07-29 NOTE — FLOWSHEET NOTE
Adjusted  2. Patient will demonstrate increased AROM of left side bend to 25 deg without pain to allow for proper joint functioning to enable patient to have normal cervical motion .   [] Progressing: [] Met: [] Not Met: [] Adjusted  3. Patient will return to sitting at her computer for 30 min without increased symptoms or restriction.   [] Progressing: [] Met: [] Not Met: [] Adjusted  4. Patient will report decrease in radicular symptoms by 50% during sleep(patient specific functional goal)    [] Progressing: [x] Met: [] Not Met: [] Adjusted     Overall Progression Towards Functional goals/ Treatment Progress Update:  [x] Patient is progressing as expected towards functional goals listed.    [] Progression is slowed due to complexities/Impairments listed.  [] Progression has been slowed due to co-morbidities.  [] Plan just implemented, too soon (<30days) to assess goals progression   [] Goals require adjustment due to lack of progress  [] Patient is not progressing as expected and requires additional follow up with physician  [] Other:     TREATMENT PLAN     Frequency/Duration: 2x/week for 4-6 weeks for the following treatment interventions:    Interventions:  Therapeutic Exercise (95469) including: strength training, ROM, and functional mobility  Manual Therapy (66652) as indicated to include: Passive Range of Motion, Gr I-IV mobilizations, Soft Tissue Mobilization, Dry Needling/IASTM, and Trigger Point Release  Modalities as needed that may include: Thermal Agents  Patient education on postural re-education, activity modification, and progression of HEP    Plan: Cont POC- Continue emphasis/focus on exercise progression, modulating pain, increasing ROM, and improving postural awareness. Next visit plan to progress reps and add new exercises     Electronically Signed by Leanna Loyola PT  Date: 07/29/2024     Note: Portions of this note have been templated and/or copied from initial evaluation, reassessments and  What Type Of Note Output Would You Prefer (Optional)?: Standard Output How Severe Is Your Rash?: moderate Is This A New Presentation, Or A Follow-Up?: Rash

## 2024-08-02 ENCOUNTER — HOSPITAL ENCOUNTER (OUTPATIENT)
Dept: PHYSICAL THERAPY | Age: 49
Setting detail: THERAPIES SERIES
Discharge: HOME | End: 2024-08-02
Payer: COMMERCIAL

## 2024-08-02 PROCEDURE — 97140 MANUAL THERAPY 1/> REGIONS: CPT | Performed by: PHYSICAL THERAPIST

## 2024-08-02 PROCEDURE — G0283 ELEC STIM OTHER THAN WOUND: HCPCS | Performed by: PHYSICAL THERAPIST

## 2024-08-02 PROCEDURE — 97110 THERAPEUTIC EXERCISES: CPT | Performed by: PHYSICAL THERAPIST

## 2024-08-02 NOTE — FLOWSHEET NOTE
[facility]- Outpatient Rehabilitation and Therapy  [address], [place] [address] office: [phone] fax: [phone]         Physical Therapy: TREATMENT/PROGRESS NOTE   Patient: Elder Silva (48 y.o. female)   Examination Date: 2024   :  1975 MRN: [id number]   Visit #: 5  (10 from 7/10-24)  Insurance Allowable Auth Needed   Med nec [x]Yes    []No    Insurance: Payor: ALYX / Plan: ALYX HERNANDEZ [place] / Product Type: *No Product type* / Insurance:ALYX HERNANDEZ  Ded: 7250/ 570.08  OOP: 7250/ 570.08  Codes Billable: YES/ NOT 55777  Copay: 0  Coins: 0  TeleHealth: YES  Visit Limit: BMN  Auth Req: YES/ ANTONIETA   Ref #: AL G / [id number]  Insurance ID: [id number] - (Commercial)  Secondary Insurance (if applicable): [employer] O*   Treatment Diagnosis:     ICD-10-CM    1. Cervical muscle pain  M54.2       2. Neck stiffness  M43.6       3. Cervical radiculitis  M54.12          Medical Diagnosis:  Cervical spondylosis with radiculopathy [M47.22]  DDD (degenerative disc disease), lumbar [M51.36]  Scoliosis of thoracic spine, unspecified scoliosis type [M41.9]  Cervical spondylolysis [M43.02]  Neck pain [M54.2]   Referring Physician: John Amos MD  PCP: Debbie Morales, APRN - CNP     Plan of care signed (Y/N):     Date of Patient follow up with Physician:      Progress Report/POC: NO  POC update due: (10 visits /OR AUTH LIMITS, whichever is less)  2024                                             Precautions/ Contra-indications:           Latex allergy:  NO  Pacemaker:    NO  Contraindications for Manipulation:  none  Date of Surgery: n/a  Other: has moderate S shaped scoliosis (T spine convex curve to the right)    Red Flags:  None    C-SSRS Triggered by Intake questionnaire:   Patient answered 'NO' to both behavioral questions on intake.  No further screening warranted    Preferred Language for Healthcare:   [x] English       []

## 2024-08-05 ENCOUNTER — HOSPITAL ENCOUNTER (OUTPATIENT)
Dept: PHYSICAL THERAPY | Age: 49
Setting detail: THERAPIES SERIES
Discharge: HOME | End: 2024-08-05
Payer: COMMERCIAL

## 2024-08-05 PROCEDURE — 97140 MANUAL THERAPY 1/> REGIONS: CPT | Performed by: PHYSICAL THERAPIST

## 2024-08-05 PROCEDURE — G0283 ELEC STIM OTHER THAN WOUND: HCPCS | Performed by: PHYSICAL THERAPIST

## 2024-08-05 PROCEDURE — 97110 THERAPEUTIC EXERCISES: CPT | Performed by: PHYSICAL THERAPIST

## 2024-08-05 NOTE — FLOWSHEET NOTE
reporting muscular fatigue and increased soreness with program completed. Able to progress  intensity, resistance, exercise difficulty, and repetitions on all activity. Continues to display deficits in tightness, stiffness, and weakness which required ongoing skilled physical therapy and decision making.   She reported tightness and soreness around left side of C3-4 with passive left rotation.    Medical Necessity Documentation:  I certify that this patient meets the below criteria necessary for medical necessity for care and/or justification of therapy services:  The patient has functional impairments and/or activity limitations and would benefit from continued outpatient therapy services to address the deficits outlined in the patients goals  The patient has a musculoskeletal condition(s) with a corresponding ICD-10 code that is of complexity and severity that require skilled therapeutic intervention. This has a direct and significant impact on the need for therapy and significantly impacts the rate of recovery.   The patient has a complexity identified by an ICD-10 code that has a direct and significant impact on the need for therapy.  (Significantly impacts the rate of recovery and is associated with a primary condition.)     Return to Play: NA    Prognosis for POC: [x] Good [] Fair  [] Poor    Patient requires continued skilled intervention: [x] Yes  [] No      CHARGE CAPTURE     PT CHARGE GRID   CPT Code (TIMED) minutes # CPT Code (UNTIMED) #     Therex (32626)  20 2  EVAL:LOW (80298 - Typically 20 minutes face-to-face)     Neuromusc. Re-ed (06313)    Re-Eval (75175)     Manual (27014) 23 2  Estim Unattended (66358) 1    Ther. Act (84316)    Mech. Traction (52094)     Gait (82300)    Dry Needle 1-2 muscle (88693)     Aquatic Therex (15247)    Dry Needle 3+ muscle (20561)     Iontophoresis (26706)    VASO (82471)     Ultrasound (63347)    Group Therapy (67702)     Estim Attended (51435)    Luisa

## 2024-08-09 ENCOUNTER — HOSPITAL ENCOUNTER (OUTPATIENT)
Dept: PHYSICAL THERAPY | Age: 49
Setting detail: THERAPIES SERIES
Discharge: HOME | End: 2024-08-09
Payer: COMMERCIAL

## 2024-08-09 PROCEDURE — 97140 MANUAL THERAPY 1/> REGIONS: CPT | Performed by: PHYSICAL THERAPIST

## 2024-08-09 PROCEDURE — G0283 ELEC STIM OTHER THAN WOUND: HCPCS | Performed by: PHYSICAL THERAPIST

## 2024-08-09 PROCEDURE — 97012 MECHANICAL TRACTION THERAPY: CPT | Performed by: PHYSICAL THERAPIST

## 2024-08-09 NOTE — FLOWSHEET NOTE
bend to 25 deg without pain to allow for proper joint functioning to enable patient to have normal cervical motion .   [] Progressing: [] Met: [] Not Met: [] Adjusted  3. Patient will return to sitting at her computer for 30 min without increased symptoms or restriction.   [] Progressing: [] Met: [] Not Met: [] Adjusted  4. Patient will report decrease in radicular symptoms by 50% during sleep(patient specific functional goal)    [] Progressing: [x] Met: [] Not Met: [] Adjusted     Overall Progression Towards Functional goals/ Treatment Progress Update:  [x] Patient is progressing as expected towards functional goals listed.    [] Progression is slowed due to complexities/Impairments listed.  [] Progression has been slowed due to co-morbidities.  [] Plan just implemented, too soon (<30days) to assess goals progression   [] Goals require adjustment due to lack of progress  [] Patient is not progressing as expected and requires additional follow up with physician  [] Other:     TREATMENT PLAN     Frequency/Duration: 2x/week for 4-6 weeks for the following treatment interventions:    Interventions:  Therapeutic Exercise (62243) including: strength training, ROM, and functional mobility  Manual Therapy (23941) as indicated to include: Passive Range of Motion, Gr I-IV mobilizations, Soft Tissue Mobilization, Dry Needling/IASTM, and Trigger Point Release  Modalities as needed that may include: Thermal Agents  Patient education on postural re-education, activity modification, and progression of HEP    Plan: Cont POC- Continue emphasis/focus on exercise progression, modulating pain, increasing ROM, and improving postural awareness. Next visit plan to progress reps and add new exercises     Electronically Signed by Leanna Loyola PT  Date: 08/09/2024     Note: Portions of this note have been templated and/or copied from initial evaluation, reassessments and prior notes for documentation efficiency.    Note: If patient does not

## 2024-08-14 ENCOUNTER — HOSPITAL ENCOUNTER (OUTPATIENT)
Dept: PHYSICAL THERAPY | Age: 49
Setting detail: THERAPIES SERIES
Discharge: HOME | End: 2024-08-14
Payer: COMMERCIAL

## 2024-08-14 PROCEDURE — 97012 MECHANICAL TRACTION THERAPY: CPT | Performed by: PHYSICAL THERAPIST

## 2024-08-14 PROCEDURE — 97110 THERAPEUTIC EXERCISES: CPT | Performed by: PHYSICAL THERAPIST

## 2024-08-14 PROCEDURE — 97140 MANUAL THERAPY 1/> REGIONS: CPT | Performed by: PHYSICAL THERAPIST

## 2024-08-14 PROCEDURE — G0283 ELEC STIM OTHER THAN WOUND: HCPCS | Performed by: PHYSICAL THERAPIST

## 2024-08-14 NOTE — FLOWSHEET NOTE
[facility]- Outpatient Rehabilitation and Therapy  [address], [place] [address] office: [phone] fax: [phone]         Physical Therapy: TREATMENT/PROGRESS NOTE   Patient: Eledr Silva (48 y.o. female)   Examination Date: 2024   :  1975 MRN: [id number]   Visit #: 8  (10 from 7/10-24)  Insurance Allowable Auth Needed   Med nec [x]Yes    []No    Insurance: Payor: ALYX / Plan: ALYX HERNANDEZ [place] / Product Type: *No Product type* / Insurance:ALYX HERNANDEZ  Ded: 7250/ 570.08  OOP: 7250/ 570.08  Codes Billable: YES/ NOT 15061  Copay: 0  Coins: 0  TeleHealth: YES  Visit Limit: BMN  Auth Req: YES/ ANTONIETA   Ref #: AL G / [id number]  Insurance ID: [id number] - (Commercial)  Secondary Insurance (if applicable): [employer] O*   Treatment Diagnosis:     ICD-10-CM    1. Cervical muscle pain  M54.2       2. Neck stiffness  M43.6       3. Cervical radiculitis  M54.12          Medical Diagnosis:  Cervical spondylosis with radiculopathy [M47.22]  DDD (degenerative disc disease), lumbar [M51.36]  Scoliosis of thoracic spine, unspecified scoliosis type [M41.9]  Cervical spondylolysis [M43.02]  Neck pain [M54.2]   Referring Physician: John Amos MD  PCP: Debbie Morales, APRN - CNP     Plan of care signed (Y/N):     Date of Patient follow up with Physician:      Progress Report/POC: NO  POC update due: (10 visits /OR AUTH LIMITS, whichever is less)  2024                                             Precautions/ Contra-indications:           Latex allergy:  NO  Pacemaker:    NO  Contraindications for Manipulation:  none  Date of Surgery: n/a  Other: has moderate S shaped scoliosis (T spine convex curve to the right)    Red Flags:  None    C-SSRS Triggered by Intake questionnaire:   Patient answered 'NO' to both behavioral questions on intake.  No further screening warranted    Preferred Language for Healthcare:   [x] English       []

## 2024-08-19 ENCOUNTER — APPOINTMENT (OUTPATIENT)
Dept: PHYSICAL THERAPY | Age: 49
End: 2024-08-19
Payer: COMMERCIAL

## 2024-08-27 RX ORDER — PRAVASTATIN SODIUM 20 MG
TABLET ORAL
Qty: 90 TABLET | Refills: 3 | Status: SHIPPED | OUTPATIENT
Start: 2024-08-27

## 2024-09-05 ENCOUNTER — HOSPITAL ENCOUNTER (OUTPATIENT)
Dept: PHYSICAL THERAPY | Age: 49
Setting detail: THERAPIES SERIES
Discharge: HOME | End: 2024-09-05
Payer: MEDICAID

## 2024-09-05 PROCEDURE — 97140 MANUAL THERAPY 1/> REGIONS: CPT | Performed by: PHYSICAL THERAPIST

## 2024-09-05 PROCEDURE — G0283 ELEC STIM OTHER THAN WOUND: HCPCS | Performed by: PHYSICAL THERAPIST

## 2024-09-05 PROCEDURE — 97110 THERAPEUTIC EXERCISES: CPT | Performed by: PHYSICAL THERAPIST

## 2024-09-05 NOTE — PLAN OF CARE
modification, and progression of HEP    Plan: Cont POC- Continue emphasis/focus on exercise progression, modulating pain, increasing ROM, and improving postural awareness. Next visit plan to progress reps and add new exercises     Electronically Signed by Leanna Loyola PT  Date: 09/05/2024     Note: Portions of this note have been templated and/or copied from initial evaluation, reassessments and prior notes for documentation efficiency.    Note: If patient does not return for scheduled/recommended follow up visits, this note will serve as a discharge from care along with the most recent update on progress.    Ortho Evaluation

## 2024-09-10 ENCOUNTER — HOSPITAL ENCOUNTER (OUTPATIENT)
Dept: PHYSICAL THERAPY | Age: 49
Setting detail: THERAPIES SERIES
Discharge: HOME | End: 2024-09-10
Payer: MEDICAID

## 2024-09-10 PROCEDURE — 97112 NEUROMUSCULAR REEDUCATION: CPT | Performed by: PHYSICAL THERAPIST

## 2024-09-10 PROCEDURE — 97140 MANUAL THERAPY 1/> REGIONS: CPT | Performed by: PHYSICAL THERAPIST

## 2024-09-10 PROCEDURE — 97110 THERAPEUTIC EXERCISES: CPT | Performed by: PHYSICAL THERAPIST

## 2024-09-13 ENCOUNTER — APPOINTMENT (OUTPATIENT)
Dept: PHYSICAL THERAPY | Age: 49
End: 2024-09-13
Payer: MEDICAID

## 2024-09-13 ENCOUNTER — HOSPITAL ENCOUNTER (OUTPATIENT)
Dept: PHYSICAL THERAPY | Age: 49
Setting detail: THERAPIES SERIES
Discharge: HOME | End: 2024-09-13
Payer: MEDICAID

## 2024-09-13 PROCEDURE — 97140 MANUAL THERAPY 1/> REGIONS: CPT | Performed by: PHYSICAL THERAPIST

## 2024-09-13 PROCEDURE — 97110 THERAPEUTIC EXERCISES: CPT | Performed by: PHYSICAL THERAPIST

## 2024-09-13 PROCEDURE — G0283 ELEC STIM OTHER THAN WOUND: HCPCS | Performed by: PHYSICAL THERAPIST

## 2024-09-16 ENCOUNTER — HOSPITAL ENCOUNTER (OUTPATIENT)
Dept: PHYSICAL THERAPY | Age: 49
Setting detail: THERAPIES SERIES
Discharge: HOME | End: 2024-09-16
Payer: MEDICAID

## 2024-09-16 PROCEDURE — 97112 NEUROMUSCULAR REEDUCATION: CPT | Performed by: PHYSICAL THERAPIST

## 2024-09-16 PROCEDURE — G0283 ELEC STIM OTHER THAN WOUND: HCPCS | Performed by: PHYSICAL THERAPIST

## 2024-09-16 PROCEDURE — 97140 MANUAL THERAPY 1/> REGIONS: CPT | Performed by: PHYSICAL THERAPIST

## 2024-09-19 ENCOUNTER — APPOINTMENT (OUTPATIENT)
Dept: PHYSICAL THERAPY | Age: 49
End: 2024-09-19
Payer: MEDICAID

## 2024-09-23 ENCOUNTER — OFFICE VISIT (OUTPATIENT)
Dept: OBGYN CLINIC | Age: 49
End: 2024-09-23
Payer: MEDICAID

## 2024-09-23 VITALS
DIASTOLIC BLOOD PRESSURE: 70 MMHG | HEART RATE: 89 BPM | TEMPERATURE: 98 F | SYSTOLIC BLOOD PRESSURE: 107 MMHG | WEIGHT: 142 LBS | HEIGHT: 66 IN | BODY MASS INDEX: 22.82 KG/M2

## 2024-09-23 DIAGNOSIS — Z86.018 HISTORY OF UTERINE FIBROID: ICD-10-CM

## 2024-09-23 DIAGNOSIS — N93.9 ABNORMAL UTERINE BLEEDING: Primary | ICD-10-CM

## 2024-09-23 PROCEDURE — G8427 DOCREV CUR MEDS BY ELIG CLIN: HCPCS | Performed by: OBSTETRICS & GYNECOLOGY

## 2024-09-23 PROCEDURE — 99213 OFFICE O/P EST LOW 20 MIN: CPT | Performed by: OBSTETRICS & GYNECOLOGY

## 2024-09-23 PROCEDURE — 1036F TOBACCO NON-USER: CPT | Performed by: OBSTETRICS & GYNECOLOGY

## 2024-09-23 PROCEDURE — G8420 CALC BMI NORM PARAMETERS: HCPCS | Performed by: OBSTETRICS & GYNECOLOGY

## 2024-09-23 PROCEDURE — 36415 COLL VENOUS BLD VENIPUNCTURE: CPT | Performed by: OBSTETRICS & GYNECOLOGY

## 2024-09-23 NOTE — FLOWSHEET NOTE
[facility],  [address], [address] [place] [address]        Physical Therapy  Cancellation/No-show Note  Patient Name:  Haseeb Mcconnell  :  1975   Date:  3/3/2023  Cancelled visits to date: 0  No-shows to date: 0    For today's appointment patient:  [x]  Cancelled  []  Rescheduled appointment  []  No-show     Reason given by patient:  [x]  Patient ill  []  Conflicting appointment  []  No transportation    []  Conflict with work  []  No reason given  [x]  Other: GOING TO MD    Comments:      Electronically signed by:  Mitchell Pop, PT, [address], [address] none

## 2024-09-24 ENCOUNTER — HOSPITAL ENCOUNTER (OUTPATIENT)
Dept: PHYSICAL THERAPY | Age: 49
Setting detail: THERAPIES SERIES
End: 2024-09-24
Payer: MEDICAID

## 2024-09-24 LAB
T4 FREE SERPL-MCNC: 1.6 NG/DL (ref 0.9–1.8)
TSH SERPL DL<=0.005 MIU/L-ACNC: 4.27 UIU/ML (ref 0.27–4.2)

## 2024-09-26 ENCOUNTER — PATIENT MESSAGE (OUTPATIENT)
Dept: FAMILY MEDICINE CLINIC | Age: 49
End: 2024-09-26

## 2024-09-27 ENCOUNTER — HOSPITAL ENCOUNTER (OUTPATIENT)
Dept: PHYSICAL THERAPY | Age: 49
Setting detail: THERAPIES SERIES
End: 2024-09-27
Payer: MEDICAID

## 2024-10-03 ENCOUNTER — OFFICE VISIT (OUTPATIENT)
Dept: FAMILY MEDICINE CLINIC | Age: 49
End: 2024-10-03
Payer: MEDICAID

## 2024-10-03 VITALS
SYSTOLIC BLOOD PRESSURE: 102 MMHG | WEIGHT: 140 LBS | OXYGEN SATURATION: 98 % | BODY MASS INDEX: 22.6 KG/M2 | HEART RATE: 81 BPM | DIASTOLIC BLOOD PRESSURE: 70 MMHG

## 2024-10-03 DIAGNOSIS — E03.9 HYPOTHYROIDISM, UNSPECIFIED TYPE: ICD-10-CM

## 2024-10-03 DIAGNOSIS — Z00.00 PHYSICAL EXAM: Primary | ICD-10-CM

## 2024-10-03 PROCEDURE — 90715 TDAP VACCINE 7 YRS/> IM: CPT | Performed by: NURSE PRACTITIONER

## 2024-10-03 PROCEDURE — 90471 IMMUNIZATION ADMIN: CPT | Performed by: NURSE PRACTITIONER

## 2024-10-03 PROCEDURE — 99396 PREV VISIT EST AGE 40-64: CPT | Performed by: NURSE PRACTITIONER

## 2024-10-03 PROCEDURE — G8484 FLU IMMUNIZE NO ADMIN: HCPCS | Performed by: NURSE PRACTITIONER

## 2024-10-03 SDOH — ECONOMIC STABILITY: FOOD INSECURITY: WITHIN THE PAST 12 MONTHS, YOU WORRIED THAT YOUR FOOD WOULD RUN OUT BEFORE YOU GOT MONEY TO BUY MORE.: NEVER TRUE

## 2024-10-03 SDOH — ECONOMIC STABILITY: INCOME INSECURITY: HOW HARD IS IT FOR YOU TO PAY FOR THE VERY BASICS LIKE FOOD, HOUSING, MEDICAL CARE, AND HEATING?: NOT VERY HARD

## 2024-10-03 SDOH — ECONOMIC STABILITY: FOOD INSECURITY: WITHIN THE PAST 12 MONTHS, THE FOOD YOU BOUGHT JUST DIDN'T LAST AND YOU DIDN'T HAVE MONEY TO GET MORE.: NEVER TRUE

## 2024-10-03 ASSESSMENT — ENCOUNTER SYMPTOMS
SORE THROAT: 0
WHEEZING: 0
CONSTIPATION: 1
SHORTNESS OF BREATH: 0

## 2024-10-03 NOTE — PROGRESS NOTES
[facility]    Stress     Stress: 0   Social Connections: Not on File (9/5/2024)    Received from [facility]    Social Connections     Connectedness: 0    Received from [facility], [facility]    Interpersonal Safety   Housing Stability: Unknown (10/3/2024)    Housing Stability Vital Sign     Homeless in the Last Year: No       Allergies   Allergen Reactions    Amoxicillin-Pot Clavulanate Other (See Comments)     Other reaction(s): Other (See Comments)  Fungus in her vagina  Fungus in her vagina      Iodine      Other reaction(s): Other (See Comments)  Per pt, big spots appears       Current Outpatient Medications   Medication Sig Dispense Refill    pravastatin (PRAVACHOL) 20 MG tablet TAKE 1 TABLET BY MOUTH DAILY STOP LIPITOR 90 tablet 3    levothyroxine (SYNTHROID) 88 MCG tablet TAKE 1 TABLET BY MOUTH DAILY 90 tablet 3    fluticasone (FLONASE) 50 MCG/ACT nasal spray 2 sprays by Nasal route daily 16 g 3     No current facility-administered medications for this visit.       The patient's past medical history, past surgical history, family history, medications, and allergies were all reviewed and updated at appropriate today.      Review of Systems   Constitutional:  Negative for chills and fever.   HENT:  Negative for congestion and sore throat.    Respiratory:  Negative for shortness of breath and wheezing.    Cardiovascular:  Negative for chest pain and palpitations.   Gastrointestinal:  Positive for constipation.   Genitourinary: Negative.    Musculoskeletal:  Positive for arthralgias, myalgias and neck pain.   Skin:         Itchy skin on face   Neurological:  Positive for numbness (numbness and tingling in bilateral hands at night- doing PT for neck).   Psychiatric/Behavioral: Negative.           Physical Exam  Vitals and nursing note reviewed.   Constitutional:       Appearance: Normal appearance. She is well-developed.   HENT:      Head: Normocephalic and

## 2024-10-08 ENCOUNTER — OFFICE VISIT (OUTPATIENT)
Dept: OBGYN CLINIC | Age: 49
End: 2024-10-08
Payer: MEDICAID

## 2024-10-08 VITALS
OXYGEN SATURATION: 98 % | HEIGHT: 66 IN | SYSTOLIC BLOOD PRESSURE: 121 MMHG | BODY MASS INDEX: 22.63 KG/M2 | TEMPERATURE: 97.5 F | DIASTOLIC BLOOD PRESSURE: 78 MMHG | WEIGHT: 140.8 LBS | HEART RATE: 83 BPM

## 2024-10-08 DIAGNOSIS — N93.9 ABNORMAL UTERINE BLEEDING: Primary | ICD-10-CM

## 2024-10-08 PROCEDURE — G8427 DOCREV CUR MEDS BY ELIG CLIN: HCPCS | Performed by: OBSTETRICS & GYNECOLOGY

## 2024-10-08 PROCEDURE — G8484 FLU IMMUNIZE NO ADMIN: HCPCS | Performed by: OBSTETRICS & GYNECOLOGY

## 2024-10-08 PROCEDURE — 1036F TOBACCO NON-USER: CPT | Performed by: OBSTETRICS & GYNECOLOGY

## 2024-10-08 PROCEDURE — G8420 CALC BMI NORM PARAMETERS: HCPCS | Performed by: OBSTETRICS & GYNECOLOGY

## 2024-10-08 PROCEDURE — 99213 OFFICE O/P EST LOW 20 MIN: CPT | Performed by: OBSTETRICS & GYNECOLOGY

## 2024-10-08 NOTE — PROGRESS NOTES
[facility]     CC:   Chief Complaint   Patient presents with    Follow-up     Follow up with US         HPI:  48 y.o.  presents with:   48 year old female  LMP 2024 presents for follow up on AUB. Patient reports that since her last visit she has not had a cycle. Prior to last visit she was having cycles every two to four weeks.        Medications:  Current Outpatient Medications   Medication Sig Dispense Refill    pravastatin (PRAVACHOL) 20 MG tablet TAKE 1 TABLET BY MOUTH DAILY STOP LIPITOR 90 tablet 3    levothyroxine (SYNTHROID) 88 MCG tablet TAKE 1 TABLET BY MOUTH DAILY 90 tablet 3    fluticasone (FLONASE) 50 MCG/ACT nasal spray 2 sprays by Nasal route daily 16 g 3     No current facility-administered medications for this visit.       Allergies:  Allergies   Allergen Reactions    Amoxicillin-Pot Clavulanate Other (See Comments)     Other reaction(s): Other (See Comments)  Fungus in her vagina  Fungus in her vagina      Iodine      Other reaction(s): Other (See Comments)  Per pt, big spots appears       ROS:  Review of Systems   Genitourinary:  Positive for menstrual problem.        Physical Exam:  Physical Exam  Vitals and nursing note reviewed.   Constitutional:       Appearance: Normal appearance.   HENT:      Head: Normocephalic and atraumatic.   Eyes:      General:         Right eye: No discharge.         Left eye: No discharge.      Conjunctiva/sclera: Conjunctivae normal.   Cardiovascular:      Rate and Rhythm: Normal rate.   Pulmonary:      Effort: Pulmonary effort is normal.   Musculoskeletal:         General: Normal range of motion.      Cervical back: Normal range of motion and neck supple.   Skin:     General: Skin is warm and dry.   Neurological:      Mental Status: She is alert.   Psychiatric:         Mood and Affect: Mood normal.         Behavior: Behavior normal.          PELVIC ULTRASOUND without DOPPLER INTERROGATION   NON OB     DATE: 10/08/2024     PHYSICIAN: NIR

## 2024-10-15 ENCOUNTER — HOSPITAL ENCOUNTER (OUTPATIENT)
Dept: PHYSICAL THERAPY | Age: 49
Setting detail: THERAPIES SERIES
Discharge: HOME | End: 2024-10-15
Payer: MEDICAID

## 2024-10-15 PROCEDURE — 97140 MANUAL THERAPY 1/> REGIONS: CPT | Performed by: PHYSICAL THERAPIST

## 2024-10-15 PROCEDURE — 97112 NEUROMUSCULAR REEDUCATION: CPT | Performed by: PHYSICAL THERAPIST

## 2024-10-15 PROCEDURE — G0283 ELEC STIM OTHER THAN WOUND: HCPCS | Performed by: PHYSICAL THERAPIST

## 2024-10-15 NOTE — PLAN OF CARE
(48479) including: strength training, ROM, and functional mobility  Manual Therapy (38254) as indicated to include: Passive Range of Motion, Gr I-IV mobilizations, Soft Tissue Mobilization, Dry Needling/IASTM, and Trigger Point Release  Modalities as needed that may include: Thermal Agents  Patient education on postural re-education, activity modification, and progression of HEP    Plan: Cont POC- Continue emphasis/focus on exercise progression, modulating pain, increasing ROM, and improving postural awareness. Next visit plan to progress reps and add new exercises     Electronically Signed by Ashley Turner PT, MSPT, OMT-C [id number]    Date: 10/15/2024     Note: Portions of this note have been templated and/or copied from initial evaluation, reassessments and prior notes for documentation efficiency.    Note: If patient does not return for scheduled/recommended follow up visits, this note will serve as a discharge from care along with the most recent update on progress.    Ortho Evaluation

## 2024-10-22 ENCOUNTER — HOSPITAL ENCOUNTER (OUTPATIENT)
Dept: PHYSICAL THERAPY | Age: 49
Setting detail: THERAPIES SERIES
Discharge: HOME | End: 2024-10-22
Payer: MEDICAID

## 2024-10-22 PROCEDURE — 97112 NEUROMUSCULAR REEDUCATION: CPT | Performed by: PHYSICAL THERAPIST

## 2024-10-22 PROCEDURE — G0283 ELEC STIM OTHER THAN WOUND: HCPCS | Performed by: PHYSICAL THERAPIST

## 2024-10-22 PROCEDURE — 97140 MANUAL THERAPY 1/> REGIONS: CPT | Performed by: PHYSICAL THERAPIST

## 2024-10-22 NOTE — FLOWSHEET NOTE
services:  The patient has functional impairments and/or activity limitations and would benefit from continued outpatient therapy services to address the deficits outlined in the patients goals  The patient has a musculoskeletal condition(s) with a corresponding ICD-10 code that is of complexity and severity that require skilled therapeutic intervention. This has a direct and significant impact on the need for therapy and significantly impacts the rate of recovery.   The patient has a complexity identified by an ICD-10 code that has a direct and significant impact on the need for therapy.  (Significantly impacts the rate of recovery and is associated with a primary condition.)     Return to Play: NA    Prognosis for POC: [x] Good [] Fair  [] Poor    Patient requires continued skilled intervention: [x] Yes  [] No      CHARGE CAPTURE     PT CHARGE GRID   CPT Code (TIMED) minutes # CPT Code (UNTIMED) #     Therex (36441)     EVAL:LOW (71784 - Typically 20 minutes face-to-face)     Neuromusc. Re-ed (04518) 9 1  Re-Eval (07109)     Manual (55999) 34 2  Estim Unattended (52852) 1    Ther. Act (79973)    Mech. Traction (88741)     Gait (12004)    Dry Needle 1-2 muscle (18191)     Aquatic Therex (14854)    Dry Needle 3+ muscle (20561)     Iontophoresis (91328)    VASO (42593)     Ultrasound (04417)    Group Therapy (74435)     Estim Attended (63676)    Canalith Repositioning (86276)     Other:    Other:    Total Timed Code Tx Minutes 43 3  1     Total Treatment Minutes 58        Charge Justification:  (53455) THERAPEUTIC EXERCISE - Provided verbal/tactile cueing for activities related to strengthening, flexibility, endurance, ROM performed to prevent loss of range of motion, maintain or improve muscular strength or increase flexibility, following either an injury or surgery.   (24793) MANUAL THERAPY -  Manual therapy techniques, 1 or more regions, each 15 minutes (Mobilization/manipulation, manual lymphatic drainage, manual

## 2024-10-22 NOTE — PLAN OF CARE
therapy and significantly impacts the rate of recovery.   The patient has a complexity identified by an ICD-10 code that has a direct and significant impact on the need for therapy.  (Significantly impacts the rate of recovery and is associated with a primary condition.)     Return to Play: NA    Prognosis for POC: [x] Good [] Fair  [] Poor    Patient requires continued skilled intervention: [x] Yes  [] No      CHARGE CAPTURE     PT CHARGE GRID   CPT Code (TIMED) minutes # CPT Code (UNTIMED) #     Therex (21055)     EVAL:LOW (28571 - Typically 20 minutes face-to-face)     Neuromusc. Re-ed (08347) 9 1  Re-Eval (66274)     Manual (48499) 34 2  Estim Unattended (38305) 1    Ther. Act (03376)    Mech. Traction (02213)     Gait (83463)    Dry Needle 1-2 muscle (20560)     Aquatic Therex (98338)    Dry Needle 3+ muscle (20561)     Iontophoresis (54726)    VASO (04292)     Ultrasound (61184)    Group Therapy (96968)     Estim Attended (99512)    Canalith Repositioning (59867)     Other:    Other:    Total Timed Code Tx Minutes 43 3  1     Total Treatment Minutes 58        Charge Justification:  (06000) THERAPEUTIC EXERCISE - Provided verbal/tactile cueing for activities related to strengthening, flexibility, endurance, ROM performed to prevent loss of range of motion, maintain or improve muscular strength or increase flexibility, following either an injury or surgery.   (70995) MANUAL THERAPY -  Manual therapy techniques, 1 or more regions, each 15 minutes (Mobilization/manipulation, manual lymphatic drainage, manual traction) for the purpose of modulating pain, promoting relaxation,  increasing ROM, reducing/eliminating soft tissue swelling/inflammation/restriction, improving soft tissue extensibility and allowing for proper ROM for normal function with self care, mobility, lifting and ambulation  (20472) UNATTENDED ESTIM. Electrical stimulation (unattended), to 1 or more areas for indication(s) other than wound care, as

## 2024-10-29 ENCOUNTER — HOSPITAL ENCOUNTER (OUTPATIENT)
Dept: PHYSICAL THERAPY | Age: 49
Setting detail: THERAPIES SERIES
Discharge: HOME | End: 2024-10-29
Payer: MEDICAID

## 2024-10-29 PROCEDURE — 97140 MANUAL THERAPY 1/> REGIONS: CPT | Performed by: PHYSICAL THERAPIST

## 2024-10-29 PROCEDURE — 97110 THERAPEUTIC EXERCISES: CPT | Performed by: PHYSICAL THERAPIST

## 2024-10-29 PROCEDURE — G0283 ELEC STIM OTHER THAN WOUND: HCPCS | Performed by: PHYSICAL THERAPIST

## 2024-10-29 NOTE — FLOWSHEET NOTE
Patient will have a decrease in pain to <5/10/10 to facilitate improvement in movement, function, and ADLs as indicated by Functional Deficits.  [] Progressing: [x] Met: [] Not Met: [] Adjusted    Long Term Goals: To be achieved in: 6 weeks  1. Disability index score of 26% or less for the Neck Disability Index to assist with reaching prior level of function with activities such as being able to sit at computer for work.  [x] Progressing: [] Met: [] Not Met: [] Adjusted  2. Patient will demonstrate increased AROM of left side bend to 25 deg without pain to allow for proper joint functioning to enable patient to have normal cervical motion .   [x] Progressing:with pain [] Met: [] Not Met: [] Adjusted  3. Patient will return to sitting at her computer for 30 min without increased symptoms or restriction.   [x] Progressing:still painful at night after sitting during day [] Met: [] Not Met: [] Adjusted  4. Patient will report decrease in radicular symptoms by 50% during sleep(patient specific functional goal)    [x] Progressing:recent set back after having guests [] Met: [] Not Met: [] Adjusted     Overall Progression Towards Functional goals/ Treatment Progress Update:  [] Patient is progressing as expected towards functional goals listed.    [x] Progression is slowed due to complexities/Impairments listed.  [] Progression has been slowed due to co-morbidities.  [] Plan just implemented, too soon (<30days) to assess goals progression   [] Goals require adjustment due to lack of progress  [] Patient is not progressing as expected and requires additional follow up with physician  [] Other:     TREATMENT PLAN     Frequency/Duration: 1-2x/week for 8 weeks for the following treatment interventions:    Interventions:  Therapeutic Exercise (64595) including: strength training, ROM, and functional mobility  Manual Therapy (76853) as indicated to include: Passive Range of Motion, Gr I-IV mobilizations, Soft Tissue Mobilization,

## 2024-11-19 ENCOUNTER — HOSPITAL ENCOUNTER (OUTPATIENT)
Dept: PHYSICAL THERAPY | Age: 49
Setting detail: THERAPIES SERIES
Discharge: HOME | End: 2024-11-19
Payer: MEDICAID

## 2024-11-19 DIAGNOSIS — E03.9 HYPOTHYROIDISM, UNSPECIFIED TYPE: ICD-10-CM

## 2024-11-19 LAB
T4 FREE SERPL-MCNC: 1.4 NG/DL (ref 0.9–1.8)
TSH SERPL DL<=0.005 MIU/L-ACNC: 4.17 UIU/ML (ref 0.27–4.2)

## 2024-11-19 PROCEDURE — 97140 MANUAL THERAPY 1/> REGIONS: CPT | Performed by: PHYSICAL THERAPIST

## 2024-11-19 PROCEDURE — 97110 THERAPEUTIC EXERCISES: CPT | Performed by: PHYSICAL THERAPIST

## 2024-11-19 NOTE — FLOWSHEET NOTE
Progressing: [] Met: [] Not Met: [] Adjusted    Therapist goals for Patient:   Short Term Goals: To be achieved in: 2 weeks  1. Independent in HEP and progression per patient tolerance, in order to prevent re-injury.   [] Progressing: [x] Met: [] Not Met: [] Adjusted  2. Patient will have a decrease in pain to <5/10/10 to facilitate improvement in movement, function, and ADLs as indicated by Functional Deficits.  [] Progressing: [x] Met: [] Not Met: [] Adjusted    Long Term Goals: To be achieved in: 6 weeks  1. Disability index score of 26% or less for the Neck Disability Index to assist with reaching prior level of function with activities such as being able to sit at computer for work.  [x] Progressing: [] Met: [] Not Met: [] Adjusted  2. Patient will demonstrate increased AROM of left side bend to 25 deg without pain to allow for proper joint functioning to enable patient to have normal cervical motion .   [x] Progressing:with pain [] Met: [] Not Met: [] Adjusted  3. Patient will return to sitting at her computer for 30 min without increased symptoms or restriction.   [x] Progressing:still painful at night after sitting during day [] Met: [] Not Met: [] Adjusted  4. Patient will report decrease in radicular symptoms by 50% during sleep(patient specific functional goal)    [x] Progressing:recent set back after having guests [] Met: [] Not Met: [] Adjusted     Overall Progression Towards Functional goals/ Treatment Progress Update:  [] Patient is progressing as expected towards functional goals listed.    [x] Progression is slowed due to complexities/Impairments listed.  [] Progression has been slowed due to co-morbidities.  [] Plan just implemented, too soon (<30days) to assess goals progression   [] Goals require adjustment due to lack of progress  [] Patient is not progressing as expected and requires additional follow up with physician  [] Other:     TREATMENT PLAN     Frequency/Duration: 1-2x/week for 8 weeks

## 2024-11-26 ENCOUNTER — HOSPITAL ENCOUNTER (OUTPATIENT)
Dept: PHYSICAL THERAPY | Age: 49
Setting detail: THERAPIES SERIES
Discharge: HOME | End: 2024-11-26
Payer: MEDICAID

## 2024-11-26 PROCEDURE — 97110 THERAPEUTIC EXERCISES: CPT | Performed by: PHYSICAL THERAPIST

## 2024-11-26 PROCEDURE — 97140 MANUAL THERAPY 1/> REGIONS: CPT | Performed by: PHYSICAL THERAPIST

## 2024-11-26 PROCEDURE — 97112 NEUROMUSCULAR REEDUCATION: CPT | Performed by: PHYSICAL THERAPIST

## 2024-11-26 NOTE — FLOWSHEET NOTE
11/26/2024     Note: Portions of this note have been templated and/or copied from initial evaluation, reassessments and prior notes for documentation efficiency.    Note: If patient does not return for scheduled/recommended follow up visits, this note will serve as a discharge from care along with the most recent update on progress.    Ortho Evaluation

## 2024-12-03 ENCOUNTER — HOSPITAL ENCOUNTER (OUTPATIENT)
Dept: PHYSICAL THERAPY | Age: 49
Setting detail: THERAPIES SERIES
Discharge: HOME | End: 2024-12-03
Payer: MEDICAID

## 2024-12-03 PROCEDURE — 97140 MANUAL THERAPY 1/> REGIONS: CPT | Performed by: PHYSICAL THERAPIST

## 2024-12-03 PROCEDURE — 97110 THERAPEUTIC EXERCISES: CPT | Performed by: PHYSICAL THERAPIST

## 2024-12-03 NOTE — FLOWSHEET NOTE
include: Passive Range of Motion, Gr I-IV mobilizations, Soft Tissue Mobilization, Dry Needling/IASTM, and Trigger Point Release  Modalities as needed that may include: Thermal Agents  Patient education on postural re-education, activity modification, and progression of HEP    Plan: Cont POC- Continue emphasis/focus on exercise progression, modulating pain, increasing ROM, and improving postural awareness. Next visit plan to progress reps     Electronically Signed by Ashley Turner PT, MSPT, OMT-C [id number]    Date: 12/03/2024     Note: Portions of this note have been templated and/or copied from initial evaluation, reassessments and prior notes for documentation efficiency.    Note: If patient does not return for scheduled/recommended follow up visits, this note will serve as a discharge from care along with the most recent update on progress.    Ortho Evaluation

## 2024-12-10 ENCOUNTER — APPOINTMENT (OUTPATIENT)
Dept: PHYSICAL THERAPY | Age: 49
End: 2024-12-10
Payer: MEDICAID

## 2024-12-16 ENCOUNTER — APPOINTMENT (OUTPATIENT)
Dept: PHYSICAL THERAPY | Age: 49
End: 2024-12-16
Payer: MEDICAID

## 2025-01-28 ENCOUNTER — HOSPITAL ENCOUNTER (OUTPATIENT)
Dept: PHYSICAL THERAPY | Age: 50
Setting detail: THERAPIES SERIES
Discharge: HOME | End: 2025-01-28
Payer: MEDICAID

## 2025-01-28 PROCEDURE — 97140 MANUAL THERAPY 1/> REGIONS: CPT | Performed by: PHYSICAL THERAPIST

## 2025-01-28 PROCEDURE — 97112 NEUROMUSCULAR REEDUCATION: CPT | Performed by: PHYSICAL THERAPIST

## 2025-01-28 NOTE — FLOWSHEET NOTE
[facility]- Outpatient Rehabilitation and Therapy  [address], [place] [address] office: [phone] fax: [phone]  Physical Therapy Re-Certification Plan of Care    Dear John Amos MD  ,    We had the pleasure of treating the following patient for physical therapy services at [facility]. A summary of our findings can be found in the updated assessment below.  This includes our plan of care.  If you have any questions or concerns regarding these findings, please do not hesitate to contact me at the office phone number checked above.  Thank you for the referral.     Physician Signature:________________________________Date:__________________  By signing above (or electronic signature), therapist's plan is approved by physician      Total Visits: 19     Overall Response to Treatment:  Patient unable to adhere to initial POC and pt. Has not been to clinic since early December due to schedule challenges and difficulty over the Holidays    Recommendation:    [x] Continue PT 1-2x / wk for 6 weeks.   [] Hold PT, pending MD visit   [] Discharge to [facility]. Follow up with PT or MD PRN.    Physical Therapy: TREATMENT/PROGRESS NOTE   Patient: Elder Silva (49 y.o. female)   Examination Date: 2025   :  1975 MRN: [id number]   Visit #: 19  (10 from 7/10-24)  Insurance Allowable Auth Needed   12v, 6 v,   10/15/24 [place] 30v [x]Yes    []No  Auth after 30v    Insurance: Payor: [employer] [place] MEDICAID / Plan: [employer] [place] MEDICA / Product Type: *No Product type* / Insurance:ALYX HERNANDEZ  Ded: 7250/ 570.08  OOP: 7250/ 570.08  Codes Billable: YES/ NOT 37062  Copay: 0  Coins: 0  TeleHealth: YES  Visit Limit: BMN  Auth Req: YES/ ANTONIETA   Ref #: AL G / [id number]  Insurance ID: [id number] - (Medicaid Managed)  Secondary Insurance (if applicable):    Treatment Diagnosis:     ICD-10-CM    1. Cervical muscle pain  M54.2       2. Neck

## 2025-02-04 ENCOUNTER — HOSPITAL ENCOUNTER (OUTPATIENT)
Dept: PHYSICAL THERAPY | Age: 50
Setting detail: THERAPIES SERIES
Discharge: HOME | End: 2025-02-04
Payer: MEDICAID

## 2025-02-04 PROCEDURE — 97161 PT EVAL LOW COMPLEX 20 MIN: CPT | Performed by: PHYSICAL THERAPIST

## 2025-02-04 PROCEDURE — 97140 MANUAL THERAPY 1/> REGIONS: CPT | Performed by: PHYSICAL THERAPIST

## 2025-02-04 PROCEDURE — 97110 THERAPEUTIC EXERCISES: CPT | Performed by: PHYSICAL THERAPIST

## 2025-02-04 NOTE — PLAN OF CARE
tolerate sitting longer  [] Progressing: [] Met: [] Not Met: [] Adjusted    Therapist goals for Patient:   Short Term Goals: To be achieved in: 2 weeks  1Independent in HEP and progression per patient tolerance, in order to prevent re-injury.   [] Progressing: [] Met: [] Not Met: [] Adjusted  Patient will have a decrease in pain to <2/10 to facilitate improvement in movement, function, and ADLs as indicated by Functional Deficits.  [] Progressing: [] Met: [] Not Met: [] Adjusted    IF APPLICABLE:  [] Patient to demonstrate independence in wear and care for custom orthotic device. (Only if applicable for orthotic eval)     Long Term Goals: To be achieved in: 8 weeks  Disability index score of 18% or less for the Modified Oswestry to assist with reaching prior level of function with activities such as position changes.  [] Progressing: [] Met: [] Not Met: [] Adjusted  Patient will demonstrate increased AROM of R hip and lumbar spine to WNL without pain to allow for proper joint functioning to enable patient to bend forward and get dressed in the morning.   [] Progressing: [] Met: [] Not Met: [] Adjusted  Patient will demonstrate increased Strength of B hips to at least 4+/5 throughout without pain to allow for proper functional mobility to enable patient to return to go up and down steps without issue.   [] Progressing: [] Met: [] Not Met: [] Adjusted  Patient will return to sitting for 2 hour time frame for work without increased symptoms or restriction.   [] Progressing: [] Met: [] Not Met: [] Adjusted  Pt. To be able to sleep 6 hours or better without waking due to pain in LB(patient specific functional goal)    [] Progressing: [] Met: [] Not Met: [] Adjusted     Overall Progression Towards Functional goals/ Treatment Progress Update:  [] Patient is progressing as expected towards functional goals listed.    [] Progression is slowed due to complexities/Impairments listed.  [] Progression has been slowed due to

## 2025-02-06 ENCOUNTER — HOSPITAL ENCOUNTER (OUTPATIENT)
Dept: PHYSICAL THERAPY | Age: 50
Setting detail: THERAPIES SERIES
Discharge: HOME | End: 2025-02-06
Payer: MEDICAID

## 2025-02-06 PROCEDURE — 97140 MANUAL THERAPY 1/> REGIONS: CPT | Performed by: PHYSICAL THERAPIST

## 2025-02-06 PROCEDURE — 97110 THERAPEUTIC EXERCISES: CPT | Performed by: PHYSICAL THERAPIST

## 2025-02-06 NOTE — FLOWSHEET NOTE
[facility]- Outpatient Rehabilitation and Therapy  [address], [place] [address] office: [phone] fax: [phone]    Physical Therapy: TREATMENT/PROGRESS NOTE   Patient: Elder Silva (49 y.o. female)   Examination Date: 2025   :  1975 MRN: [id number]   Visit #: 20  (10 from 7/10-24)  Insurance Allowable Auth Needed   12v, 6 v,   10/15/24 [place] 30v [x]Yes    []No  Auth after 30v    Insurance: Payor: [employer] [place] MEDICA[employer] / Plan: [employer] [place] MEDICA / Product Type: *No Product type* / Insurance:ALYX HERNANDEZ  Ded: 7250/ 570.08  OOP: 7250/ 570.08  Codes Billable: YES/ NOT 75950  Copay: 0  Coins: 0  TeleHealth: YES  Visit Limit: BMN  Auth Req: YES/ ANTONIETA   Ref #: AL G / [id number]  Insurance ID: [id number] - (Medicaid Managed)  Secondary Insurance (if applicable):    Treatment Diagnosis:     ICD-10-CM    1. Cervical muscle pain  M54.2       2. Neck stiffness  M43.6       3. Cervical radiculitis  M54.12          Medical Diagnosis:  Cervical spondylosis with radiculopathy [M47.22]  DDD (degenerative disc disease), lumbar [M51.369]  Scoliosis of thoracic spine, unspecified scoliosis type [M41.9]  Cervical spondylolysis [M43.02]  Neck pain [M54.2]   Referring Physician: John Amos MD  PCP: Debbie Morales, APRN - CNP     Plan of care signed (Y/N):     Date of Patient follow up with Physician:      Progress Report/POC: YES, Date Range for this report: 24 to 25  POC update due: (10 visits /OR AUTH LIMITS, whichever is less)  2024                                             Precautions/ Contra-indications:           Latex allergy:  NO  Pacemaker:    NO  Contraindications for Manipulation:  none  Date of Surgery: n/a  Other: has moderate S shaped scoliosis (T spine convex curve to the right)    Red Flags:  None    C-SSRS Triggered by Intake questionnaire:   Patient answered 'NO' to both behavioral questions on intake.  No

## 2025-02-11 ENCOUNTER — HOSPITAL ENCOUNTER (OUTPATIENT)
Dept: PHYSICAL THERAPY | Age: 50
Setting detail: THERAPIES SERIES
Discharge: HOME | End: 2025-02-11
Payer: MEDICAID

## 2025-02-11 PROCEDURE — 97140 MANUAL THERAPY 1/> REGIONS: CPT | Performed by: PHYSICAL THERAPIST

## 2025-02-11 PROCEDURE — 97112 NEUROMUSCULAR REEDUCATION: CPT | Performed by: PHYSICAL THERAPIST

## 2025-02-11 PROCEDURE — 97110 THERAPEUTIC EXERCISES: CPT | Performed by: PHYSICAL THERAPIST

## 2025-02-11 NOTE — FLOWSHEET NOTE
[facility] - Outpatient Rehabilitation and Therapy: [address], [place] [address] office: [phone] fax: [phone]         Physical Therapy: TREATMENT/PROGRESS NOTE   Patient: Elder Silva (49 y.o. female)   Examination Date: 2025   :  1975 MRN: [id number]   Visit #: 21   Insurance Allowable Auth Needed   30 []Yes    [x]No    Insurance: Payor: [employer] [place] MEDICA[employer] / Plan: [employer] [place] MEDICA / Product Type: *No Product type* /   Insurance ID: [id number] - (Medicaid Managed)  Secondary Insurance (if applicable):    Treatment Diagnosis: Lumbar pain M54.5      Medical Diagnosis:  Cervical spondylosis with radiculopathy [M47.22]  DDD (degenerative disc disease), lumbar [M51.369]  Scoliosis of thoracic spine, unspecified scoliosis type [M41.9]  Cervical spondylolysis [M43.02]  Neck pain [M54.2]   Referring Physician: John Amos MD  PCP: Debbie Morales APRN - CNP     Plan of care signed (Y/N):     Date of Patient follow up with Physician:      Plan of Care Report: NO  POC update due: (10 visits /OR AUTH LIMITS, whichever is less)  3/6/2025                                             Medical History:  Comorbidities:  Hypertension  Relevant Medical History: scoliosis                                         Precautions/ Contra-indications:           Latex allergy:  NO  Pacemaker:    NO  Contraindications for Manipulation: None  Date of Surgery:   Other:    Red Flags:  None    Suicide Screening:   The patient did not verbalize a primary behavioral concern, suicidal ideation, suicidal intent, or demonstrate suicidal behaviors.    Preferred Language for Healthcare:   [x] English       [] other:    SUBJECTIVE EXAMINATION     Patient stated complaint: pt.reports that her back is a little better, feeling stiff due to it being early in the morning, and hasn't been up long to loosen up yet.       Test used Initial score  2025   Pain

## 2025-02-13 ENCOUNTER — APPOINTMENT (OUTPATIENT)
Dept: PHYSICAL THERAPY | Age: 50
End: 2025-02-13
Payer: MEDICAID

## 2025-02-18 ENCOUNTER — APPOINTMENT (OUTPATIENT)
Dept: PHYSICAL THERAPY | Age: 50
End: 2025-02-18
Payer: MEDICAID

## 2025-02-20 ENCOUNTER — HOSPITAL ENCOUNTER (OUTPATIENT)
Dept: PHYSICAL THERAPY | Age: 50
Setting detail: THERAPIES SERIES
Discharge: HOME | End: 2025-02-20
Payer: MEDICAID

## 2025-02-20 PROCEDURE — 97140 MANUAL THERAPY 1/> REGIONS: CPT | Performed by: PHYSICAL THERAPIST

## 2025-02-20 PROCEDURE — 97112 NEUROMUSCULAR REEDUCATION: CPT | Performed by: PHYSICAL THERAPIST

## 2025-02-20 NOTE — FLOWSHEET NOTE
[facility]- Outpatient Rehabilitation and Therapy  [address], [place] [address] office: [phone] fax: [phone]    Physical Therapy: TREATMENT/PROGRESS NOTE   Patient: Elder Silva (49 y.o. female)   Examination Date: 2025   :  1975 MRN: [id number]   Visit #: 21  (10 from 7/10-24)  Insurance Allowable Auth Needed   12v, 6 v,   10/15/24 [place] 30v [x]Yes    []No  Auth after 30v    Insurance: Payor: [employer] [place]ICA[employer] / Plan: [employer] [place] MEDICA / Product Type: *No Product type* / Insurance:KATHRYNDANGELOMARQUISE BABITAKENNEDI  Ded: 7250/ 570.08  OOP: 7250/ 570.08  Codes Billable: YES/ NOT 68038  Copay: 0  Coins: 0  TeleHealth: YES  Visit Limit: BMN  Auth Req: YES/ ANTONIETA   Ref #: AL G / [id number]  Insurance ID: [id number] - (Medicaid Managed)  Secondary Insurance (if applicable):    Treatment Diagnosis:     ICD-10-CM    1. Cervical muscle pain  M54.2       2. Neck stiffness  M43.6       3. Cervical radiculitis  M54.12          Medical Diagnosis:  Cervical spondylosis with radiculopathy [M47.22]  DDD (degenerative disc disease), lumbar [M51.369]  Scoliosis of thoracic spine, unspecified scoliosis type [M41.9]  Cervical spondylolysis [M43.02]  Neck pain [M54.2]   Referring Physician: John Amos MD  PCP: Debbie Morales, APRN - CNP     Plan of care signed (Y/N):     Date of Patient follow up with Physician:      Progress Report/POC: NO  POC update due: (10 visits /OR AUTH LIMITS, whichever is less)  2024                                             Precautions/ Contra-indications:           Latex allergy:  NO  Pacemaker:    NO  Contraindications for Manipulation:  none  Date of Surgery: n/a  Other: has moderate S shaped scoliosis (T spine convex curve to the right)    Red Flags:  None    C-SSRS Triggered by Intake questionnaire:   Patient answered 'NO' to both behavioral questions on intake.  No further screening warranted    Preferred Language

## 2025-02-25 ENCOUNTER — HOSPITAL ENCOUNTER (OUTPATIENT)
Dept: PHYSICAL THERAPY | Age: 50
Setting detail: THERAPIES SERIES
Discharge: HOME | End: 2025-02-25
Payer: MEDICAID

## 2025-02-25 PROCEDURE — 97112 NEUROMUSCULAR REEDUCATION: CPT | Performed by: PHYSICAL THERAPIST

## 2025-02-25 PROCEDURE — 97110 THERAPEUTIC EXERCISES: CPT | Performed by: PHYSICAL THERAPIST

## 2025-02-25 PROCEDURE — 97140 MANUAL THERAPY 1/> REGIONS: CPT | Performed by: PHYSICAL THERAPIST

## 2025-02-25 NOTE — FLOWSHEET NOTE
[facility] - Outpatient Rehabilitation and Therapy: [address], [place] [address] office: [phone] fax: [phone]         Physical Therapy: TREATMENT/PROGRESS NOTE   Patient: Elder Silva (49 y.o. female)   Examination Date: 2025   :  1975 MRN: [id number]   Visit #: 3   Insurance Allowable Auth Needed   30 []Yes    [x]No    Insurance: Payor: [employer] [place] / Plan: [employer] [place] MED[place] / Product Type: *No Product type* /   Insurance ID: [id number] - (Medicaid Managed)  Secondary Insurance (if applicable):    Treatment Diagnosis: Lumbar pain M54.5      Medical Diagnosis:  Cervical spondylosis with radiculopathy [M47.22]  DDD (degenerative disc disease), lumbar [M51.369]  Scoliosis of thoracic spine, unspecified scoliosis type [M41.9]  Cervical spondylolysis [M43.02]  Neck pain [M54.2]   Referring Physician: John Amos MD  PCP: Debbie Morales APRN - CNP     Plan of care signed (Y/N):     Date of Patient follow up with Physician:      Plan of Care Report: NO  POC update due: (10 visits /OR AUTH LIMITS, whichever is less)  3/6/2025                                             Medical History:  Comorbidities:  Hypertension  Relevant Medical History: scoliosis                                         Precautions/ Contra-indications:           Latex allergy:  NO  Pacemaker:    NO  Contraindications for Manipulation: None  Date of Surgery:   Other:    Red Flags:  None    Suicide Screening:   The patient did not verbalize a primary behavioral concern, suicidal ideation, suicidal intent, or demonstrate suicidal behaviors.    Preferred Language for Healthcare:   [x] English       [] other:    SUBJECTIVE EXAMINATION     Patient stated complaint: pt. Reports that she is better with lumbar flexibility, it still takes time in the morning to get moving but only 15 min compared to 45.     Test used Initial score  2025   Pain Summary

## 2025-02-27 ENCOUNTER — HOSPITAL ENCOUNTER (OUTPATIENT)
Dept: PHYSICAL THERAPY | Age: 50
Setting detail: THERAPIES SERIES
Discharge: HOME | End: 2025-02-27
Payer: MEDICAID

## 2025-02-27 PROCEDURE — 97110 THERAPEUTIC EXERCISES: CPT | Performed by: PHYSICAL THERAPIST

## 2025-02-27 PROCEDURE — 97140 MANUAL THERAPY 1/> REGIONS: CPT | Performed by: PHYSICAL THERAPIST

## 2025-02-27 NOTE — FLOWSHEET NOTE
[facility]- Outpatient Rehabilitation and Therapy  [address], [place] [address] office: [phone] fax: [phone]    Physical Therapy: TREATMENT/PROGRESS NOTE   Patient: Elder Sivla (49 y.o. female)   Examination Date: 2025   :  1975 MRN: [id number]   Visit #: 22  (10 from 7/10-24)  Insurance Allowable Auth Needed   12v, 6 v,   10/15/24 [place] 30v [x]Yes    []No  Auth after 30v    Insurance: Payor: [employer] [place]ICA[place] / Plan: [employer] [place] MEDICA / Product Type: *No Product type* / Insurance:KATHRYNDANGELOMARQUISE BABITAKENNEDI  Ded: 7250/ 570.08  OOP: 7250/ 570.08  Codes Billable: YES/ NOT [id number]  Copay: 0  Coins: 0  TeleHealth: YES  Visit Limit: BMN  Auth Req: YES/ ANTONIETA   Ref #: AL G / [id number]  Insurance ID: [id number] - (Medicaid Managed)  Secondary Insurance (if applicable):    Treatment Diagnosis:     ICD-10-CM    1. Cervical muscle pain  M54.2       2. Neck stiffness  M43.6       3. Cervical radiculitis  M54.12          Medical Diagnosis:  Cervical spondylosis with radiculopathy [M47.22]  DDD (degenerative disc disease), lumbar [M51.369]  Scoliosis of thoracic spine, unspecified scoliosis type [M41.9]  Cervical spondylolysis [M43.02]  Neck pain [M54.2]   Referring Physician: John Amos MD  PCP: Debbie Morales, APRN - CNP     Plan of care signed (Y/N):     Date of Patient follow up with Physician:      Progress Report/POC: NO  POC update due: (10 visits /OR AUTH LIMITS, whichever is less)  2024                                             Precautions/ Contra-indications:           Latex allergy:  NO  Pacemaker:    NO  Contraindications for Manipulation:  none  Date of Surgery: n/a  Other: has moderate S shaped scoliosis (T spine convex curve to the right)    Red Flags:  None    C-SSRS Triggered by Intake questionnaire:   Patient answered 'NO' to both behavioral questions on intake.  No further screening warranted    Preferred Language

## 2025-03-04 ENCOUNTER — HOSPITAL ENCOUNTER (OUTPATIENT)
Dept: PHYSICAL THERAPY | Age: 50
Setting detail: THERAPIES SERIES
Discharge: HOME | End: 2025-03-04
Payer: MEDICAID

## 2025-03-04 PROCEDURE — 97140 MANUAL THERAPY 1/> REGIONS: CPT | Performed by: PHYSICAL THERAPIST

## 2025-03-04 PROCEDURE — 97110 THERAPEUTIC EXERCISES: CPT | Performed by: PHYSICAL THERAPIST

## 2025-03-04 NOTE — FLOWSHEET NOTE
Frequency/Duration: 1-2x/week for 8 weeks for the following treatment interventions:    Interventions:  Therapeutic Exercise (88891) including: strength training, ROM, and functional mobility  Manual Therapy (07352) as indicated to include: Passive Range of Motion, Gr I-IV mobilizations, Soft Tissue Mobilization, Dry Needling/IASTM, and Trigger Point Release  Modalities as needed that may include: Thermal Agents  Patient education on postural re-education, activity modification, and progression of HEP    Plan: Cont POC- Continue emphasis/focus on exercise progression, improving proper muscle recruitment and activation/motor control patterns, promoting relaxation, improving soft tissue extensibility, allowing for proper ROM, and improving postural awareness. Next visit plan to continue with manuals for Soft tissue mobility and rib dysfunction.     Electronically Signed by Ashley Turner PT, MSPT, OMT-C [id number]    Date: 03/04/2025     Note: Portions of this note have been templated and/or copied from initial evaluation, reassessments and prior notes for documentation efficiency.    Note: If patient does not return for scheduled/recommended follow up visits, this note will serve as a discharge from care along with the most recent update on progress.    Ortho Evaluation

## 2025-03-11 ENCOUNTER — HOSPITAL ENCOUNTER (OUTPATIENT)
Dept: PHYSICAL THERAPY | Age: 50
Setting detail: THERAPIES SERIES
Discharge: HOME | End: 2025-03-11
Payer: MEDICAID

## 2025-03-11 PROCEDURE — 97140 MANUAL THERAPY 1/> REGIONS: CPT | Performed by: PHYSICAL THERAPIST

## 2025-03-11 NOTE — FLOWSHEET NOTE
[facility]- Outpatient Rehabilitation and Therapy  [address], [place] [address] office: [phone] fax: [phone]  Physical Therapy Re-Certification Plan of Care    Dear John Amos MD  ,    We had the pleasure of treating the following patient for physical therapy services at [facility]. A summary of our findings can be found in the updated assessment below.  This includes our plan of care.  If you have any questions or concerns regarding these findings, please do not hesitate to contact me at the office phone number checked above.  Thank you for the referral.     Physician Signature:________________________________Date:__________________  By signing above (or electronic signature), therapist's plan is approved by physician      Total Visits: 24     Overall Response to Treatment:  Patient is responding well to treatment and improvement is noted with regards to goals    Recommendation:    [x] Continue PT 1-2x / wk for 4 weeks.   [] Hold PT, pending MD visit   [] Discharge to [facility]. Follow up with PT or MD PRN.    Physical Therapy: TREATMENT/PROGRESS NOTE   Patient: Elder Silva (49 y.o. female)   Examination Date: 2025   :  1975 MRN: [id number]   Visit #: 24  (10 from 7/10-24)  Insurance Allowable Auth Needed   12v, 6 v,   10/15/24 [place] 30v [x]Yes    []No  Auth after 30v    Insurance: Payor: [employer] [place]ICAID / Plan: [employer] [place] MEDICA / Product Type: *No Product type* / Insurance:ALYX HERNANDEZ  Ded: 7250/ 570.08  OOP: 7250/ 570.08  Codes Billable: YES/ NOT [id number]  Copay: 0  Coins: 0  TeleHealth: YES  Visit Limit: BMN  Auth Req: YES/ ANTONIETA   Ref #: AL G / [id number]  Insurance ID: [id number] - (Medicaid Managed)  Secondary Insurance (if applicable):    Treatment Diagnosis:     ICD-10-CM    1. Cervical muscle pain  M54.2       2. Neck stiffness  M43.6       3. Cervical radiculitis  M54.12

## 2025-03-18 ENCOUNTER — HOSPITAL ENCOUNTER (OUTPATIENT)
Dept: PHYSICAL THERAPY | Age: 50
Setting detail: THERAPIES SERIES
Discharge: HOME | End: 2025-03-18
Payer: MEDICAID

## 2025-03-18 PROCEDURE — 97140 MANUAL THERAPY 1/> REGIONS: CPT | Performed by: PHYSICAL THERAPIST

## 2025-03-18 PROCEDURE — 97110 THERAPEUTIC EXERCISES: CPT | Performed by: PHYSICAL THERAPIST

## 2025-03-18 NOTE — FLOWSHEET NOTE
Progression has been slowed due to co-morbidities.  [] Plan just implemented, too soon (<30days) to assess goals progression   [] Goals require adjustment due to lack of progress  [] Patient is not progressing as expected and requires additional follow up with physician  [] Other:     TREATMENT PLAN     Frequency/Duration: 1-2x/week for 8 weeks for the following treatment interventions:    Interventions:  Therapeutic Exercise (59042) including: strength training, ROM, and functional mobility  Manual Therapy (77446) as indicated to include: Passive Range of Motion, Gr I-IV mobilizations, Soft Tissue Mobilization, Dry Needling/IASTM, and Trigger Point Release  Modalities as needed that may include: Thermal Agents  Patient education on postural re-education, activity modification, and progression of HEP    Plan: Cont POC- Continue emphasis/focus on exercise progression, improving proper muscle recruitment and activation/motor control patterns, promoting relaxation, improving soft tissue extensibility, allowing for proper ROM, and improving postural awareness. Next visit plan to add new exercises for UE strength and endurance    Electronically Signed by Ashley Turner PT, MSPT, OMT-C 9214    Date: 03/18/2025     Note: Portions of this note have been templated and/or copied from initial evaluation, reassessments and prior notes for documentation efficiency.    Note: If patient does not return for scheduled/recommended follow up visits, this note will serve as a discharge from care along with the most recent update on progress.    Ortho Evaluation

## 2025-03-25 ENCOUNTER — HOSPITAL ENCOUNTER (OUTPATIENT)
Dept: PHYSICAL THERAPY | Age: 50
Setting detail: THERAPIES SERIES
Discharge: HOME | End: 2025-03-25
Payer: MEDICAID

## 2025-03-25 PROCEDURE — 97140 MANUAL THERAPY 1/> REGIONS: CPT | Performed by: PHYSICAL THERAPIST

## 2025-03-25 NOTE — FLOWSHEET NOTE
[facility]- Outpatient Rehabilitation and Therapy  [address], [place] [address] office: [phone] fax: [phone]    Physical Therapy: TREATMENT/PROGRESS NOTE   Patient: Elder Silva (49 y.o. female)   Examination Date: 2025   :  1975 MRN: [id number]   Visit #: 26  (10 from 7/10-24)  Insurance Allowable Auth Needed   12v, 6 v,   10/15/24 [place] 30v [x]Yes    []No  Auth after 30v    Insurance: Payor: [employer] [place]ICA[place] / Plan: [employer] [place] / Product Type: *No Product type* / Insurance:KATHRYNDANGELOMARQUISE BABITAKENNEDI  Ded: 7250/ 570.08  OOP: 7250/ 570.08  Codes Billable: YES/ NOT 51089  Copay: 0  Coins: 0  TeleHealth: YES  Visit Limit: BMN  Auth Req: YES/ ANTONIETA   Ref #: AL G / [id number]  Insurance ID: [id number] - (Medicaid Managed)  Secondary Insurance (if applicable):    Treatment Diagnosis:     ICD-10-CM    1. Cervical muscle pain  M54.2       2. Neck stiffness  M43.6       3. Cervical radiculitis  M54.12          Medical Diagnosis:  Cervical spondylosis with radiculopathy [M47.22]  DDD (degenerative disc disease), lumbar [M51.369]  Scoliosis of thoracic spine, unspecified scoliosis type [M41.9]  Cervical spondylolysis [M43.02]  Neck pain [M54.2]   Referring Physician: John Amos MD  PCP: Debbie Morales, APRN - CNP     Plan of care signed (Y/N):     Date of Patient follow up with Physician:      Progress Report/POC: NO  POC update due: (10 visits /OR AUTH LIMITS, whichever is less)  2024                                             Precautions/ Contra-indications:           Latex allergy:  NO  Pacemaker:    NO  Contraindications for Manipulation:  none  Date of Surgery: n/a  Other: has moderate S shaped scoliosis (T spine convex curve to the right)    Red Flags:  None    C-SSRS Triggered by Intake questionnaire:   Patient answered 'NO' to both behavioral questions on intake.  No further screening warranted    Preferred Language

## 2025-04-01 ENCOUNTER — HOSPITAL ENCOUNTER (OUTPATIENT)
Dept: PHYSICAL THERAPY | Age: 50
Setting detail: THERAPIES SERIES
Discharge: HOME | End: 2025-04-01
Payer: MEDICAID

## 2025-04-01 PROCEDURE — 97140 MANUAL THERAPY 1/> REGIONS: CPT | Performed by: PHYSICAL THERAPIST

## 2025-04-01 NOTE — FLOWSHEET NOTE
[facility]- Outpatient Rehabilitation and Therapy  [address], [place] [address] office: [phone] fax: [phone]    Physical Therapy: TREATMENT/PROGRESS NOTE   Patient: Elder Silva (49 y.o. female)   Examination Date: 2025   :  1975 MRN: [id number]   Visit #: 27  (10 from 7/10-24)  Insurance Allowable Auth Needed   12v, 6 v,   10/15/24 [place] 30v [x]Yes    []No  Auth after 30v    Insurance: Payor: [employer] [place] / Plan: [employer] [place] / Product Type: *No Product type* / Insurance:KATHRYNDANGELOMARQUISE BABITAKENNEDI  Ded: 7250/ 570.08  OOP: 7250/ 570.08  Codes Billable: YES/ NOT [id number]  Copay: 0  Coins: 0  TeleHealth: YES  Visit Limit: BMN  Auth Req: YES/ ANTONIETA   Ref #: AL G / [id number]  Insurance ID: [id number] - (Medicaid Managed)  Secondary Insurance (if applicable):    Treatment Diagnosis:     ICD-10-CM    1. Cervical muscle pain  M54.2       2. Neck stiffness  M43.6       3. Cervical radiculitis  M54.12          Medical Diagnosis:  Cervical spondylosis with radiculopathy [M47.22]  DDD (degenerative disc disease), lumbar [M51.369]  Scoliosis of thoracic spine, unspecified scoliosis type [M41.9]  Cervical spondylolysis [M43.02]  Neck pain [M54.2]   Referring Physician: John Amos MD  PCP: Debbie Morales, APRN - CNP     Plan of care signed (Y/N):     Date of Patient follow up with Physician:      Progress Report/POC: NO  POC update due: (10 visits /OR AUTH LIMITS, whichever is less)  2024                                             Precautions/ Contra-indications:           Latex allergy:  NO  Pacemaker:    NO  Contraindications for Manipulation:  none  Date of Surgery: n/a  Other: has moderate S shaped scoliosis (T spine convex curve to the right)    Red Flags:  None    C-SSRS Triggered by Intake questionnaire:   Patient answered 'NO' to both behavioral questions on intake.  No further screening warranted    Preferred Language

## 2025-04-10 ENCOUNTER — APPOINTMENT (OUTPATIENT)
Dept: PHYSICAL THERAPY | Age: 50
End: 2025-04-10
Payer: MEDICAID

## 2025-04-15 ENCOUNTER — HOSPITAL ENCOUNTER (OUTPATIENT)
Dept: PHYSICAL THERAPY | Age: 50
Setting detail: THERAPIES SERIES
Discharge: HOME | End: 2025-04-15
Payer: MEDICAID

## 2025-04-15 PROCEDURE — 97110 THERAPEUTIC EXERCISES: CPT | Performed by: PHYSICAL THERAPIST

## 2025-04-15 PROCEDURE — 97140 MANUAL THERAPY 1/> REGIONS: CPT | Performed by: PHYSICAL THERAPIST

## 2025-04-15 NOTE — FLOWSHEET NOTE
Term Goals: To be achieved in: 2 weeks  Independent in HEP and progression per patient tolerance, in order to prevent re-injury.   [x] Progressing: [] Met: [] Not Met: [] Adjusted  Patient will have a decrease in pain to <2/10 to facilitate improvement in movement, function, and ADLs as indicated by Functional Deficits.  [] Progressing: [] Met: [x] Not Met:recent flare up [] Adjusted    IF APPLICABLE:  [] Patient to demonstrate independence in wear and care for custom orthotic device. (Only if applicable for orthotic eval)     Long Term Goals: To be achieved in: 8 weeks  Disability index score of 18% or less for the Modified Oswestry to assist with reaching prior level of function with activities such as position changes.  [] Progressing: [] Met: [] Not Met: [] Adjusted  Patient will demonstrate increased AROM of R hip and lumbar spine to WNL without pain to allow for proper joint functioning to enable patient to bend forward and get dressed in the morning.   [] Progressing: [] Met: [x] Not Met: [] Adjusted  Patient will demonstrate increased Strength of B hips to at least 4+/5 throughout without pain to allow for proper functional mobility to enable patient to return to go up and down steps without issue.   [x] Progressing: [] Met: [] Not Met: [] Adjusted  Patient will return to sitting for 2 hour time frame for work without increased symptoms or restriction.   [x] Progressing: [] Met: [] Not Met: [] Adjusted  Pt. To be able to sleep 6 hours or better without waking due to pain in LB(patient specific functional goal)    [x] Progressing: [] Met: [] Not Met: [] Adjusted     Overall Progression Towards Functional goals/ Treatment Progress Update:  [] Patient is progressing as expected towards functional goals listed.    [] Progression is slowed due to complexities/Impairments listed.  [] Progression has been slowed due to co-morbidities.  [x] Plan just implemented, too soon (<30days) to assess goals progression   []

## 2025-04-22 ENCOUNTER — HOSPITAL ENCOUNTER (OUTPATIENT)
Dept: PHYSICAL THERAPY | Age: 50
Setting detail: THERAPIES SERIES
Discharge: HOME | End: 2025-04-22
Payer: MEDICAID

## 2025-04-22 PROCEDURE — 97140 MANUAL THERAPY 1/> REGIONS: CPT | Performed by: PHYSICAL THERAPIST

## 2025-04-22 PROCEDURE — 97110 THERAPEUTIC EXERCISES: CPT | Performed by: PHYSICAL THERAPIST

## 2025-04-22 NOTE — FLOWSHEET NOTE
[facility] - Outpatient Rehabilitation and Therapy: [address], [place] [address] office: [phone] fax: [phone]         Physical Therapy: TREATMENT/PROGRESS NOTE   Patient: Elder Silva (49 y.o. female)   Examination Date: 2025   :  1975 MRN: [id number]   Visit #: 5   Insurance Allowable Auth Needed   30 []Yes    [x]No    Insurance: Payor: [employer] [place] / Plan: [employer] [place] / Product Type: *No Product type* /   Insurance ID: [id number] - (Medicaid Managed)  Secondary Insurance (if applicable):    Treatment Diagnosis: Lumbar pain M54.5      Medical Diagnosis:  Cervical spondylosis with radiculopathy [M47.22]  DDD (degenerative disc disease), lumbar [M51.369]  Scoliosis of thoracic spine, unspecified scoliosis type [M41.9]  Cervical spondylolysis [M43.02]  Neck pain [M54.2]   Referring Physician: John Amos MD  PCP: Debbie Morales APRN - CNP     Plan of care signed (Y/N):     Date of Patient follow up with Physician:      Plan of Care Report: NO  POC update due: (10 visits /OR AUTH LIMITS, whichever is less)  3/6/2025                                             Medical History:  Comorbidities:  Hypertension  Relevant Medical History: scoliosis                                         Precautions/ Contra-indications:           Latex allergy:  NO  Pacemaker:    NO  Contraindications for Manipulation: None  Date of Surgery:   Other:    Red Flags:  None    Suicide Screening:   The patient did not verbalize a primary behavioral concern, suicidal ideation, suicidal intent, or demonstrate suicidal behaviors.    Preferred Language for Healthcare:   [x] English       [] other:    SUBJECTIVE EXAMINATION     Patient stated complaint: pt. Reports that her LB is still bothering her the most, still taking 2 hours to get moving in the morning.  She reports that the PT session last week helped but was more sore the same day.     Test

## 2025-04-29 ENCOUNTER — HOSPITAL ENCOUNTER (OUTPATIENT)
Dept: PHYSICAL THERAPY | Age: 50
Setting detail: THERAPIES SERIES
Discharge: HOME | End: 2025-04-29
Payer: MEDICAID

## 2025-04-29 PROCEDURE — G0283 ELEC STIM OTHER THAN WOUND: HCPCS | Performed by: PHYSICAL THERAPIST

## 2025-04-29 PROCEDURE — 97112 NEUROMUSCULAR REEDUCATION: CPT | Performed by: PHYSICAL THERAPIST

## 2025-04-29 PROCEDURE — 97110 THERAPEUTIC EXERCISES: CPT | Performed by: PHYSICAL THERAPIST

## 2025-04-29 PROCEDURE — 97140 MANUAL THERAPY 1/> REGIONS: CPT | Performed by: PHYSICAL THERAPIST

## 2025-04-29 NOTE — PLAN OF CARE
[facility] - Outpatient Rehabilitation and Therapy: [address], [place] [address] office: [phone] fax: [phone]         Physical Therapy: TREATMENT/PROGRESS NOTE   Patient: Elder Silva (49 y.o. female)   Examination Date: 2025   :  1975 MRN: [id number]   Visit #: 6   Insurance Allowable Auth Needed   30 []Yes    [x]No    Insurance: Payor: [employer] [place] / Plan: [employer] [place] / Product Type: *No Product type* /   Insurance ID: [id number] - (Medicaid Managed)  Secondary Insurance (if applicable):    Treatment Diagnosis: Lumbar pain M54.5      Medical Diagnosis:  Cervical spondylosis with radiculopathy [M47.22]  DDD (degenerative disc disease), lumbar [M51.369]  Scoliosis of thoracic spine, unspecified scoliosis type [M41.9]  Cervical spondylolysis [M43.02]  Neck pain [M54.2]   Referring Physician: John Amos MD  PCP: Debbie Morales APRN - CNP     Plan of care signed (Y/N):     Date of Patient follow up with Physician:      Plan of Care Report: YES, Date Range for this report: 25 to 25  POC update due: (10 visits /OR AUTH LIMITS, whichever is less)  2025                                             Medical History:  Comorbidities:  Hypertension  Relevant Medical History: scoliosis                                         Precautions/ Contra-indications:           Latex allergy:  NO  Pacemaker:    NO  Contraindications for Manipulation: None  Date of Surgery:   Other:    Red Flags:  None    Suicide Screening:   The patient did not verbalize a primary behavioral concern, suicidal ideation, suicidal intent, or demonstrate suicidal behaviors.    Preferred Language for Healthcare:   [x] English       [] other:    SUBJECTIVE EXAMINATION     Patient stated complaint: pt. Reports that the relief from PT is not lasting and the LBP is still consistent and limiting her function especially with rising in the morning.

## 2025-05-06 ENCOUNTER — OFFICE VISIT (OUTPATIENT)
Dept: ORTHOPEDIC SURGERY | Age: 50
End: 2025-05-06
Payer: MEDICAID

## 2025-05-06 ENCOUNTER — HOSPITAL ENCOUNTER (OUTPATIENT)
Dept: PHYSICAL THERAPY | Age: 50
Setting detail: THERAPIES SERIES
Discharge: HOME | End: 2025-05-06
Payer: MEDICAID

## 2025-05-06 VITALS — HEIGHT: 66 IN | WEIGHT: 140 LBS | BODY MASS INDEX: 22.5 KG/M2

## 2025-05-06 DIAGNOSIS — M47.816 FACET ARTHROPATHY, LUMBAR: ICD-10-CM

## 2025-05-06 DIAGNOSIS — M54.50 LUMBAR PAIN: Primary | ICD-10-CM

## 2025-05-06 DIAGNOSIS — M47.816 LUMBAR SPONDYLOSIS: ICD-10-CM

## 2025-05-06 DIAGNOSIS — M41.9 SCOLIOSIS, UNSPECIFIED SCOLIOSIS TYPE, UNSPECIFIED SPINAL REGION: ICD-10-CM

## 2025-05-06 PROCEDURE — 99214 OFFICE O/P EST MOD 30 MIN: CPT | Performed by: PHYSICIAN ASSISTANT

## 2025-05-06 PROCEDURE — G8427 DOCREV CUR MEDS BY ELIG CLIN: HCPCS | Performed by: PHYSICIAN ASSISTANT

## 2025-05-06 PROCEDURE — G0283 ELEC STIM OTHER THAN WOUND: HCPCS | Performed by: PHYSICAL THERAPIST

## 2025-05-06 PROCEDURE — 97140 MANUAL THERAPY 1/> REGIONS: CPT | Performed by: PHYSICAL THERAPIST

## 2025-05-06 PROCEDURE — 1036F TOBACCO NON-USER: CPT | Performed by: PHYSICIAN ASSISTANT

## 2025-05-06 PROCEDURE — 97112 NEUROMUSCULAR REEDUCATION: CPT | Performed by: PHYSICAL THERAPIST

## 2025-05-06 PROCEDURE — G8420 CALC BMI NORM PARAMETERS: HCPCS | Performed by: PHYSICIAN ASSISTANT

## 2025-05-06 RX ORDER — CYCLOBENZAPRINE HCL 10 MG
10 TABLET ORAL 3 TIMES DAILY PRN
Qty: 30 TABLET | Refills: 0 | Status: SHIPPED | OUTPATIENT
Start: 2025-05-06 | End: 2025-05-16

## 2025-05-06 RX ORDER — PREDNISONE 20 MG/1
TABLET ORAL
Qty: 20 TABLET | Refills: 0 | Status: SHIPPED | OUTPATIENT
Start: 2025-05-06

## 2025-05-06 NOTE — PROGRESS NOTES
Facet arthropathy, lumbar    3. Lumbar spondylosis    4. Scoliosis, unspecified scoliosis type, unspecified spinal region        Plan:      We discussed the diagnosis and treatment options including observation, oral steroids, physical therapy, epidural injections and additional imaging. She wishes to proceed with a prescription for a prednisone taper that she is to take as prescribed and she is not to take any over-the-counter ibuprofen or Aleve along with the prednisone.  She was also given a prescription for Flexeril 10 mg to be taken 1 p.o. 3 times daily as needed for muscle spasm.  We ordered a MRI of the lumbar spine to further evaluate her back pain.  She is to continue with outpatient physical therapy and we will include dry needling as needed.    Follow up -after the MRI to review the results and to discuss treatment options    Old records were reviewed.    Total evaluation time to include patient education and coordination of care:32 minutes    Frank Rabago PA-C  Senior Physician Assistant  Board certified by the [facility]  [facility]

## 2025-05-06 NOTE — PLAN OF CARE
[facility] - Outpatient Rehabilitation and Therapy: [address], [place] [address] office: [phone] fax: [phone]         Physical Therapy: TREATMENT/PROGRESS NOTE   Patient: Elder Silva (49 y.o. female)   Examination Date: 2025   :  1975 MRN: [id number]   Visit #: 7   Insurance Allowable Auth Needed   30 []Yes    [x]No    Insurance: Payor: [employer] [place] / Plan: [employer] [place] / Product Type: *No Product type* /   Insurance ID: [id number] - (Medicaid Managed)  Secondary Insurance (if applicable):    Treatment Diagnosis: Lumbar pain M54.5      Medical Diagnosis:  Cervical spondylosis with radiculopathy [M47.22]  DDD (degenerative disc disease), lumbar [M51.369]  Scoliosis of thoracic spine, unspecified scoliosis type [M41.9]  Cervical spondylolysis [M43.02]  Neck pain [M54.2]   Referring Physician: John Amos MD  PCP: Debbie Morales APRN - CNP     Plan of care signed (Y/N):     Date of Patient follow up with Physician:      Plan of Care Report: NO  POC update due: (10 visits /OR AUTH LIMITS, whichever is less)  2025                                             Medical History:  Comorbidities:  Hypertension  Relevant Medical History: scoliosis                                         Precautions/ Contra-indications:           Latex allergy:  NO  Pacemaker:    NO  Contraindications for Manipulation: None  Date of Surgery:   Other:    Red Flags:  None    Suicide Screening:   The patient did not verbalize a primary behavioral concern, suicidal ideation, suicidal intent, or demonstrate suicidal behaviors.    Preferred Language for Healthcare:   [x] English       [] other:    SUBJECTIVE EXAMINATION     Patient stated complaint: pt. Reports that she had more relief following last session, she is scheduled to see the PA for spine this morning.  She is going to inquire about TDN.     Test used Initial score  2025

## 2025-05-06 NOTE — FLOWSHEET NOTE
[facility] - Outpatient Rehabilitation and Therapy: [address], [place] [address] office: [phone] fax: [phone]         Physical Therapy: TREATMENT/PROGRESS NOTE   Patient: Elder Silva (49 y.o. female)   Examination Date: 2025   :  1975 MRN: [id number]   Visit #: 7   Insurance Allowable Auth Needed   30 []Yes    [x]No    Insurance: Payor: [employer] [place] / Plan: [employer] [place] / Product Type: *No Product type* /   Insurance ID: [id number] - (Medicaid Managed)  Secondary Insurance (if applicable):    Treatment Diagnosis: Lumbar pain M54.5      Medical Diagnosis:  Cervical spondylosis with radiculopathy [M47.22]  DDD (degenerative disc disease), lumbar [M51.369]  Scoliosis of thoracic spine, unspecified scoliosis type [M41.9]  Cervical spondylolysis [M43.02]  Neck pain [M54.2]   Referring Physician: John Amos MD  PCP: Debbie Morales APRN - CNP     Plan of care signed (Y/N):     Date of Patient follow up with Physician:      Plan of Care Report: NO  POC update due: (10 visits /OR AUTH LIMITS, whichever is less)  2025                                             Medical History:  Comorbidities:  Hypertension  Relevant Medical History: scoliosis                                         Precautions/ Contra-indications:           Latex allergy:  NO  Pacemaker:    NO  Contraindications for Manipulation: None  Date of Surgery:   Other:    Red Flags:  None    Suicide Screening:   The patient did not verbalize a primary behavioral concern, suicidal ideation, suicidal intent, or demonstrate suicidal behaviors.    Preferred Language for Healthcare:   [x] English       [] other:    SUBJECTIVE EXAMINATION     Patient stated complaint: pt. Reports that she had more relief following last session, she is scheduled to see the PA for spine this morning.  She is going to inquire about TDN.     Test used Initial score  2025

## 2025-05-09 ENCOUNTER — APPOINTMENT (OUTPATIENT)
Dept: PHYSICAL THERAPY | Age: 50
End: 2025-05-09
Payer: MEDICAID

## 2025-05-09 ENCOUNTER — HOSPITAL ENCOUNTER (OUTPATIENT)
Dept: MRI IMAGING | Age: 50
Discharge: HOME | End: 2025-05-09
Payer: MEDICAID

## 2025-05-09 DIAGNOSIS — M47.816 FACET ARTHROPATHY, LUMBAR: ICD-10-CM

## 2025-05-09 DIAGNOSIS — M54.50 LUMBAR PAIN: ICD-10-CM

## 2025-05-09 DIAGNOSIS — M47.816 LUMBAR SPONDYLOSIS: ICD-10-CM

## 2025-05-09 DIAGNOSIS — M41.9 SCOLIOSIS, UNSPECIFIED SCOLIOSIS TYPE, UNSPECIFIED SPINAL REGION: ICD-10-CM

## 2025-05-09 PROCEDURE — 72148 MRI LUMBAR SPINE W/O DYE: CPT

## 2025-05-12 ENCOUNTER — HOSPITAL ENCOUNTER (OUTPATIENT)
Dept: PHYSICAL THERAPY | Age: 50
Setting detail: THERAPIES SERIES
Discharge: HOME | End: 2025-05-12
Payer: MEDICAID

## 2025-05-12 PROCEDURE — 97140 MANUAL THERAPY 1/> REGIONS: CPT | Performed by: PHYSICAL THERAPIST

## 2025-05-12 PROCEDURE — 97110 THERAPEUTIC EXERCISES: CPT | Performed by: PHYSICAL THERAPIST

## 2025-05-12 PROCEDURE — 97112 NEUROMUSCULAR REEDUCATION: CPT | Performed by: PHYSICAL THERAPIST

## 2025-05-12 PROCEDURE — G0283 ELEC STIM OTHER THAN WOUND: HCPCS | Performed by: PHYSICAL THERAPIST

## 2025-05-12 NOTE — FLOWSHEET NOTE
[facility] - Outpatient Rehabilitation and Therapy: [address], [place] [address] office: [phone] fax: [phone]         Physical Therapy: TREATMENT/PROGRESS NOTE   Patient: Elder Silva (49 y.o. female)   Examination Date: 2025   :  1975 MRN: [id number]   Visit #: 8   Insurance Allowable Auth Needed   30 []Yes    [x]No    Insurance: Payor: [employer] [place] / Plan: [employer] [place] / Product Type: *No Product type* /   Insurance ID: [id number] - (Medicaid Managed)  Secondary Insurance (if applicable):    Treatment Diagnosis: Lumbar pain M54.5      Medical Diagnosis:  Cervical spondylosis with radiculopathy [M47.22]  DDD (degenerative disc disease), lumbar [M51.369]  Scoliosis of thoracic spine, unspecified scoliosis type [M41.9]  Cervical spondylolysis [M43.02]  Neck pain [M54.2]   Referring Physician: John Amos MD  PCP: Debbie Morales APRN - CNP     Plan of care signed (Y/N):     Date of Patient follow up with Physician:      Plan of Care Report: NO  POC update due: (10 visits /OR AUTH LIMITS, whichever is less)  2025                                             Medical History:  Comorbidities:  Hypertension  Relevant Medical History: scoliosis                                         Precautions/ Contra-indications:           Latex allergy:  NO  Pacemaker:    NO  Contraindications for Manipulation: None  Date of Surgery:   Other:    Red Flags:  None    Suicide Screening:   The patient did not verbalize a primary behavioral concern, suicidal ideation, suicidal intent, or demonstrate suicidal behaviors.    Preferred Language for Healthcare:   [x] English       [] other:    SUBJECTIVE EXAMINATION     Patient stated complaint: pt. Reports that she saw PA and he started her on a steroid and muscle relaxer.  She had an MRI on Friday as well.  She is feeling much better and can move better in the morning.  She still feels the

## 2025-05-13 ENCOUNTER — APPOINTMENT (OUTPATIENT)
Dept: PHYSICAL THERAPY | Age: 50
End: 2025-05-13
Payer: MEDICAID

## 2025-05-14 ENCOUNTER — OFFICE VISIT (OUTPATIENT)
Dept: ORTHOPEDIC SURGERY | Age: 50
End: 2025-05-14
Payer: MEDICAID

## 2025-05-14 VITALS — HEIGHT: 66 IN | BODY MASS INDEX: 22.5 KG/M2 | WEIGHT: 140 LBS

## 2025-05-14 DIAGNOSIS — M51.360 DEGENERATION OF INTERVERTEBRAL DISC OF LUMBAR REGION WITH DISCOGENIC BACK PAIN: ICD-10-CM

## 2025-05-14 DIAGNOSIS — M47.816 FACET ARTHROPATHY, LUMBAR: Primary | ICD-10-CM

## 2025-05-14 DIAGNOSIS — M47.816 LUMBAR SPONDYLOSIS: ICD-10-CM

## 2025-05-14 PROCEDURE — G8420 CALC BMI NORM PARAMETERS: HCPCS | Performed by: PHYSICIAN ASSISTANT

## 2025-05-14 PROCEDURE — G8427 DOCREV CUR MEDS BY ELIG CLIN: HCPCS | Performed by: PHYSICIAN ASSISTANT

## 2025-05-14 PROCEDURE — 99213 OFFICE O/P EST LOW 20 MIN: CPT | Performed by: PHYSICIAN ASSISTANT

## 2025-05-14 PROCEDURE — 1036F TOBACCO NON-USER: CPT | Performed by: PHYSICIAN ASSISTANT

## 2025-05-14 NOTE — PROGRESS NOTES
lb).    GENERAL EXAM:  General Apparence: Patient is adequately groomed with no evidence of malnutrition.  Orientation: The patient is oriented to time, place and person.   Mood & Affect:The patient's mood and affect are appropriate.  Vascular: Examination reveals no swelling tenderness in upper or lower extremities. Good capillary refill.  Lymphatic: The lymphatic examination bilaterally reveals all areas to be without enlargement or induration  Sensation: Sensation is intact without deficit  Coordination/Balance: Good coordination.     LUMBAR/SACRAL EXAMINATION:  Inspection: Local inspection shows no step-off or bruising.  Lumbar alignment is normal.  Sagittal and Coronal balance is neutral.      Palpation:   Diffuse tenderness at the midline to her right.  No tenderness bilaterally at the paraspinal or trochanters.  There is no step-off or paraspinal spasm.   Range of Motion: Lumbar flexion, extension and rotation are mildly limited due to pain.  Strength:   Strength testing is 5/5 in all muscle groups tested.   Special Tests:   Straight leg raise and crossed SLR negative.  Leg length and pelvis level.   Skin: There are no rashes, ulcerations or lesions.  Reflexes: Reflexes are symmetrically 2+ at the patellar and ankle tendons.  Clonus absent bilaterally at the feet.  Gait & station: normal, patient ambulates without assistance    Additional Examinations:   RIGHT LOWER EXTREMITY: Inspection/examination of the right lower extremity does not show any tenderness, deformity or injury. Range of motion is unremarkable. There is no gross instability.  There are no rashes, ulcerations or lesions. Strength and tone are normal.  LEFT LOWER EXTREMITY:  Inspection/examination of the left lower extremity does not show any tenderness, deformity or injury. Range of motion is unremarkable. There is no gross instability. There are no rashes, ulcerations or lesions.  Strength and tone are normal.    Diagnostic Testing:      MRI:

## 2025-05-18 ENCOUNTER — PATIENT MESSAGE (OUTPATIENT)
Dept: FAMILY MEDICINE CLINIC | Age: 50
End: 2025-05-18

## 2025-05-19 ENCOUNTER — APPOINTMENT (OUTPATIENT)
Dept: PHYSICAL THERAPY | Age: 50
End: 2025-05-19
Payer: MEDICAID

## 2025-05-19 DIAGNOSIS — M47.816 LUMBAR SPONDYLOSIS: Primary | ICD-10-CM

## 2025-05-19 NOTE — TELEPHONE ENCOUNTER
Spoke with patient and informed her of referral already placed. She states they called her and stated the referral needed to come from PCP per insurance.  Calling [facility] office @ [phone] to check. Spoke with Jayne at [facility], referral must come from PCP per [facility]. That is all they need, Imaging is done already.  Will forward to Crystal for approval

## 2025-05-27 ENCOUNTER — APPOINTMENT (OUTPATIENT)
Dept: PHYSICAL THERAPY | Age: 50
End: 2025-05-27
Payer: MEDICAID

## 2025-05-27 DIAGNOSIS — E03.9 HYPOTHYROIDISM, UNSPECIFIED TYPE: ICD-10-CM

## 2025-05-27 RX ORDER — LEVOTHYROXINE SODIUM 88 UG/1
88 TABLET ORAL DAILY
Qty: 90 TABLET | Refills: 3 | Status: SHIPPED | OUTPATIENT
Start: 2025-05-27

## 2025-05-27 NOTE — TELEPHONE ENCOUNTER
Last Office Visit  -  10/3/24  Next Office Visit  -  n/a    Last Filled  -  2/26/25  Last UDS -  n/a  Contract -  n/a

## 2025-06-12 DIAGNOSIS — J30.9 ALLERGIC RHINITIS, UNSPECIFIED SEASONALITY, UNSPECIFIED TRIGGER: ICD-10-CM

## 2025-06-12 RX ORDER — FLUTICASONE PROPIONATE 50 MCG
SPRAY, SUSPENSION (ML) NASAL
Qty: 16 ML | Refills: 4 | Status: SHIPPED | OUTPATIENT
Start: 2025-06-12

## 2025-08-28 RX ORDER — PRAVASTATIN SODIUM 20 MG
TABLET ORAL
Qty: 90 TABLET | Refills: 3 | Status: SHIPPED | OUTPATIENT
Start: 2025-08-28